# Patient Record
Sex: MALE | Race: WHITE | NOT HISPANIC OR LATINO | Employment: OTHER | URBAN - METROPOLITAN AREA
[De-identification: names, ages, dates, MRNs, and addresses within clinical notes are randomized per-mention and may not be internally consistent; named-entity substitution may affect disease eponyms.]

---

## 2017-01-20 ENCOUNTER — ALLIED HEALTH/NURSE VISIT (OUTPATIENT)
Dept: NURSING | Facility: CLINIC | Age: 47
End: 2017-01-20
Payer: COMMERCIAL

## 2017-01-20 DIAGNOSIS — Z23 NEED FOR PROPHYLACTIC VACCINATION AND INOCULATION AGAINST INFLUENZA: Primary | ICD-10-CM

## 2017-01-20 PROCEDURE — 90686 IIV4 VACC NO PRSV 0.5 ML IM: CPT

## 2017-01-20 PROCEDURE — 90471 IMMUNIZATION ADMIN: CPT

## 2017-01-20 PROCEDURE — 99207 ZZC NO CHARGE NURSE ONLY: CPT

## 2017-01-20 NOTE — PROGRESS NOTES
Injectable Influenza Immunization Documentation    1.  Is the person to be vaccinated sick today?  No    2. Does the person to be vaccinated have an allergy to eggs or to a component of the vaccine?  No    3. Has the person to be vaccinated today ever had a serious reaction to influenza vaccine in the past?  No    4. Has the person to be vaccinated ever had Guillain-Hamtramck syndrome?  No     Form completed by Isabela Barcenas, Certified Medical Assistant (AAMA)

## 2017-02-01 DIAGNOSIS — F34.1 DYSTHYMIC DISORDER: Primary | ICD-10-CM

## 2017-02-01 NOTE — TELEPHONE ENCOUNTER
buPROPion (WELLBUTRIN XL) 150 MG 24 hr tablet  buPROPion (WELLBUTRIN XL) 300 MG 24 hr tablet       Last Written Prescription Date: 11-1-2016  Last Fill Quantity: 90; # refills: 0  Last Office Visit with [facility], [facility] or [facility] prescribing provider:  5-        Last PHQ-9 score on record=   PHQ-9 SCORE 2/18/2016   Total Score -   Total Score 3       AST       23   5/19/2014  ALT       55   5/19/2014

## 2017-02-03 RX ORDER — BUPROPION HYDROCHLORIDE 300 MG/1
300 TABLET ORAL EVERY MORNING
Qty: 30 TABLET | Refills: 0 | Status: SHIPPED | OUTPATIENT
Start: 2017-02-03 | End: 2017-05-22

## 2017-02-03 RX ORDER — BUPROPION HYDROCHLORIDE 150 MG/1
150 TABLET ORAL EVERY MORNING
Qty: 30 TABLET | Refills: 0 | Status: SHIPPED | OUTPATIENT
Start: 2017-02-03 | End: 2017-05-22

## 2017-02-03 NOTE — TELEPHONE ENCOUNTER
Medication is being filled for 1 time refill only due to:  Patient needs to be seen because it has been more than one year since last visit.    Yuly Perez RN   February 3, 2017 9:42 AM  [facility] Triage   [phone]

## 2017-05-22 ENCOUNTER — OFFICE VISIT (OUTPATIENT)
Dept: FAMILY MEDICINE | Facility: CLINIC | Age: 47
End: 2017-05-22
Payer: COMMERCIAL

## 2017-05-22 VITALS
DIASTOLIC BLOOD PRESSURE: 80 MMHG | HEART RATE: 88 BPM | WEIGHT: 199 LBS | BODY MASS INDEX: 28.49 KG/M2 | SYSTOLIC BLOOD PRESSURE: 122 MMHG | TEMPERATURE: 98.6 F | HEIGHT: 70 IN

## 2017-05-22 DIAGNOSIS — F34.1 DYSTHYMIC DISORDER: ICD-10-CM

## 2017-05-22 DIAGNOSIS — R21 RASH AND NONSPECIFIC SKIN ERUPTION: Primary | ICD-10-CM

## 2017-05-22 PROCEDURE — 99214 OFFICE O/P EST MOD 30 MIN: CPT | Performed by: FAMILY MEDICINE

## 2017-05-22 RX ORDER — BUPROPION HYDROCHLORIDE 150 MG/1
150 TABLET ORAL EVERY MORNING
Qty: 90 TABLET | Refills: 3 | Status: SHIPPED | OUTPATIENT
Start: 2017-05-22 | End: 2017-08-17

## 2017-05-22 RX ORDER — BUPROPION HYDROCHLORIDE 300 MG/1
300 TABLET ORAL EVERY MORNING
Qty: 90 TABLET | Refills: 3 | Status: SHIPPED | OUTPATIENT
Start: 2017-05-22 | End: 2017-08-17

## 2017-05-22 RX ORDER — TRIAMCINOLONE ACETONIDE 1 MG/G
CREAM TOPICAL 2 TIMES DAILY PRN
Qty: 45 G | Refills: 1 | Status: SHIPPED | OUTPATIENT
Start: 2017-05-22 | End: 2019-06-07

## 2017-05-22 NOTE — PROGRESS NOTES
SUBJECTIVE:                                                    Yvon Dunlap is a 46 year old male who presents to clinic today for the following health issues:      Depression Followup    Status since last visit: Worsened a little     See PHQ-9 for current symptoms.  Other associated symptoms: None    Complicating factors:   Significant life event:  Yes- searching for jobs   Current substance abuse:  None  Anxiety or Panic symptoms:  Yes-  anxiety    PHQ-9  English PHQ-9   Any Language            Amount of exercise or physical activity: None    Problems taking medications regularly: Yes,  problems remembering to take    Medication side effects: none    Diet: regular (no restrictions)    Patient reports he has problems remembering to take the wellbutrin at times and when that is the case he tends to feel more down.  Has forgotten some lately but has about 7 days left.  Has been on other meds in the past and not really sure how well they helped.  Takes 450 mg daily of the wellbutrin.  No other concerns noted today regarding mood.    Rash     Onset: about 1 year    Description:   Location:   Right leg  Character: round, red  Itching (Pruritis): YES    Progression of Symptoms:  same    Accompanying Signs & Symptoms:  Fever: no   Body aches or joint pain: no   Sore throat symptoms: no   Recent cold symptoms: no    History:   Previous similar rash: YES- ongoing for a year     Precipitating factors:   Exposure to similar rash: no   New exposures: None   Recent travel: no     Alleviating factors:       Therapies Tried and outcome: Kenalog cream helped with itching in the past    Has not used any treatment on the rash for about 6 months.  Size is stable.        Problem list and histories reviewed & adjusted, as indicated.  Additional history: as documented    Patient Active Problem List   Diagnosis     Allergic rhinitis     HYPERLIPIDEMIA LDL GOAL <160     Erectile dysfunction     Family history of colon cancer      Family history of prostate cancer     Dysthymic disorder     Epistaxis     Past Surgical History:   Procedure Laterality Date     NO HISTORY OF SURGERY         Social History   Substance Use Topics     Smoking status: Never Smoker     Smokeless tobacco: Never Used     Alcohol use Yes      Comment: moderate, 2 beers per day     Family History   Problem Relation Age of Onset     Cancer - colorectal Father      Prostate Cancer Father      HEART DISEASE Father      heart attack     DIABETES No family hx of      C.A.D. No family hx of      Hypertension No family hx of      CEREBROVASCULAR DISEASE No family hx of          Current Outpatient Prescriptions   Medication Sig Dispense Refill     buPROPion (WELLBUTRIN XL) 300 MG 24 hr tablet Take 1 tablet (300 mg) by mouth every morning Due for a visit for refills! 90 tablet 3     buPROPion (WELLBUTRIN XL) 150 MG 24 hr tablet Take 1 tablet (150 mg) by mouth every morning Due for a visit for refills! 90 tablet 3     triamcinolone (KENALOG) 0.1 % cream Apply topically 2 times daily as needed for irritation Apply sparingly to affected area three times daily as needed 45 g 1     Pseudoeph-Doxylamine-DM-APAP (NYQUIL PO)        Pyridoxine HCl (VITAMIN B6 PO) Take by mouth daily       sildenafil (VIAGRA) 100 MG tablet Take 1 tablet by mouth daily as needed for erectile dysfunction. Take 30 min to 4 hours before intercourse.  Never use with nitroglycerin, terazosin or doxazosin. 12 tablet 11     Multiple Vitamin (DAILY MULTIVITAMIN PO) Take  by mouth daily.       Omega-3 Fatty Acids (FISH OIL PO) Take  by mouth daily.       UNABLE TO FIND MEDICATION NAME: Zinc       TYLENOL 325 MG OR TABS ONE TO TWO TABLETS EVERY 4 TO 6 HOURS AS NEEDED FOR PAIN 100 0     CLARITIN 10 MG OR TABS 1 TABLET DAILY prn for allergies       IBUPROFEN 600 MG OR TABS 1 tab prn for pain 0 0     [DISCONTINUED] buPROPion (WELLBUTRIN XL) 150 MG 24 hr tablet Take 1 tablet (150 mg) by mouth every morning Due for a  "visit for refills! 30 tablet 0     [DISCONTINUED] buPROPion (WELLBUTRIN XL) 300 MG 24 hr tablet Take 1 tablet (300 mg) by mouth every morning Due for a visit for refills! 30 tablet 0     No Known Allergies    Reviewed and updated as needed this visit by clinical staff  Tobacco  Allergies  Meds  Problems  Med Hx  Surg Hx  Fam Hx  Soc Hx        Reviewed and updated as needed this visit by Provider  Tobacco  Allergies  Meds  Problems  Med Hx  Surg Hx  Fam Hx  Soc Hx          ROS:  Constitutional, HEENT, cardiovascular, pulmonary, gi and gu systems are negative, except as otherwise noted.    OBJECTIVE:                                                    /80  Pulse 88  Temp 98.6  F (37  C) (Oral)  Ht 5' 9.5\" (1.765 m)  Wt 199 lb (90.3 kg)  BMI 28.97 kg/m2  Body mass index is 28.97 kg/(m^2).  GENERAL: healthy, alert and no distress  EYES: Eyes grossly normal to inspection, PERRL and conjunctivae and sclerae normal  MS: no gross musculoskeletal defects noted, no edema  SKIN: no suspicious lesions or rashes and on the right lower leg medially is a well defined red macular rash, oval in shape about 5 x 10 cm in size.  No blistering is noted.  Uniformly red with no central clearing.  Some dryness to skin and capillary dilation noted with mild blanching to the redness  NEURO: Normal strength and tone, mentation intact and speech normal  PSYCH: mentation appears normal, affect normal/bright    Diagnostic Test Results:  none      ASSESSMENT/PLAN:                                                            1. Dysthymic disorder  Refilled today.  Score does not suggest remission.  Discussed the idea of follow up when some of the current outside stressor improve if he is not feeling better on the medication, to consider and discuss augmentation therapies.  - buPROPion (WELLBUTRIN XL) 300 MG 24 hr tablet; Take 1 tablet (300 mg) by mouth every morning Due for a visit for refills!  Dispense: 90 tablet; Refill: " 3  - buPROPion (WELLBUTRIN XL) 150 MG 24 hr tablet; Take 1 tablet (150 mg) by mouth every morning Due for a visit for refills!  Dispense: 90 tablet; Refill: 3    2. Rash and nonspecific skin eruption  Refilled the tmc cream today.  Cause not clear to me.  Will refer to derm in a non urgent manner.  - triamcinolone (KENALOG) 0.1 % cream; Apply topically 2 times daily as needed for irritation Apply sparingly to affected area three times daily as needed  Dispense: 45 g; Refill: 1  - DERMATOLOGY REFERRAL    See Patient Instructions    Km Bahena MD  [facility]

## 2017-05-22 NOTE — PROGRESS NOTES
"  SUBJECTIVE:                                                    Yvon Dunlap is a 46 year old male who presents to clinic today for the following health issues:      Depression Followup    Status since last visit: {STABLE/WORSENED/IMPROVED:555699::\"Stable ***\"}    See PHQ-9 for current symptoms.  Other associated symptoms: {NONE DEFAULTED:399294::\"None\"}    Complicating factors:   Significant life event:  {NO/YES :580064::\"No\"}   Current substance abuse:  {Substance Use:934605::\"None\"}  Anxiety or Panic symptoms:  {NO/YES :872060::\"No\"}    PHQ-9  English PHQ-9   Any Language            Amount of exercise or physical activity: {Exercise frequency days per week:890322}    Problems taking medications regularly: {Med Problems:991234::\"No\"}    Medication side effects: {CHRONIC MED SIDE EFFECTS:112644::\"none\"}    Diet: { :107372}      {additional problems for provider to add:968089}    Problem list and histories reviewed & adjusted, as indicated.  Additional history: {NONE - AS DOCUMENTED:799574::\"as documented\"}    {HIST REVIEW/ LINKS 2:852961}    Reviewed and updated as needed this visit by clinical staff       Reviewed and updated as needed this visit by Provider         {PROVIDER CHARTING PREFERENCE:526431}    "

## 2017-05-22 NOTE — MR AVS SNAPSHOT
After Visit Summary   5/22/2017    Yvon Dunlap    MRN: [id number]           Patient Information     Date Of Birth          1970        Visit Information        Provider Department      5/22/2017 1:40 PM Km Bahena MD [facility]        Today's Diagnoses     Rash and nonspecific skin eruption    -  1    Dysthymic disorder          Care Instructions    [facility]   Discharged by : ryan  Paper scripts provided to patient : none     If you have any questions regarding your visit please contact your care team:     [facility] Clinic Hours Telephone Number   MIHAELA Friedman Dr., Dr., Dr.   7am-7pm Monday - Thursday   7am-5pm Fridays  [phone]   (Appointment scheduling available 24/7)   RN Line   [phone] option 2       For a Price Quote for your services, please call our [employer] Price Line at [phone].     What options do I have for visits at the clinic other than the traditional office visit?     To expand how we care for you, many of our providers are utilizing electronic visits (e-visits) and telephone visits, when medically appropriate, for interactions with their patients rather than a visit in the clinic. We also offer nurse visits for many medical concerns. Just like any other service, we will bill your insurance company for this type of visit based on time spent on the phone with your provider. Not all insurance companies cover these visits. Please check with your medical insurance if this type of visit is covered. You will be responsible for any charges that are not paid by your insurance.   E-visits via [employer]: generally incur a $35.00 fee.     Telephone visits:   Time spent on the phone: *charged based on time that is spent on the phone in increments of 10 minutes. Estimated cost:   5-10 mins $30.00   11-20 mins. $59.00   21-30 mins. $85.00      Use AIRVEND (secure email communication and access to your chart) to send your primary care provider a message or make an appointment. Ask someone on your Team how to sign up for AIRVEND.     As always, Thank you for trusting us with your health care needs!      [facility]:    Scheduling Appointments  [facility]anna Forbes [facility]  Call: [phone]    [facility]  Call: [phone]    [facility]  Call: [phone]      WHERE TO GO FOR CARE?    Clinic    Make an appointment if you:       Are sick (cold, cough, flu, sore throat, earache or in pain).       Have a small injury (sprain, small cut, burn or broken bone).       Need a physical exam, Pap smear, vaccine or prescription refill.       Have questions about your health or medicines.    To reach us:      Call [phone] ([phone]). Open 24 hours every day. (For counseling services, call [phone].)    Log into AIRVEND at [email or web address]. (Visit [email or web address] to create an account.) Hospital emergency room    An emergency is a serious or life- threatening problem that must be treated right away.    Call 762 or get to the hospital if you have:      Very bad or sudden:            - Chest pain or pressure         - Bleeding         - Head or belly pain         - Dizziness or trouble seeing, walking or                          Speaking      Problems breathing      Blood in your vomit or you are coughing up blood      A major injury (knocked out, loss of a finger or limb, rape, broken bone protruding from skin)    A mental health crisis. (Or call the Mental Health Crisis line at 1-337.247.5137 or Suicide Prevention Hotline at 1-639.635.8123.)    Open 24 hours every day. You don't need an appointment.     Urgent care    Visit urgent care for sickness or small injuries when the clinic is closed. You don't need an appointment. To check hours or find an urgent care  near you, visit [email or web address]. Online care    Get online care from [facility] for more than 70 common problems, like colds, allergies and infections. Open 24 hours every day at:   [email or web address]   Need help deciding?    For advice about where to be seen, you may call your clinic and ask to speak with a nurse. We're here for you 24 hours every day.         If you are deaf or hard of hearing, please let us know. We provide many free services including sign language interpreters, oral interpreters, TTYs, telephone amplifiers, note takers and written materials.                       Follow-ups after your visit        Additional Services     DERMATOLOGY REFERRAL       Your provider has referred you to: [facility]: Dermatology Clinic [facility] [phone]   [email or web address]    Please be aware that coverage of these services is subject to the terms and limitations of your health insurance plan.  Call member services at your health plan with any benefit or coverage questions.      Please bring the following with you to your appointment:    (1) Any X-Rays, CTs or MRIs which have been performed.  Contact the facility where they were done to arrange for  prior to your scheduled appointment.    (2) List of current medications  (3) This referral request   (4) Any documents/labs given to you for this referral                  Who to contact     If you have questions or need follow up information about today's clinic visit or your schedule please contact [employer] directly at [phone].  Normal or non-critical lab and imaging results will be communicated to you by MyChart, letter or phone within 4 business days after the clinic has received the results. If you do not hear from us within 7 days, please contact the clinic through MyChart or phone. If you have a critical or abnormal lab result, we will notify you by phone as soon as possible.  Submit refill requests  "through [employer] or call your pharmacy and they will forward the refill request to us. Please allow 3 business days for your refill to be completed.          Additional Information About Your Visit        [facility] Information     Tamecco gives you secure access to your electronic health record. If you see a primary care provider, you can also send messages to your care team and make appointments. If you have questions, please call your primary care clinic.  If you do not have a primary care provider, please call [phone] and they will assist you.        Care EveryWhere ID     This is your Care EveryWhere ID. This could be used by other organizations to access your [facility] medical records  [id number]        Your Vitals Were     Pulse Temperature Height BMI (Body Mass Index)          88 98.6  F (37  C) (Oral) 5' 9.5\" (1.765 m) 28.97 kg/m2         Blood Pressure from Last 3 Encounters:   05/22/17 122/80   05/26/16 124/72   02/18/16 114/70    Weight from Last 3 Encounters:   05/22/17 199 lb (90.3 kg)   05/26/16 196 lb (88.9 kg)   02/18/16 198 lb (89.8 kg)              We Performed the Following     DERMATOLOGY REFERRAL          Today's Medication Changes          These changes are accurate as of: 5/22/17  2:23 PM.  If you have any questions, ask your nurse or doctor.               These medicines have changed or have updated prescriptions.        Dose/Directions    triamcinolone 0.1 % cream   Commonly known as:  KENALOG   This may have changed:    - how to take this  - when to take this  - reasons to take this  - additional instructions   Used for:  Rash and nonspecific skin eruption   Changed by:  Km Bahena MD        Apply topically 2 times daily as needed for irritation Apply sparingly to affected area three times daily as needed   Quantity:  45 g   Refills:  1            Where to get your medicines      These medications were sent to [facility] [address] [place], [place] - [facility] [facility] "  [address] [address]     Phone:  [phone]     buPROPion 150 MG 24 hr tablet    buPROPion 300 MG 24 hr tablet    triamcinolone 0.1 % cream                Primary Care Provider Office Phone # Fax #    Lio Huizar PA-C [phone]       [facility] [address] [address]        Thank you!     Thank you for choosing [facility]  for your care. Our goal is always to provide you with excellent care. Hearing back from our patients is one way we can continue to improve our services. Please take a few minutes to complete the written survey that you may receive in the mail after your visit with us. Thank you!             Your Updated Medication List - Protect others around you: Learn how to safely use, store and throw away your medicines at www.disposemymeds.org.          This list is accurate as of: 5/22/17  2:23 PM.  Always use your most recent med list.                   Brand Name Dispense Instructions for use    * buPROPion 300 MG 24 hr tablet    WELLBUTRIN XL    90 tablet    Take 1 tablet (300 mg) by mouth every morning Due for a visit for refills!       * buPROPion 150 MG 24 hr tablet    WELLBUTRIN XL    90 tablet    Take 1 tablet (150 mg) by mouth every morning Due for a visit for refills!       CLARITIN 10 MG tablet   Generic drug:  loratadine      1 TABLET DAILY prn for allergies       DAILY MULTIVITAMIN PO      Take  by mouth daily.       FISH OIL PO      Take  by mouth daily.       ibuprofen 600 MG tablet    ADVIL/MOTRIN    0    1 tab prn for pain       NYQUIL PO          sildenafil 100 MG cap/tab    REVATIO/VIAGRA    12 tablet    Take 1 tablet by mouth daily as needed for erectile dysfunction. Take 30 min to 4 hours before intercourse.  Never use with nitroglycerin, terazosin or doxazosin.       triamcinolone 0.1 % cream    KENALOG    45 g    Apply topically 2 times daily as needed for irritation Apply  sparingly to affected area three times daily as needed       TYLENOL 325 MG tablet   Generic drug:  acetaminophen     100    ONE TO TWO TABLETS EVERY 4 TO 6 HOURS AS NEEDED FOR PAIN       UNABLE TO FIND      MEDICATION NAME: Zinc       VITAMIN B6 PO      Take by mouth daily       * Notice:  This list has 2 medication(s) that are the same as other medications prescribed for you. Read the directions carefully, and ask your doctor or other care provider to review them with you.

## 2017-05-22 NOTE — PATIENT INSTRUCTIONS
[facility]   Discharged by : ryan  Paper scripts provided to patient : none     If you have any questions regarding your visit please contact your care team:     [facility] Clinic Hours Telephone Number   MIHAELA Friedman Dr., Dr., Dr.   7am-7pm Monday - Thursday   7am-5pm Fridays  [phone]   (Appointment scheduling available 24/7)   RN Line   [phone] option 2       For a Price Quote for your services, please call our [employer] Price Line at [phone].     What options do I have for visits at the clinic other than the traditional office visit?     To expand how we care for you, many of our providers are utilizing electronic visits (e-visits) and telephone visits, when medically appropriate, for interactions with their patients rather than a visit in the clinic. We also offer nurse visits for many medical concerns. Just like any other service, we will bill your insurance company for this type of visit based on time spent on the phone with your provider. Not all insurance companies cover these visits. Please check with your medical insurance if this type of visit is covered. You will be responsible for any charges that are not paid by your insurance.   E-visits via [employer]: generally incur a $35.00 fee.     Telephone visits:   Time spent on the phone: *charged based on time that is spent on the phone in increments of 10 minutes. Estimated cost:   5-10 mins $30.00   11-20 mins. $59.00   21-30 mins. $85.00     Use [employer] (secure email communication and access to your chart) to send your primary care provider a message or make an appointment. Ask someone on your Team how to sign up for [employer].     As always, Thank you for trusting us with your health care needs!      [facility]:    Scheduling Appointments  [facility]oh[facility] Forbes [facility]  Call: [phone]    [facility]  Call: [phone]    [facility]  Call: [phone]      WHERE TO GO FOR CARE?    Clinic    Make an appointment if you:       Are sick (cold, cough, flu, sore throat, earache or in pain).       Have a small injury (sprain, small cut, burn or broken bone).       Need a physical exam, Pap smear, vaccine or prescription refill.       Have questions about your health or medicines.    To reach us:      Call [phone] ([phone]). Open 24 hours every day. (For counseling services, call [phone].)    Log into D[employer]NA at [employer]. (Visit [employer] to create an account.) Hospital emergency room    An emergency is a serious or life- threatening problem that must be treated right away.    Call 118 or get to the hospital if you have:      Very bad or sudden:            - Chest pain or pressure         - Bleeding         - Head or belly pain         - Dizziness or trouble seeing, walking or                          Speaking      Problems breathing      Blood in your vomit or you are coughing up blood      A major injury (knocked out, loss of a finger or limb, rape, broken bone protruding from skin)    A mental health crisis. (Or call the Mental Health Crisis line at 1-232.779.6576 or Suicide Prevention Hotline at 1-420.920.7532.)    Open 24 hours every day. You don't need an appointment.     Urgent care    Visit urgent care for sickness or small injuries when the clinic is closed. You don't need an appointment. To check hours or find an urgent care near you, visit [email or web address]. Online care    Get online care from [facility] for more than 70 common problems, like colds, allergies and infections. Open 24 hours every day at:   [email or web address]   Need help deciding?    For advice about where to be seen, you may call your clinic and ask to speak with a nurse. We're here for you 24 hours every day.         If you are deaf or hard of hearing, please let us  know. We provide many free services including sign language interpreters, oral interpreters, TTYs, telephone amplifiers, note takers and written materials.

## 2017-05-22 NOTE — NURSING NOTE
"Chief Complaint   Patient presents with     Depression     follow-up     Derm Problem     rash on right leg for the past year       Initial /80  Pulse 88  Temp 98.6  F (37  C) (Oral)  Ht 5' 9.5\" (1.765 m)  Wt 199 lb (90.3 kg)  BMI 28.97 kg/m2 Estimated body mass index is 28.97 kg/(m^2) as calculated from the following:    Height as of this encounter: 5' 9.5\" (1.765 m).    Weight as of this encounter: 199 lb (90.3 kg).  Medication Reconciliation: complete   Elida Villagomez CMA (AAMA)      "

## 2017-05-23 ASSESSMENT — PATIENT HEALTH QUESTIONNAIRE - PHQ9: SUM OF ALL RESPONSES TO PHQ QUESTIONS 1-9: 10

## 2017-08-17 ENCOUNTER — OFFICE VISIT (OUTPATIENT)
Dept: FAMILY MEDICINE | Facility: CLINIC | Age: 47
End: 2017-08-17
Payer: COMMERCIAL

## 2017-08-17 VITALS
DIASTOLIC BLOOD PRESSURE: 78 MMHG | BODY MASS INDEX: 28.63 KG/M2 | WEIGHT: 200 LBS | SYSTOLIC BLOOD PRESSURE: 118 MMHG | HEART RATE: 84 BPM | TEMPERATURE: 98.5 F | HEIGHT: 70 IN

## 2017-08-17 DIAGNOSIS — F34.1 DYSTHYMIC DISORDER: ICD-10-CM

## 2017-08-17 DIAGNOSIS — N52.9 ERECTILE DYSFUNCTION, UNSPECIFIED ERECTILE DYSFUNCTION TYPE: Primary | ICD-10-CM

## 2017-08-17 PROCEDURE — 99213 OFFICE O/P EST LOW 20 MIN: CPT | Performed by: PHYSICIAN ASSISTANT

## 2017-08-17 RX ORDER — BUPROPION HYDROCHLORIDE 300 MG/1
300 TABLET ORAL EVERY MORNING
Qty: 90 TABLET | Refills: 3 | Status: SHIPPED | OUTPATIENT
Start: 2017-08-17 | End: 2018-12-11

## 2017-08-17 RX ORDER — SILDENAFIL 100 MG/1
100 TABLET, FILM COATED ORAL DAILY PRN
Qty: 12 TABLET | Refills: 11 | Status: SHIPPED | OUTPATIENT
Start: 2017-08-17 | End: 2018-06-15

## 2017-08-17 RX ORDER — BUPROPION HYDROCHLORIDE 150 MG/1
150 TABLET ORAL EVERY MORNING
Qty: 90 TABLET | Refills: 3 | Status: SHIPPED | OUTPATIENT
Start: 2017-08-17 | End: 2018-12-11

## 2017-08-17 ASSESSMENT — PATIENT HEALTH QUESTIONNAIRE - PHQ9: SUM OF ALL RESPONSES TO PHQ QUESTIONS 1-9: 5

## 2017-08-17 NOTE — NURSING NOTE
"Chief Complaint   Patient presents with     Depression     Refill Request       Initial /78 (BP Location: Right arm, Cuff Size: Adult Large)  Pulse 84  Temp 98.5  F (36.9  C) (Oral)  Ht 5' 9.5\" (1.765 m)  Wt 200 lb (90.7 kg)  BMI 29.11 kg/m2 Estimated body mass index is 29.11 kg/(m^2) as calculated from the following:    Height as of this encounter: 5' 9.5\" (1.765 m).    Weight as of this encounter: 200 lb (90.7 kg).  Medication Reconciliation: complete     Debbie Merrill CMA (AAMA)      "

## 2017-08-17 NOTE — PROGRESS NOTES
"  SUBJECTIVE:   Yvon Dunlap is a 46 year old male who presents to clinic today for the following health issues:    Depression Followup    Status since last visit: Stable     See PHQ-9 for current symptoms.  Other associated symptoms: None    Complicating factors:   Significant life event:  No   Current substance abuse:  None  Anxiety or Panic symptoms:  No  Mood is okay - no concerns, feels like he's fine, but is struggling with trying to find employment right now. 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    Viagra - using intermittently for erectile dysfunction. Notes that this is working well without issues.     PHQ-9  English  PHQ-9   Any Language      Amount of exercise or physical activity: None    Problems taking medications regularly: No    Medication side effects: none  Diet: regular (no restrictions)      Problem list and histories reviewed & adjusted, as indicated.  Additional history: as documented    Labs reviewed in EPIC    Reviewed and updated as needed this visit by clinical staff     Reviewed and updated as needed this visit by Provider         ROS:  Constitutional, HEENT, cardiovascular, pulmonary, gi and gu systems are negative, except as otherwise noted.      OBJECTIVE:   /78 (BP Location: Right arm, Cuff Size: Adult Large)  Pulse 84  Temp 98.5  F (36.9  C) (Oral)  Ht 5' 9.5\" (1.765 m)  Wt 200 lb (90.7 kg)  BMI 29.11 kg/m2  Body mass index is 29.11 kg/(m^2).  GENERAL: healthy, alert and no distress  Psych: Appropriate appearance.  Alert and oriented times 3; coherent speech, normal   rate and volume, able to articulate logical thoughts, able   to abstract reason, no tangential thoughts, no hallucinations   or delusions.  Normal behavior.  His affect is bright.        ASSESSMENT/PLAN:     (N52.9) Erectile dysfunction, unspecified erectile dysfunction type  (primary encounter diagnosis)  Comment: Stable, doing well - no other concerning vascular " signs   Plan: sildenafil (REVATIO/VIAGRA) 100 MG cap/tab        Refills. This prescription is given with a discussion of side effects, risks and proper use.  Instructions are given to follow up if not improving or symptoms change or worsen as discussed.     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 Self cares etc reviewed.       PIERRE HARDWICK PA-C  [facility]

## 2017-08-17 NOTE — MR AVS SNAPSHOT
"              After Visit Summary   8/17/2017    Yvon Dunlap    MRN: [id number]           Patient Information     Date Of Birth          1970        Visit Information        Provider Department      8/17/2017 2:20 PM Lio Huizar PA-C [facility]        Today's Diagnoses     Erectile dysfunction, unspecified erectile dysfunction type    -  1    Dysthymic disorder           Follow-ups after your visit        Who to contact     If you have questions or need follow up information about today's clinic visit or your schedule please contact [facility] directly at [phone].  Normal or non-critical lab and imaging results will be communicated to you by [employer], letter or phone within 4 business days after the clinic has received the results. If you do not hear from us within 7 days, please contact the clinic through [employer] or phone. If you have a critical or abnormal lab result, we will notify you by phone as soon as possible.  Submit refill requests through [facility] [employer] or call your pharmacy and they will forward the refill request to us. Please allow 3 business days for your refill to be completed.          Additional Information About Your Visit        MyChart Information     [employer] gives you secure access to your electronic health record. If you see a primary care provider, you can also send messages to your care team and make appointments. If you have questions, please call your primary care clinic.  If you do not have a primary care provider, please call [phone] and they will assist you.        Care EveryWhere ID     This is your Care EveryWhere ID. This could be used by other organizations to access your [facility] medical records  [id number]        Your Vitals Were     Pulse Temperature Height BMI (Body Mass Index)          84 98.5  F (36.9  C) (Oral) 5' 9.5\" (1.765 m) 29.11 kg/m2         Blood Pressure from Last 3 Encounters:   08/17/17 118/78 "   05/22/17 122/80   05/26/16 124/72    Weight from Last 3 Encounters:   08/17/17 200 lb (90.7 kg)   05/22/17 199 lb (90.3 kg)   05/26/16 196 lb (88.9 kg)              We Performed the Following     DEPRESSION ACTION PLAN (DAP)          Today's Medication Changes          These changes are accurate as of: 8/17/17  3:04 PM.  If you have any questions, ask your nurse or doctor.               These medicines have changed or have updated prescriptions.        Dose/Directions    * buPROPion 150 MG 24 hr tablet   Commonly known as:  WELLBUTRIN XL   This may have changed:  additional instructions   Used for:  Dysthymic disorder   Changed by:  Lio Huizar PA-C        Dose:  150 mg   Take 1 tablet (150 mg) by mouth every morning   Quantity:  90 tablet   Refills:  3       * buPROPion 300 MG 24 hr tablet   Commonly known as:  WELLBUTRIN XL   This may have changed:  additional instructions   Used for:  Dysthymic disorder   Changed by:  Lio Huizar PA-C        Dose:  300 mg   Take 1 tablet (300 mg) by mouth every morning   Quantity:  90 tablet   Refills:  3       * Notice:  This list has 2 medication(s) that are the same as other medications prescribed for you. Read the directions carefully, and ask your doctor or other care provider to review them with you.         Where to get your medicines      These medications were sent to [facility] [id number] [place] [facility] [place], [place] - [address]  [address] [facility] [address] [address]     Phone:  [phone]     buPROPion 150 MG 24 hr tablet    buPROPion 300 MG 24 hr tablet         Some of these will need a paper prescription and others can be bought over the counter.  Ask your nurse if you have questions.     Bring a paper prescription for each of these medications     sildenafil 100 MG cap/tab                Primary Care Provider Office Phone # Fax #    Lio Huizar PA-C [phone]       [facility] [facility] [address]         Equal Access to Services     [facility]: Hadii aad ku hadmanojefra Benjaminali, wasergioda luqadaha, qaybta kaalmamichelle lr, nella burleson. So [facility] [phone].    ATENCIÓN: Si habla hay, tiene a hand disposición servicios gratuitos de asistencia lingüística. Opalame al [phone].    We comply with applicable federal civil rights laws and [place] laws. We do not discriminate on the basis of race, color, national origin, age, disability sex, sexual orientation or gender identity.            Thank you!     Thank you for choosing [facility]  for your care. Our goal is always to provide you with excellent care. Hearing back from our patients is one way we can continue to improve our services. Please take a few minutes to complete the written survey that you may receive in the mail after your visit with us. Thank you!             Your Updated Medication List - Protect others around you: Learn how to safely use, store and throw away your medicines at www.disposemymeds.org.          This list is accurate as of: 8/17/17  3:04 PM.  Always use your most recent med list.                   Brand Name Dispense Instructions for use Diagnosis    * buPROPion 150 MG 24 hr tablet    WELLBUTRIN XL    90 tablet    Take 1 tablet (150 mg) by mouth every morning    Dysthymic disorder       * buPROPion 300 MG 24 hr tablet    WELLBUTRIN XL    90 tablet    Take 1 tablet (300 mg) by mouth every morning    Dysthymic disorder       CLARITIN 10 MG tablet   Generic drug:  loratadine      1 TABLET DAILY prn for allergies        DAILY MULTIVITAMIN PO      Take  by mouth daily.        FISH OIL PO      Take  by mouth daily.        ibuprofen 600 MG tablet    ADVIL/MOTRIN    0    1 tab prn for pain        NYQUIL PO           sildenafil 100 MG cap/tab    REVATIO/VIAGRA    12 tablet    Take 1 tablet (100 mg) by mouth daily as needed for erectile dysfunction Take 30 min to 4 hours before intercourse.  Never  use with nitroglycerin, terazosin or doxazosin.    Erectile dysfunction, unspecified erectile dysfunction type       triamcinolone 0.1 % cream    KENALOG    45 g    Apply topically 2 times daily as needed for irritation Apply sparingly to affected area three times daily as needed    Rash and nonspecific skin eruption       TYLENOL 325 MG tablet   Generic drug:  acetaminophen     100    ONE TO TWO TABLETS EVERY 4 TO 6 HOURS AS NEEDED FOR PAIN        UNABLE TO FIND      MEDICATION NAME: Zinc        VITAMIN B6 PO      Take by mouth daily        * Notice:  This list has 2 medication(s) that are the same as other medications prescribed for you. Read the directions carefully, and ask your doctor or other care provider to review them with you.

## 2017-08-17 NOTE — NURSING NOTE
Handed patient 1 printed out prescription for:    sildenafil (REVATIO/VIAGRA) 100 MG cap/tab    Sierra Mccloud, CMA

## 2017-08-22 ENCOUNTER — TELEPHONE (OUTPATIENT)
Dept: FAMILY MEDICINE | Facility: CLINIC | Age: 47
End: 2017-08-22

## 2017-08-22 NOTE — TELEPHONE ENCOUNTER
Reason for Call:  Other prescription    Detailed comments: Patient stated that his pharmacy called and informed him that he needed a prior authorization for him medication. Pharmacy told him that they would send clinic a fax, and that he needed to call to see what his next step is. Please call patient to advise of what his next step would be.    Phone Number Patient can be reached at: Cell number on file:    Telephone Information:   Mobile [phone]       Best Time: anytime    Can we leave a detailed message on this number? YES    Call taken on 8/22/2017 at 5:20 PM by Ruperto Kelley

## 2017-08-23 ENCOUNTER — TELEPHONE (OUTPATIENT)
Dept: FAMILY MEDICINE | Facility: CLINIC | Age: 47
End: 2017-08-23

## 2017-08-23 NOTE — TELEPHONE ENCOUNTER
Per pharmacy, the patient's insurance requires a prior authorization for one or more of the patient's medications.  Please initiate prior authorization or call/fax pharmacy to change patient's medication.    Medication: sildenafil  Strength: 20 mg  Sig: take one tablet by mouth daily as needed. Take 30 minutes to 4 hours prior to intercourse.    Pharmacy: [facility] pharmacy  Phone: [phone]  Fax: [phone]    Prescription Insurance: [facility]  Phone: [phone]  ID: [id number]    Additional Comments: Plan does not cover this medication.

## 2017-08-23 NOTE — TELEPHONE ENCOUNTER
See Telephone Encounter dated 8/22/17 (will copy below info into that encounter).    Sierra Mccloud, CMA

## 2017-08-23 NOTE — TELEPHONE ENCOUNTER
Copied over below info from duplicate encounter dated 8/23/17:    Per pharmacy, the patient's insurance requires a prior authorization for one or more of the patient's medications.  Please initiate prior authorization or call/fax pharmacy to change patient's medication.    Medication: sildenafil  Strength: 20 mg  Sig: take one tablet by mouth daily as needed. Take 30 minutes to 4 hours prior to intercourse.    Pharmacy: [facility] pharmacy  Phone: [phone]  Fax: [phone]    Prescription Insurance: [facility]  Phone: [phone]  ID: [id number]    Additional Comments: Plan does not cover this medication.

## 2017-08-24 NOTE — TELEPHONE ENCOUNTER
No need to do PA. The med will have to be cash pay - his plan doesn't cover med. Please let patient know.  Lio Huizar MPAS, PA-C

## 2017-08-24 NOTE — TELEPHONE ENCOUNTER
Left message for patient regarding medication coverage of Sildenafil.    Debbie Merrill CMA (AAMA)

## 2017-11-09 ENCOUNTER — TELEPHONE (OUTPATIENT)
Dept: FAMILY MEDICINE | Facility: CLINIC | Age: 47
End: 2017-11-09

## 2017-11-09 NOTE — TELEPHONE ENCOUNTER
Please repeat PHQ-9.  Index date: 5/22/17  Follow up start date:  10/22/17  Follow up end date:  12/22/17    Sonal Roque MA

## 2018-06-15 ENCOUNTER — OFFICE VISIT (OUTPATIENT)
Dept: FAMILY MEDICINE | Facility: CLINIC | Age: 48
End: 2018-06-15
Payer: COMMERCIAL

## 2018-06-15 VITALS
SYSTOLIC BLOOD PRESSURE: 114 MMHG | TEMPERATURE: 98.5 F | HEIGHT: 70 IN | WEIGHT: 197 LBS | HEART RATE: 88 BPM | DIASTOLIC BLOOD PRESSURE: 78 MMHG | BODY MASS INDEX: 28.2 KG/M2

## 2018-06-15 DIAGNOSIS — F34.1 DYSTHYMIC DISORDER: ICD-10-CM

## 2018-06-15 DIAGNOSIS — R79.89 LOW VITAMIN D LEVEL: ICD-10-CM

## 2018-06-15 DIAGNOSIS — Z00.00 ROUTINE GENERAL MEDICAL EXAMINATION AT A HEALTH CARE FACILITY: Primary | ICD-10-CM

## 2018-06-15 DIAGNOSIS — E78.5 HYPERLIPIDEMIA LDL GOAL <160: ICD-10-CM

## 2018-06-15 DIAGNOSIS — N52.9 ERECTILE DYSFUNCTION, UNSPECIFIED ERECTILE DYSFUNCTION TYPE: ICD-10-CM

## 2018-06-15 LAB — PSA SERPL-ACNC: 1.15 UG/L (ref 0–4)

## 2018-06-15 PROCEDURE — 36415 COLL VENOUS BLD VENIPUNCTURE: CPT | Performed by: PHYSICIAN ASSISTANT

## 2018-06-15 PROCEDURE — G0103 PSA SCREENING: HCPCS | Performed by: PHYSICIAN ASSISTANT

## 2018-06-15 PROCEDURE — 82306 VITAMIN D 25 HYDROXY: CPT | Performed by: PHYSICIAN ASSISTANT

## 2018-06-15 PROCEDURE — 99396 PREV VISIT EST AGE 40-64: CPT | Performed by: PHYSICIAN ASSISTANT

## 2018-06-15 PROCEDURE — 82947 ASSAY GLUCOSE BLOOD QUANT: CPT | Performed by: PHYSICIAN ASSISTANT

## 2018-06-15 PROCEDURE — 80061 LIPID PANEL: CPT | Performed by: PHYSICIAN ASSISTANT

## 2018-06-15 RX ORDER — SILDENAFIL 100 MG/1
100 TABLET, FILM COATED ORAL DAILY PRN
Qty: 12 TABLET | Refills: 11 | Status: SHIPPED | OUTPATIENT
Start: 2018-06-15 | End: 2018-12-08

## 2018-06-15 RX ORDER — BUPROPION HYDROCHLORIDE 150 MG/1
150 TABLET ORAL EVERY MORNING
Qty: 90 TABLET | Refills: 3 | Status: CANCELLED | OUTPATIENT
Start: 2018-06-15

## 2018-06-15 RX ORDER — BUPROPION HYDROCHLORIDE 300 MG/1
300 TABLET ORAL EVERY MORNING
Qty: 90 TABLET | Refills: 3 | Status: CANCELLED | OUTPATIENT
Start: 2018-06-15

## 2018-06-15 NOTE — PROGRESS NOTES
SUBJECTIVE:   CC: Yvon uDnlap is an 47 year old male who presents for preventative health visit.     Physical   Annual:     Getting at least 3 servings of Calcium per day::  Yes    Bi-annual eye exam::  Yes    Dental care twice a year::  Yes    Sleep apnea or symptoms of sleep apnea::  None    Diet::  Regular (no restrictions)    Frequency of exercise::  2-3 days/week    Duration of exercise::  15-30 minutes    Taking medications regularly::  Yes    Medication side effects::  Not applicable    Additional concerns today::  YES (Skin spot on right leg )            Skin spots haven't changed, dark spots back of right calf. Not painful.       Today's PHQ-2 Score:   PHQ-2 ( 1999 [employer]) 6/12/2018   Q1: Little interest or pleasure in doing things 0   Q2: Feeling down, depressed or hopeless 1   PHQ-2 Score 1   Q1: Little interest or pleasure in doing things Not at all   Q2: Feeling down, depressed or hopeless Several days   PHQ-2 Score 1       Abuse: Current or Past(Physical, Sexual or Emotional)- No  Do you feel safe in your environment - Yes    Social History   Substance Use Topics     Smoking status: Never Smoker     Smokeless tobacco: Never Used     Alcohol use Yes      Comment: moderate, 2 beers per day     Alcohol Use 6/12/2018   If you drink alcohol do you typically have greater than 3 drinks per day OR greater than 7 drinks per week? No   No flowsheet data found.    Last PSA:   PSA   Date Value Ref Range Status   02/08/2016 1.39 0 - 4 ug/L Final       Reviewed orders with patient. Reviewed health maintenance and updated orders accordingly - Yes  Labs reviewed in EPIC    Reviewed and updated as needed this visit by clinical staff  Tobacco  Allergies  Meds  Med Hx  Surg Hx  Fam Hx  Soc Hx        Reviewed and updated as needed this visit by Provider            Review of Systems  CONSTITUTIONAL: NEGATIVE for fever, chills, change in weight  INTEGUMENTARY/SKIN: NEGATIVE for worrisome rashes, moles or  "lesions  EYES: NEGATIVE for vision changes or irritation  ENT: NEGATIVE for ear, mouth and throat problems  RESP: NEGATIVE for significant cough or SOB  CV: NEGATIVE for chest pain, palpitations or peripheral edema  GI: NEGATIVE for nausea, abdominal pain, heartburn, or change in bowel habits   male: negative for dysuria, hematuria, decreased urinary stream, erectile dysfunction, urethral discharge  MUSCULOSKELETAL: NEGATIVE for significant arthralgias or myalgia  NEURO: NEGATIVE for weakness, dizziness or paresthesias  PSYCHIATRIC: NEGATIVE for changes in mood or affect    OBJECTIVE:   /78 (Cuff Size: Adult Large)  Pulse 88  Temp 98.5  F (36.9  C) (Oral)  Ht 5' 9.5\" (1.765 m)  Wt 197 lb (89.4 kg)  BMI 28.67 kg/m2    Physical Exam  GENERAL: healthy, alert and no distress  EYES: Eyes grossly normal to inspection, PERRL and conjunctivae and sclerae normal  HENT: ear canals and TM's normal, nose and mouth without ulcers or lesions  NECK: no adenopathy, no asymmetry, masses, or scars and thyroid normal to palpation  RESP: lungs clear to auscultation - no rales, rhonchi or wheezes  CV: regular rate and rhythm, normal S1 S2, no S3 or S4, no murmur, click or rub, no peripheral edema and peripheral pulses strong  ABDOMEN: soft, nontender, no hepatosplenomegaly, no masses and bowel sounds normal  MS: no gross musculoskeletal defects noted, no edema  SKIN: no suspicious lesions or rashes  NEURO: Normal strength and tone, mentation intact and speech normal  PSYCH: mentation appears normal, affect normal/bright  LYMPH: no cervical, supraclavicular, axillary, or inguinal adenopathy    ASSESSMENT/PLAN:   (Z00.00) Routine general medical examination at a health care facility  (primary encounter diagnosis)  Comment: Well person   Plan: Lipid panel reflex to direct LDL Fasting,         Glucose, Prostate spec antigen screen        Diet, exercise, wellness and other preventive recommendations related to health maintenance " "were discussed.  Follow up as needed for acute issues.  Physical exam in 1 year.     (F34.1) Dysthymic disorder  Comment:   Plan: Fairly stable, refills x 12 months - declines today, not due until August     (N52.9) Erectile dysfunction, unspecified erectile dysfunction type  Comment:   Plan: sildenafil (VIAGRA) 100 MG tablet        Refills - stable     (E55.9) Low vitamin D level  Comment:   Plan: Vitamin D Deficiency        Recheck - has prostate cancer history in his family     (E78.5) Hyperlipidemia LDL goal <160  Comment:   Plan:   Lab Results   Component Value Date     02/08/2016          COUNSELING:   Reviewed preventive health counseling, as reflected in patient instructions         reports that he has never smoked. He has never used smokeless tobacco.    Estimated body mass index is 28.67 kg/(m^2) as calculated from the following:    Height as of this encounter: 5' 9.5\" (1.765 m).    Weight as of this encounter: 197 lb (89.4 kg).       Counseling Resources:  ATP IV Guidelines  Pooled Cohorts Equation Calculator  FRAX Risk Assessment  ICSI Preventive Guidelines  Dietary Guidelines for Americans, 2010  [employer]'s MyPlate  ASA Prophylaxis  Lung CA Screening    PIERRE HARDWICK PA-C  [facility]  Answers for HPI/ROS submitted by the patient on 6/12/2018   PHQ-2 Score: 1    "

## 2018-06-15 NOTE — MR AVS SNAPSHOT
After Visit Summary   6/15/2018    Yvon Dunlap    MRN: [id number]           Patient Information     Date Of Birth          1970        Visit Information        Provider Department      6/15/2018 1:00 PM Lio Huizar PA-C [facility]        Today's Diagnoses     Routine general medical examination at a health care facility    -  1    Dysthymic disorder        Erectile dysfunction, unspecified erectile dysfunction type        Low vitamin D level        Hyperlipidemia LDL goal <160          Care Instructions      Preventive Health Recommendations  Male Ages 40 to 49    Yearly exam:             See your health care provider every year in order to  o   Review health changes.   o   Discuss preventive care.    o   Review your medicines if your doctor has prescribed any.    You should be tested each year for STDs (sexually transmitted diseases) if you re at risk.     Have a cholesterol test every 5 years.     Have a colonoscopy (test for colon cancer) if someone in your family has had colon cancer or polyps before age 50.     After age 45, have a diabetes test (fasting glucose). If you are at risk for diabetes, you should have this test every 3 years.      Talk with your health care provider about whether or not a prostate cancer screening test (PSA) is right for you.    Shots: Get a flu shot each year. Get a tetanus shot every 10 years.     Nutrition:    Eat at least 5 servings of fruits and vegetables daily.     Eat whole-grain bread, whole-wheat pasta and brown rice instead of white grains and rice.     Talk to your provider about Calcium and Vitamin D.     Lifestyle    Exercise for at least 150 minutes a week (30 minutes a day, 5 days a week). This will help you control your weight and prevent disease.     Limit alcohol to one drink per day.     No smoking.     Wear sunscreen to prevent skin cancer.     See your dentist every six months for an exam and cleaning.     "          Follow-ups after your visit        Who to contact     If you have questions or need follow up information about today's clinic visit or your schedule please contact [facility] directly at [phone].  Normal or non-critical lab and imaging results will be communicated to you by MyChart, letter or phone within 4 business days after the clinic has received the results. If you do not hear from us within 7 days, please contact the clinic through [employer] or phone. If you have a critical or abnormal lab result, we will notify you by phone as soon as possible.  Submit refill requests through [facility] or call your pharmacy and they will forward the refill request to us. Please allow 3 business days for your refill to be completed.          Additional Information About Your Visit        [employer] Information     [facility] gives you secure access to your electronic health record. If you see a primary care provider, you can also send messages to your care team and make appointments. If you have questions, please call your primary care clinic.  If you do not have a primary care provider, please call [phone] and they will assist you.        Care EveryWhere ID     This is your Care EveryWhere ID. This could be used by other organizations to access your [place] medical records  [id number]        Your Vitals Were     Pulse Temperature Height BMI (Body Mass Index)          88 98.5  F (36.9  C) (Oral) 5' 9.5\" (1.765 m) 28.67 kg/m2         Blood Pressure from Last 3 Encounters:   06/15/18 114/78   08/17/17 118/78   05/22/17 122/80    Weight from Last 3 Encounters:   06/15/18 197 lb (89.4 kg)   08/17/17 200 lb (90.7 kg)   05/22/17 199 lb (90.3 kg)              We Performed the Following     Glucose     Lipid panel reflex to direct LDL Fasting     Prostate spec antigen screen     Vitamin D Deficiency          Today's Medication Changes          These changes are accurate as of 6/15/18  1:43 PM.  If you " have any questions, ask your nurse or doctor.               These medicines have changed or have updated prescriptions.        Dose/Directions    sildenafil 100 MG tablet   Commonly known as:  VIAGRA   This may have changed:  reasons to take this   Used for:  Erectile dysfunction, unspecified erectile dysfunction type   Changed by:  Lio Huizar PA-C        Dose:  100 mg   Take 1 tablet (100 mg) by mouth daily as needed Take 30 min to 4 hours before intercourse.  Never use with nitroglycerin, terazosin or doxazosin.   Quantity:  12 tablet   Refills:  11            Where to get your medicines      Some of these will need a paper prescription and others can be bought over the counter.  Ask your nurse if you have questions.     Bring a paper prescription for each of these medications     sildenafil 100 MG tablet                Primary Care Provider Office Phone # Fax #    Lio Huizar PA-C [phone]       [address] [facility] [address]        Equal Access to Services     [facility] District[facility]GARDO : Hadii raquel ku hadasho Soomaali, waaxda luqadaha, qaybta kaalmada adeegyada, waxay idiin hayaan quincy hook . So [facility] [phone].    ATENCIÓN: Si habla español, tiene a hand disposición servicios gratuitos de asistencia lingüística. [facility] [phone].    We comply with applicable federal civil rights laws and [place] laws. We do not discriminate on the basis of race, color, national origin, age, disability, sex, sexual orientation, or gender identity.            Thank you!     Thank you for choosing [facility]  for your care. Our goal is always to provide you with excellent care. Hearing back from our patients is one way we can continue to improve our services. Please take a few minutes to complete the written survey that you may receive in the mail after your visit with us. Thank you!             Your Updated Medication List - Protect others around you: Learn  how to safely use, store and throw away your medicines at www.disposemymeds.org.          This list is accurate as of 6/15/18  1:43 PM.  Always use your most recent med list.                   Brand Name Dispense Instructions for use Diagnosis    * buPROPion 150 MG 24 hr tablet    WELLBUTRIN XL    90 tablet    Take 1 tablet (150 mg) by mouth every morning    Dysthymic disorder       * buPROPion 300 MG 24 hr tablet    WELLBUTRIN XL    90 tablet    Take 1 tablet (300 mg) by mouth every morning    Dysthymic disorder       CLARITIN 10 MG tablet   Generic drug:  loratadine      1 TABLET DAILY prn for allergies        DAILY MULTIVITAMIN PO      Take  by mouth daily.        FISH OIL PO      Take  by mouth daily.        ibuprofen 600 MG tablet    ADVIL/MOTRIN    0    1 tab prn for pain        NYQUIL PO           sildenafil 100 MG tablet    VIAGRA    12 tablet    Take 1 tablet (100 mg) by mouth daily as needed Take 30 min to 4 hours before intercourse.  Never use with nitroglycerin, terazosin or doxazosin.    Erectile dysfunction, unspecified erectile dysfunction type       triamcinolone 0.1 % cream    KENALOG    45 g    Apply topically 2 times daily as needed for irritation Apply sparingly to affected area three times daily as needed    Rash and nonspecific skin eruption       TYLENOL 325 MG tablet   Generic drug:  acetaminophen     100    ONE TO TWO TABLETS EVERY 4 TO 6 HOURS AS NEEDED FOR PAIN        UNABLE TO FIND      MEDICATION NAME: Zinc        VITAMIN B6 PO      Take by mouth daily        * Notice:  This list has 2 medication(s) that are the same as other medications prescribed for you. Read the directions carefully, and ask your doctor or other care provider to review them with you.

## 2018-06-15 NOTE — PROGRESS NOTES
"  SUBJECTIVE:   CC: Yovn Dunlap is an 47 year old male who presents for preventative health visit.     Healthy Habits:    Do you get at least three servings of calcium containing foods daily (dairy, green leafy vegetables, etc.)? {YES/NO, DAIRY INTAKE:019776::\"yes\"}    Amount of exercise or daily activities, outside of work: {AMOUNT EXERCISE:133139}    Problems taking medications regularly {Yes /No default:775212::\"No\"}    Medication side effects: {Yes /No default.:484505::\"No\"}    Have you had an eye exam in the past two years? {YESNOBLANK:660693}    Do you see a dentist twice per year? {YESNOBLANK:768463}    Do you have sleep apnea, excessive snoring or daytime drowsiness?{YESNOBLANK:098723}  {Outside tests to abstract? :213765}     {additional problems to add (Optional):024712}    Today's PHQ-2 Score:   PHQ-2 ( 1999 [employer]) 6/12/2018 2/8/2016   Q1: Little interest or pleasure in doing things 0 0   Q2: Feeling down, depressed or hopeless 1 1   PHQ-2 Score 1 1   Q1: Little interest or pleasure in doing things Not at all -   Q2: Feeling down, depressed or hopeless Several days -   PHQ-2 Score 1 -     {PHQ-2 LOOK IN ASSESSMENTS :244082}  Abuse: Current or Past(Physical, Sexual or Emotional)- {YES/NO/NA:958927}  Do you feel safe in your environment - {YES/NO/NA:101475}    Social History   Substance Use Topics     Smoking status: Never Smoker     Smokeless tobacco: Never Used     Alcohol use Yes      Comment: moderate, 2 beers per day      If you drink alcohol do you typically have >3 drinks per day or >7 drinks per week? {ETOH :107934}                      Last PSA:   PSA   Date Value Ref Range Status   02/08/2016 1.39 0 - 4 ug/L Final       Reviewed orders with patient. Reviewed health maintenance and updated orders accordingly - {Yes/No:496798::\"Yes\"}  {Chronicprobdata (Optional):399921}    Reviewed and updated as needed this visit by clinical staff         Reviewed and updated as needed this visit by " "Provider        {HISTORY OPTIONS (Optional):150408}    ROS:  {MALE ROS-adult preventive care package:035973::\"CONSTITUTIONAL: NEGATIVE for fever, chills, change in weight\",\"INTEGUMENTARY/SKIN: NEGATIVE for worrisome rashes, moles or lesions\",\"EYES: NEGATIVE for vision changes or irritation\",\"ENT: NEGATIVE for ear, mouth and throat problems\",\"RESP: NEGATIVE for significant cough or SOB\",\"CV: NEGATIVE for chest pain, palpitations or peripheral edema\",\"GI: NEGATIVE for nausea, abdominal pain, heartburn, or change in bowel habits\",\" male: negative for dysuria, hematuria, decreased urinary stream, erectile dysfunction, urethral discharge\",\"MUSCULOSKELETAL: NEGATIVE for significant arthralgias or myalgia\",\"NEURO: NEGATIVE for weakness, dizziness or paresthesias\",\"PSYCHIATRIC: NEGATIVE for changes in mood or affect\"}    OBJECTIVE:   There were no vitals taken for this visit.  EXAM:  {Exam Choices:045682}    ASSESSMENT/PLAN:   {Diag Picklist:656857}    COUNSELING:  {MALE COUNSELING MESSAGES:190447::\"Reviewed preventive health counseling, as reflected in patient instructions\"}    {BP Counseling- Complete if BP >= 120/80  (Optional):100435}   reports that he has never smoked. He has never used smokeless tobacco.  {Tobacco Cessation -- Complete if patient is a smoker (Optional):221608}  Estimated body mass index is 29.11 kg/(m^2) as calculated from the following:    Height as of 8/17/17: 5' 9.5\" (1.765 m).    Weight as of 8/17/17: 200 lb (90.7 kg).   {Weight Management Plan (ACO) Complete if BMI is abnormal-  Ages 18-64  BMI >24.9.  Age 65+ with BMI <23 or >30 (Optional):506421}    Counseling Resources:  ATP IV Guidelines  Pooled Cohorts Equation Calculator  FRAX Risk Assessment  ICSI Preventive Guidelines  Dietary Guidelines for Americans, 2010  USDA's MyPlate  ASA Prophylaxis  Lung CA Screening    PIERRE HARDWICK PA-C  [facility]  "

## 2018-06-16 LAB
CHOLEST SERPL-MCNC: 181 MG/DL
GLUCOSE SERPL-MCNC: 80 MG/DL (ref 70–99)
HDLC SERPL-MCNC: 49 MG/DL
LDLC SERPL CALC-MCNC: 104 MG/DL
NONHDLC SERPL-MCNC: 132 MG/DL
TRIGL SERPL-MCNC: 138 MG/DL

## 2018-06-16 ASSESSMENT — PATIENT HEALTH QUESTIONNAIRE - PHQ9: SUM OF ALL RESPONSES TO PHQ QUESTIONS 1-9: 4

## 2018-06-17 LAB — DEPRECATED CALCIDIOL+CALCIFEROL SERPL-MC: 32 UG/L (ref 20–75)

## 2018-12-08 DIAGNOSIS — N52.9 ERECTILE DYSFUNCTION, UNSPECIFIED ERECTILE DYSFUNCTION TYPE: ICD-10-CM

## 2018-12-10 NOTE — TELEPHONE ENCOUNTER
"Requested Prescriptions   Pending Prescriptions Disp Refills     sildenafil (VIAGRA) 100 MG tablet [Pharmacy Med Name: SILDENAFIL 100 MG TABLET]  Last Written Prescription Date:  6/15/2018  Last Fill Quantity: 12 tablet,  # refills: 11   Last office visit: 6/15/2018 with prescribing provider:  JAMIE Huizar   Future Office Visit:   12 tablet 3     Sig: TAKE 1 TABLET BY MOUTH DAILY AS NEEDED. TAKE 30 MINS-4 HRS PRIOR TO INTERCOURSE    Erectile Dysfuction Protocol Passed - 12/8/2018  1:32 PM       Passed - Absence of nitrates on medication list       Passed - Absence of Alpha Blockers on Med list       Passed - Recent (12 mo) or future (30 days) visit within the authorizing provider's specialty    Patient had office visit in the last 12 months or has a visit in the next 30 days with authorizing provider or within the authorizing provider's specialty.  See \"Patient Info\" tab in inbasket, or \"Choose Columns\" in Meds & Orders section of the refill encounter.           Passed - Patient is age 18 or older          "

## 2018-12-11 DIAGNOSIS — F34.1 DYSTHYMIC DISORDER: ICD-10-CM

## 2018-12-11 RX ORDER — SILDENAFIL 100 MG/1
TABLET, FILM COATED ORAL
Qty: 12 TABLET | Refills: 3 | Status: SHIPPED | OUTPATIENT
Start: 2018-12-11 | End: 2020-11-16

## 2018-12-11 NOTE — TELEPHONE ENCOUNTER
"Requested Prescriptions   Pending Prescriptions Disp Refills     buPROPion (WELLBUTRIN XL) 300 MG 24 hr tablet [Pharmacy Med Name: BUPROPION HCL  MG TABLET]  Last Written Prescription Date:  8/17/2017  Last Fill Quantity: 90 tablet,  # refills: 3   Last office visit: 6/15/2018 with prescribing provider:  JAMIE Huizar   Future Office Visit:     90 tablet 0     Sig: TAKE 1 TABLET (300 MG) BY MOUTH EVERY MORNING    SSRIs Protocol Passed - 12/11/2018  1:35 PM       Passed - PHQ-9 score less than 5 in past 6 months    Please review last PHQ-9 score.   PHQ-9 SCORE 5/22/2017 8/17/2017 6/15/2018   PHQ-9 Total Score - - -   PHQ-9 Total Score 10 5 4     SOWMYA-7 SCORE 9/9/2013   Total Score 1          Passed - Medication is Bupropion    If the medication is Bupropion (Wellbutrin), and the patient is taking for smoking cessation; OK to refill.         Passed - Patient is age 18 or older       Passed - Recent (6 mo) or future (30 days) visit within the authorizing provider's specialty    Patient had office visit in the last 6 months or has a visit in the next 30 days with authorizing provider or within the authorizing provider's specialty.  See \"Patient Info\" tab in inbasket, or \"Choose Columns\" in Meds & Orders section of the refill encounter.                    buPROPion (WELLBUTRIN XL) 150 MG 24 hr tablet [Pharmacy Med Name: BUPROPION HCL  MG TABLET]  Last Written Prescription Date:  8/17/2017  Last Fill Quantity: 90 tablet,  # refills: 3   Last office visit: 6/15/2018 with prescribing provider:  JAMIE Huizar   Future Office Visit:     90 tablet 0     Sig: TAKE 1 TABLET (150 MG) BY MOUTH EVERY MORNING    SSRIs Protocol Passed - 12/11/2018  1:35 PM       Passed - PHQ-9 score less than 5 in past 6 months    Please review last PHQ-9 score.   PHQ-9 SCORE 5/22/2017 8/17/2017 6/15/2018   PHQ-9 Total Score - - -   PHQ-9 Total Score 10 5 4     SOWMYA-7 SCORE 9/9/2013   Total Score 1          Passed - Medication is Bupropion    If " "the medication is Bupropion (Wellbutrin), and the patient is taking for smoking cessation; OK to refill.         Passed - Patient is age 18 or older       Passed - Recent (6 mo) or future (30 days) visit within the authorizing provider's specialty    Patient had office visit in the last 6 months or has a visit in the next 30 days with authorizing provider or within the authorizing provider's specialty.  See \"Patient Info\" tab in inbasket, or \"Choose Columns\" in Meds & Orders section of the refill encounter.              "

## 2018-12-11 NOTE — TELEPHONE ENCOUNTER
Prescription approved per [facility] Refill Protocol.  Deirdre Mccloud RN

## 2018-12-12 DIAGNOSIS — F34.1 DYSTHYMIC DISORDER: ICD-10-CM

## 2018-12-12 RX ORDER — BUPROPION HYDROCHLORIDE 150 MG/1
150 TABLET ORAL EVERY MORNING
Qty: 90 TABLET | Refills: 0 | Status: SHIPPED | OUTPATIENT
Start: 2018-12-12 | End: 2018-12-12

## 2018-12-12 RX ORDER — BUPROPION HYDROCHLORIDE 300 MG/1
300 TABLET ORAL EVERY MORNING
Qty: 90 TABLET | Refills: 0 | Status: SHIPPED | OUTPATIENT
Start: 2018-12-12 | End: 2018-12-12

## 2018-12-12 NOTE — TELEPHONE ENCOUNTER
Prescription approved per [facility] Refill Protocol.    Mitra Ramos RN

## 2018-12-12 NOTE — TELEPHONE ENCOUNTER
"Requested Prescriptions   Pending Prescriptions Disp Refills     buPROPion (WELLBUTRIN XL) 150 MG 24 hr tablet [Pharmacy Med Name: BUPROPION HCL  MG TABLET]  E-Prescribing Status: Transmission to pharmacy failed (12/12/2018  3:01 PM CST)  Last Written Prescription Date:  12/12/2018  Last Fill Quantity: 90 tablet,  # refills: 0   Last office visit: 6/15/2018 with prescribing provider:  JAMIE Huizar   Future Office Visit:     90 tablet 0     Sig: TAKE 1 TABLET (150 MG) BY MOUTH EVERY MORNING    SSRIs Protocol Passed - 12/12/2018  3:35 PM       Passed - PHQ-9 score less than 5 in past 6 months    Please review last PHQ-9 score.   PHQ-9 SCORE 5/22/2017 8/17/2017 6/15/2018   PHQ-9 Total Score - - -   PHQ-9 Total Score 10 5 4     SOWMYA-7 SCORE 9/9/2013   Total Score 1          Passed - Medication is Bupropion    If the medication is Bupropion (Wellbutrin), and the patient is taking for smoking cessation; OK to refill.         Passed - Patient is age 18 or older       Passed - Recent (6 mo) or future (30 days) visit within the authorizing provider's specialty    Patient had office visit in the last 6 months or has a visit in the next 30 days with authorizing provider or within the authorizing provider's specialty.  See \"Patient Info\" tab in inbasket, or \"Choose Columns\" in Meds & Orders section of the refill encounter.                  buPROPion (WELLBUTRIN XL) 300 MG 24 hr tablet [Pharmacy Med Name: BUPROPION HCL  MG TABLET]  E-Prescribing Status: Transmission to pharmacy failed (12/12/2018  3:01 PM CST)  Last Written Prescription Date:  12/12/2018  Last Fill Quantity: 90 tablet,  # refills: 0   Last office visit: 6/15/2018 with prescribing provider:  JAMIE Huizar   Future Office Visit:     90 tablet 0     Sig: TAKE 1 TABLET (300 MG) BY MOUTH EVERY MORNING    SSRIs Protocol Passed - 12/12/2018  3:35 PM       Passed - PHQ-9 score less than 5 in past 6 months    Please review last PHQ-9 score.     PHQ-9 SCORE 5/22/2017 " "8/17/2017 6/15/2018   PHQ-9 Total Score - - -   PHQ-9 Total Score 10 5 4     SOWMYA-7 SCORE 9/9/2013   Total Score 1          Passed - Medication is Bupropion    If the medication is Bupropion (Wellbutrin), and the patient is taking for smoking cessation; OK to refill.         Passed - Patient is age 18 or older       Passed - Recent (6 mo) or future (30 days) visit within the authorizing provider's specialty    Patient had office visit in the last 6 months or has a visit in the next 30 days with authorizing provider or within the authorizing provider's specialty.  See \"Patient Info\" tab in inbasket, or \"Choose Columns\" in Meds & Orders section of the refill encounter.              "

## 2018-12-13 RX ORDER — BUPROPION HYDROCHLORIDE 150 MG/1
150 TABLET ORAL EVERY MORNING
Qty: 90 TABLET | Refills: 0 | Status: SHIPPED | OUTPATIENT
Start: 2018-12-13 | End: 2019-03-21

## 2018-12-13 RX ORDER — BUPROPION HYDROCHLORIDE 300 MG/1
300 TABLET ORAL EVERY MORNING
Qty: 90 TABLET | Refills: 0 | Status: SHIPPED | OUTPATIENT
Start: 2018-12-13 | End: 2019-03-21

## 2018-12-13 NOTE — TELEPHONE ENCOUNTER
Prescription approved per [facility] Refill Protocol.  Mitra Ramos RN

## 2019-03-21 ENCOUNTER — OFFICE VISIT (OUTPATIENT)
Dept: FAMILY MEDICINE | Facility: CLINIC | Age: 49
End: 2019-03-21
Payer: COMMERCIAL

## 2019-03-21 VITALS
DIASTOLIC BLOOD PRESSURE: 86 MMHG | BODY MASS INDEX: 29.46 KG/M2 | HEART RATE: 88 BPM | HEIGHT: 70 IN | TEMPERATURE: 98.6 F | SYSTOLIC BLOOD PRESSURE: 124 MMHG | WEIGHT: 205.8 LBS

## 2019-03-21 DIAGNOSIS — F34.1 DYSTHYMIC DISORDER: ICD-10-CM

## 2019-03-21 DIAGNOSIS — R07.0 THROAT PAIN: Primary | ICD-10-CM

## 2019-03-21 DIAGNOSIS — F40.298 SPECIFIC PHOBIA: ICD-10-CM

## 2019-03-21 LAB
DEPRECATED S PYO AG THROAT QL EIA: NORMAL
SPECIMEN SOURCE: NORMAL

## 2019-03-21 PROCEDURE — 87880 STREP A ASSAY W/OPTIC: CPT | Performed by: PHYSICIAN ASSISTANT

## 2019-03-21 PROCEDURE — 87081 CULTURE SCREEN ONLY: CPT | Performed by: PHYSICIAN ASSISTANT

## 2019-03-21 PROCEDURE — 99214 OFFICE O/P EST MOD 30 MIN: CPT | Performed by: PHYSICIAN ASSISTANT

## 2019-03-21 RX ORDER — BUPROPION HYDROCHLORIDE 150 MG/1
150 TABLET ORAL EVERY MORNING
Qty: 90 TABLET | Refills: 0 | Status: SHIPPED | OUTPATIENT
Start: 2019-03-21 | End: 2019-06-07

## 2019-03-21 RX ORDER — PROPRANOLOL HYDROCHLORIDE 20 MG/1
TABLET ORAL
Qty: 30 TABLET | Refills: 1 | Status: SHIPPED | OUTPATIENT
Start: 2019-03-21 | End: 2020-07-27

## 2019-03-21 RX ORDER — BUPROPION HYDROCHLORIDE 300 MG/1
300 TABLET ORAL EVERY MORNING
Qty: 90 TABLET | Refills: 0 | Status: SHIPPED | OUTPATIENT
Start: 2019-03-21 | End: 2019-06-07

## 2019-03-21 ASSESSMENT — MIFFLIN-ST. JEOR: SCORE: 1801.81

## 2019-03-21 ASSESSMENT — PATIENT HEALTH QUESTIONNAIRE - PHQ9: SUM OF ALL RESPONSES TO PHQ QUESTIONS 1-9: 9

## 2019-03-21 NOTE — PROGRESS NOTES
SUBJECTIVE:   Yvon Dunlap is a 48 year old male who presents to clinic today for the following health issues:    Depression Followup    Status since last visit: Stable, medication has not taken medication for a while    See PHQ-9 for current symptoms.  Other associated symptoms: Feeling down and depressed.     Complicating factors:   Significant life event:  Nothing new    Current substance abuse:  None  Anxiety or Panic symptoms:  Yes, high stress causes patient to not do things   Been off meds 3 months  Mood is lower    Wonders about something to take prior to public speaking and interviews. Tends to get anxious. Wife is on Lorazepam. Wonders if that would work.      PHQ 5/22/2017 8/17/2017 6/15/2018   PHQ-9 Total Score 10 5 4   Q9: Suicide Ideation Not at all Not at all Not at all     PHQ-9  English  PHQ-9   Any Language  Suicide Assessment Five-step Evaluation and Treatment (SAFE-T)    Amount of exercise or physical activity: taking care of children     Problems taking medications regularly: No    Medication side effects: none    Diet: regular (no restrictions)    Acute Illness   Acute illness concerns: Sore throat, cough  Onset: 5 days ago.     Fever: no     Chills/Sweats: no     Headache (location?): YES- slight    Sinus Pressure:no    Conjunctivitis:  no    Ear Pain: no    Rhinorrhea: YES    Congestion: YES    Sore Throat: YES     Cough: YES    Wheeze: YES    Decreased Appetite: no     Nausea: no     Vomiting: no     Diarrhea:  no     Dysuria/Freq.: no     Fatigue/Achiness: YES, fatigue     Sick/Strep Exposure: no, child has a cough     Therapies Tried and outcome: nyquil and dayquil  Problem list and histories reviewed & adjusted, as indicated.  Additional history: as documented    Labs reviewed in EPIC    Reviewed and updated as needed this visit by clinical staff  Tobacco  Allergies  Meds  Med Hx  Surg Hx  Fam Hx  Soc Hx      Reviewed and updated as needed this visit by Provider      "    ROS:  Constitutional, HEENT, cardiovascular, pulmonary, gi and gu systems are negative, except as otherwise noted.    OBJECTIVE:     /86 (BP Location: Right arm, Patient Position: Chair, Cuff Size: Adult Regular)   Pulse 88   Temp 98.6  F (37  C) (Oral)   Ht 1.765 m (5' 9.5\")   Wt 93.4 kg (205 lb 12.8 oz)   BMI 29.96 kg/m    Body mass index is 29.96 kg/m .  GENERAL: healthy, alert and no distress  HENT: ear canals and TM's normal, nose and mouth without ulcers or lesions  NECK: no adenopathy, no asymmetry, masses, or scars and thyroid normal to palpation  RESP: lungs clear to auscultation - no rales, rhonchi or wheezes  CV: regular rate and rhythm, normal S1 S2, no S3 or S4, no murmur, click or rub, no peripheral edema and peripheral pulses strong  Psych: Appropriate appearance.  Alert and oriented times 3; coherent speech, normal   rate and volume, able to articulate logical thoughts, able   to abstract reason, no tangential thoughts, no hallucinations   or delusions.  Normal behavior.  His affect is bright.      ASSESSMENT/PLAN:     (R07.0) Throat pain  (primary encounter diagnosis)  Comment:   Plan: Rapid strep screen, Beta strep group A culture        Viral. Symptomatic measures and suggestions for self care given.  Follow up if not improving or symptoms change as discussed.  All questions were answered.     (F34.1) Dysthymic disorder  Comment:   Plan: buPROPion (WELLBUTRIN XL) 150 MG 24 hr tablet,         buPROPion (WELLBUTRIN XL) 300 MG 24 hr tablet        Worse - reviewed options. Recommend restart meds. Consider therapy. He will check back in with me in 3-4 weeks, earlier if issues.    (F40.298) Specific phobia  Comment:   Plan: propranolol (INDERAL) 20 MG tablet        Reviewed options. Trial propranolol for performance anxiety.     PIERRE HARDWICK PA-C  [facility]    "

## 2019-03-22 LAB
BACTERIA SPEC CULT: NORMAL
SPECIMEN SOURCE: NORMAL

## 2019-06-04 ENCOUNTER — TELEPHONE (OUTPATIENT)
Dept: FAMILY MEDICINE | Facility: CLINIC | Age: 49
End: 2019-06-04

## 2019-06-04 DIAGNOSIS — R21 RASH AND NONSPECIFIC SKIN ERUPTION: ICD-10-CM

## 2019-06-04 DIAGNOSIS — F34.1 DYSTHYMIC DISORDER: ICD-10-CM

## 2019-06-04 NOTE — TELEPHONE ENCOUNTER
Reason for Call:  Medication or medication refill:     Do you use a [facility]?  Name of the pharmacy and phone number for the current request:  [facility] [address], [phone]    Name of the medication requested:triamcinolone (KENALOG) 0.1 % cream     Other request: PT states has no refills left for Bupropion 150mg or 300mg and would like to be able to refill next time.  Just filled so no rush.    Can we leave a detailed message on this number? YES    Phone number patient can be reached at: Cell number on file:    Telephone Information:   Mobile [phone]     Best Time: Anytime    Call taken on 6/4/2019 at 5:06 PM by Diane Maruicio

## 2019-06-07 ENCOUNTER — MYC MEDICAL ADVICE (OUTPATIENT)
Dept: FAMILY MEDICINE | Facility: CLINIC | Age: 49
End: 2019-06-07

## 2019-06-07 RX ORDER — BUPROPION HYDROCHLORIDE 150 MG/1
150 TABLET ORAL EVERY MORNING
Qty: 90 TABLET | Refills: 0 | Status: SHIPPED | OUTPATIENT
Start: 2019-06-07 | End: 2019-10-12

## 2019-06-07 RX ORDER — TRIAMCINOLONE ACETONIDE 1 MG/G
CREAM TOPICAL 2 TIMES DAILY PRN
Qty: 45 G | Refills: 1 | Status: SHIPPED | OUTPATIENT
Start: 2019-06-07

## 2019-06-07 RX ORDER — BUPROPION HYDROCHLORIDE 300 MG/1
300 TABLET ORAL EVERY MORNING
Qty: 90 TABLET | Refills: 0 | Status: SHIPPED | OUTPATIENT
Start: 2019-06-07 | End: 2019-10-12

## 2019-06-07 NOTE — TELEPHONE ENCOUNTER
Refills sent. I want him to check in to let me know how he's doing. A quick mychart message is fine.    Thanks.  Lio Huizar, MPAS, PA-C

## 2019-06-10 NOTE — TELEPHONE ENCOUNTER
Patient has replied to MyChart that RN sent in another encounter, so will close this one.    Vickie Samuel RN

## 2019-06-10 NOTE — TELEPHONE ENCOUNTER
"Route MyChart update to PCP as FYI for when you return. You had wanted us to reach out to see how he is doing.  MyChart sent.  No further action needed.     Vickie Samuel RN      6/5/19  \"Refills sent. I want him to check in to let me know how he's doing. A quick mychart message is fine.     Thanks.  Lio Huizar MPAS, PA-C\"      "

## 2019-07-08 DIAGNOSIS — F34.1 DYSTHYMIC DISORDER: ICD-10-CM

## 2019-07-08 NOTE — TELEPHONE ENCOUNTER
"Requested Prescriptions   Pending Prescriptions Disp Refills     buPROPion (WELLBUTRIN XL) 150 MG 24 hr tablet [Pharmacy Med Name: BUPROPION HCL  MG TABLET]  Last Written Prescription Date:  6/7/2019  Last Fill Quantity: 90 tabs,  # refills: 0   Last office visit: 3/21/2019 with prescribing provider:  Bhupendra   Future Office Visit:     90 tablet 0     Sig: TAKE 1 TABLET (150 MG) BY MOUTH EVERY MORNING.       SSRIs Protocol Failed - 7/8/2019  1:13 PM        Failed - PHQ-9 score less than 5 in past 6 months     Please review last PHQ-9 score.           Passed - Medication is Bupropion     If the medication is Bupropion (Wellbutrin), and the patient is taking for smoking cessation; OK to refill.          Passed - Medication is active on med list        Passed - Patient is age 18 or older        Passed - Recent (6 mo) or future (30 days) visit within the authorizing provider's specialty     Patient had office visit in the last 6 months or has a visit in the next 30 days with authorizing provider or within the authorizing provider's specialty.  See \"Patient Info\" tab in inbasket, or \"Choose Columns\" in Meds & Orders section of the refill encounter.              "

## 2019-07-09 RX ORDER — BUPROPION HYDROCHLORIDE 150 MG/1
150 TABLET ORAL EVERY MORNING
Qty: 90 TABLET | Refills: 0 | OUTPATIENT
Start: 2019-07-09

## 2019-09-30 ENCOUNTER — HEALTH MAINTENANCE LETTER (OUTPATIENT)
Age: 49
End: 2019-09-30

## 2019-10-12 DIAGNOSIS — F34.1 DYSTHYMIC DISORDER: ICD-10-CM

## 2019-10-14 NOTE — TELEPHONE ENCOUNTER
"Requested Prescriptions   Pending Prescriptions Disp Refills     buPROPion (WELLBUTRIN XL) 150 MG 24 hr tablet [Pharmacy Med Name: BUPROPION HCL  MG TABLET]  Last Written Prescription Date:  6/7/2019  Last Fill Quantity: 90 tablet,  # refills: 0   Last office visit: 3/21/2019 with prescribing provider:  JAMIE Huizar   Future Office Visit:   90 tablet 0     Sig: TAKE 1 TABLET (150 MG) BY MOUTH EVERY MORNING       SSRIs Protocol Failed - 10/12/2019 12:19 AM        Failed - PHQ-9 score less than 5 in past 6 months     Please review last PHQ-9 score.   PHQ-9 SCORE 8/17/2017 6/15/2018 3/21/2019   PHQ-9 Total Score - - -   PHQ-9 Total Score 5 4 9     SOWMYA-7 SCORE 9/9/2013   Total Score 1           Failed - Recent (6 mo) or future (30 days) visit within the authorizing provider's specialty     Patient had office visit in the last 6 months or has a visit in the next 30 days with authorizing provider or within the authorizing provider's specialty.  See \"Patient Info\" tab in inbasket, or \"Choose Columns\" in Meds & Orders section of the refill encounter.            Passed - Medication is Bupropion     If the medication is Bupropion (Wellbutrin), and the patient is taking for smoking cessation; OK to refill.          Passed - Medication is active on med list        Passed - Patient is age 18 or older                buPROPion (WELLBUTRIN XL) 300 MG 24 hr tablet [Pharmacy Med Name: BUPROPION HCL  MG TABLET]  Last Written Prescription Date:  6/7/2019  Last Fill Quantity: 90 tablet,  # refills: 0   Last office visit: 3/21/2019 with prescribing provider:  JAMIE Huizar   Future Office Visit:   90 tablet 0     Sig: TAKE 1 TABLET (300 MG) BY MOUTH EVERY MORNING       SSRIs Protocol Failed - 10/12/2019 12:19 AM        Failed - PHQ-9 score less than 5 in past 6 months     Please review last PHQ-9 score.   PHQ-9 SCORE 8/17/2017 6/15/2018 3/21/2019   PHQ-9 Total Score - - -   PHQ-9 Total Score 5 4 9     SOWMYA-7 SCORE 9/9/2013   Total " "Score 1                 Failed - Recent (6 mo) or future (30 days) visit within the authorizing provider's specialty     Patient had office visit in the last 6 months or has a visit in the next 30 days with authorizing provider or within the authorizing provider's specialty.  See \"Patient Info\" tab in inbasket, or \"Choose Columns\" in Meds & Orders section of the refill encounter.            Passed - Medication is Bupropion     If the medication is Bupropion (Wellbutrin), and the patient is taking for smoking cessation; OK to refill.          Passed - Medication is active on med list        Passed - Patient is age 18 or older        "

## 2019-10-15 NOTE — TELEPHONE ENCOUNTER
Route to PCP for follow up plan and consideration of refills.  Previously in June, you refilled after patient sent [employer] message stating he was doing well on medications.  Do you want him to be seen in clinic (or telephone visit) or have us do PHQ-9 over the phone/MyChart?    Dany King RN

## 2019-10-16 RX ORDER — BUPROPION HYDROCHLORIDE 150 MG/1
150 TABLET ORAL EVERY MORNING
Qty: 90 TABLET | Refills: 1 | Status: SHIPPED | OUTPATIENT
Start: 2019-10-16 | End: 2020-07-09

## 2019-10-16 RX ORDER — BUPROPION HYDROCHLORIDE 300 MG/1
300 TABLET ORAL EVERY MORNING
Qty: 90 TABLET | Refills: 1 | Status: SHIPPED | OUTPATIENT
Start: 2019-10-16 | End: 2020-07-27

## 2019-10-28 ENCOUNTER — HEALTH MAINTENANCE LETTER (OUTPATIENT)
Age: 49
End: 2019-10-28

## 2020-07-09 ENCOUNTER — VIRTUAL VISIT (OUTPATIENT)
Dept: FAMILY MEDICINE | Facility: CLINIC | Age: 50
End: 2020-07-09
Payer: COMMERCIAL

## 2020-07-09 ENCOUNTER — HOSPITAL ENCOUNTER (EMERGENCY)
Facility: CLINIC | Age: 50
Discharge: STILL ADMITTED OR ADMINISTRATIVE | End: 2020-07-09

## 2020-07-09 ENCOUNTER — TELEPHONE (OUTPATIENT)
Dept: OTOLARYNGOLOGY | Facility: CLINIC | Age: 50
End: 2020-07-09

## 2020-07-09 ENCOUNTER — HOSPITAL ENCOUNTER (EMERGENCY)
Facility: CLINIC | Age: 50
Discharge: HOME | End: 2020-07-09
Attending: EMERGENCY MEDICINE | Admitting: EMERGENCY MEDICINE
Payer: COMMERCIAL

## 2020-07-09 ENCOUNTER — HOSPITAL ENCOUNTER (EMERGENCY)
Facility: CLINIC | Age: 50
Discharge: HOME | End: 2020-07-09
Attending: EMERGENCY MEDICINE
Payer: COMMERCIAL

## 2020-07-09 VITALS
OXYGEN SATURATION: 99 % | HEART RATE: 77 BPM | TEMPERATURE: 98.3 F | DIASTOLIC BLOOD PRESSURE: 116 MMHG | SYSTOLIC BLOOD PRESSURE: 161 MMHG

## 2020-07-09 VITALS
DIASTOLIC BLOOD PRESSURE: 99 MMHG | HEART RATE: 83 BPM | BODY MASS INDEX: 31.29 KG/M2 | OXYGEN SATURATION: 98 % | SYSTOLIC BLOOD PRESSURE: 130 MMHG | WEIGHT: 215 LBS | RESPIRATION RATE: 16 BRPM | TEMPERATURE: 97.6 F

## 2020-07-09 DIAGNOSIS — R04.0 EPISTAXIS: ICD-10-CM

## 2020-07-09 DIAGNOSIS — J34.89 NASAL PAIN: ICD-10-CM

## 2020-07-09 DIAGNOSIS — R04.0 EPISTAXIS: Primary | ICD-10-CM

## 2020-07-09 LAB
BASOPHILS # BLD AUTO: 0.1 10E9/L (ref 0–0.2)
BASOPHILS NFR BLD AUTO: 0.7 %
DIFFERENTIAL METHOD BLD: NORMAL
EOSINOPHIL # BLD AUTO: 0.1 10E9/L (ref 0–0.7)
EOSINOPHIL NFR BLD AUTO: 1.5 %
ERYTHROCYTE [DISTWIDTH] IN BLOOD BY AUTOMATED COUNT: 13.2 % (ref 10–15)
HCT VFR BLD AUTO: 41.2 % (ref 40–53)
HGB BLD-MCNC: 13.7 G/DL (ref 13.3–17.7)
IMM GRANULOCYTES # BLD: 0.2 10E9/L (ref 0–0.4)
IMM GRANULOCYTES NFR BLD: 1.8 %
LYMPHOCYTES # BLD AUTO: 2.6 10E9/L (ref 0.8–5.3)
LYMPHOCYTES NFR BLD AUTO: 31.1 %
MCH RBC QN AUTO: 29.6 PG (ref 26.5–33)
MCHC RBC AUTO-ENTMCNC: 33.3 G/DL (ref 31.5–36.5)
MCV RBC AUTO: 89 FL (ref 78–100)
MONOCYTES # BLD AUTO: 0.5 10E9/L (ref 0–1.3)
MONOCYTES NFR BLD AUTO: 6.3 %
NEUTROPHILS # BLD AUTO: 4.8 10E9/L (ref 1.6–8.3)
NEUTROPHILS NFR BLD AUTO: 58.6 %
NRBC # BLD AUTO: 0 10*3/UL
NRBC BLD AUTO-RTO: 0 /100
PLATELET # BLD AUTO: 221 10E9/L (ref 150–450)
RBC # BLD AUTO: 4.63 10E12/L (ref 4.4–5.9)
WBC # BLD AUTO: 8.2 10E9/L (ref 4–11)

## 2020-07-09 PROCEDURE — 99282 EMERGENCY DEPT VISIT SF MDM: CPT | Performed by: EMERGENCY MEDICINE

## 2020-07-09 PROCEDURE — 25000125 ZZHC RX 250

## 2020-07-09 PROCEDURE — 99213 OFFICE O/P EST LOW 20 MIN: CPT | Mod: 95 | Performed by: PHYSICIAN ASSISTANT

## 2020-07-09 PROCEDURE — 25000132 ZZH RX MED GY IP 250 OP 250 PS 637

## 2020-07-09 PROCEDURE — 99284 EMERGENCY DEPT VISIT MOD MDM: CPT | Performed by: EMERGENCY MEDICINE

## 2020-07-09 PROCEDURE — 30901 CONTROL OF NOSEBLEED: CPT | Mod: LT | Performed by: EMERGENCY MEDICINE

## 2020-07-09 PROCEDURE — 30901 CONTROL OF NOSEBLEED: CPT | Performed by: EMERGENCY MEDICINE

## 2020-07-09 PROCEDURE — 85025 COMPLETE CBC W/AUTO DIFF WBC: CPT | Performed by: EMERGENCY MEDICINE

## 2020-07-09 PROCEDURE — 99284 EMERGENCY DEPT VISIT MOD MDM: CPT | Mod: 25 | Performed by: EMERGENCY MEDICINE

## 2020-07-09 RX ORDER — LIDOCAINE HYDROCHLORIDE AND EPINEPHRINE 10; 10 MG/ML; UG/ML
INJECTION, SOLUTION INFILTRATION; PERINEURAL
Status: COMPLETED
Start: 2020-07-09 | End: 2020-07-09

## 2020-07-09 RX ORDER — HYDROCODONE BITARTRATE AND ACETAMINOPHEN 5; 325 MG/1; MG/1
1-2 TABLET ORAL EVERY 4 HOURS PRN
Qty: 10 TABLET | Refills: 0 | Status: SHIPPED | OUTPATIENT
Start: 2020-07-09 | End: 2020-07-27

## 2020-07-09 RX ORDER — OXYMETAZOLINE HYDROCHLORIDE 0.05 G/100ML
2 SPRAY NASAL ONCE
Status: DISCONTINUED | OUTPATIENT
Start: 2020-07-09 | End: 2020-07-09

## 2020-07-09 RX ADMIN — LIDOCAINE HYDROCHLORIDE AND EPINEPHRINE: 10; 10 INJECTION, SOLUTION INFILTRATION; PERINEURAL at 06:37

## 2020-07-09 RX ADMIN — PHENYLEPHRINE HYDROCHLORIDE 1 SPRAY: 0.25 SPRAY NASAL at 03:05

## 2020-07-09 ASSESSMENT — ENCOUNTER SYMPTOMS
COUGH: 0
TROUBLE SWALLOWING: 0
SHORTNESS OF BREATH: 0

## 2020-07-09 NOTE — TELEPHONE ENCOUNTER
[facility] Call Center    Phone Message    May a detailed message be left on voicemail: yes     Reason for Call: Other: Pt was seen in ER last night for nose bleed.  Pt's discharge papers state to get him in within 2 to 3 days.  I was not able to find anything until 7/13/20.  Pt states he can't wait and needs to be seen ASAP for the pain.  Please follow up with the Pt.       Action Taken: Message routed to:  [facility] ([facility] ENT    Travel Screening: Not Applicable

## 2020-07-09 NOTE — Clinical Note
Pancho SANDHU - I did a phone visit with Sean today 7/9, he's seeing you Friday - doesn't need anything, he just has what he thinks is inflatable packing in his nose and I think it is just inflated a bit too much. If you remove 0.5ml to 1 ml of pressure with a 3 ml syringe, it might reduce his pain. If you have done this 100s of times, I apologize - I've only had to do this for a patient in pain 1 other time, and I'm not working in the clinic, otherwise I'd just do it. Let me know if you have questions. It doesn't need to be replaced. If for some weird reason it starts bleeding, he'd need the full pressure. I don't expect that it will. Thanks for seeing him Friday quick before the weekend / his ENT visit. LEIA Emerson, PA-C

## 2020-07-09 NOTE — TELEPHONE ENCOUNTER
Called patient to notify him of new appointment made with Dr. Jones related to ED referral (diagnosis) epistaxis. Writer left name and call back number.      Renee Rangel LPN

## 2020-07-09 NOTE — PROGRESS NOTES
"Yvon Dunlap is a 49 year old male who is being evaluated via a billable telephone visit.      The patient has been notified of following:     \"This telephone visit will be conducted via a call between you and your physician/provider. We have found that certain health care needs can be provided without the need for a physical exam.  This service lets us provide the care you need with a short phone conversation.  If a prescription is necessary we can send it directly to your pharmacy.  If lab work is needed we can place an order for that and you can then stop by our lab to have the test done at a later time.    Telephone visits are billed at different rates depending on your insurance coverage. During this emergency period, for some insurers they may be billed the same as an in-person visit.  Please reach out to your insurance provider with any questions.    If during the course of the call the physician/provider feels a telephone visit is not appropriate, you will not be charged for this service.\"    Patient has given verbal consent for Telephone visit?  Yes    What phone number would you like to be contacted at? [phone]    How would you like to obtain your AVS? Piero Diallo     Yvon Dunlap is a 49 year old male who presents via phone visit today for the following health issues:    [facility]  ED/UC Followup:    Facility:  [facility] ED  Date of visit: 07/09/2020  Reason for visit: Epistaxis   Current Status: Patient is not having nose bleeds and has packing in his left nare. Patient is having pain in his left nare 7/10.      Severe nose bleed. Packed in the ER. He's fairly certain they used an inflatable packing. He says it is packed tight and painful. 7/10. He can't sleep.    Denies other symptoms.    Patient Active Problem List   Diagnosis     Allergic rhinitis     HYPERLIPIDEMIA LDL GOAL <160     Erectile dysfunction     Family history of colon cancer     Family history of prostate cancer     " Dysthymic disorder     Epistaxis      Current Outpatient Medications   Medication     amoxicillin-clavulanate (AUGMENTIN) 875-125 MG tablet     HYDROcodone-acetaminophen (NORCO) 5-325 MG tablet     Multiple Vitamin (DAILY MULTIVITAMIN PO)     propranolol (INDERAL) 20 MG tablet     Pseudoeph-Doxylamine-DM-APAP (NYQUIL PO)     Pyridoxine HCl (VITAMIN B6 PO)     TYLENOL 325 MG OR TABS     buPROPion (WELLBUTRIN XL) 300 MG 24 hr tablet     CLARITIN 10 MG OR TABS     IBUPROFEN 600 MG OR TABS     Omega-3 Fatty Acids (FISH OIL PO)     phenylephrine (RONY-SYNEPHRINE) 0.25 % nasal spray     sildenafil (VIAGRA) 100 MG tablet     triamcinolone (KENALOG) 0.1 % external cream     UNABLE TO FIND     No current facility-administered medications for this visit.          Reviewed and updated as needed this visit by Provider         Review of Systems   Constitutional, HEENT, cardiovascular, pulmonary, gi and gu systems are negative, except as otherwise noted.       Objective   Reported vitals:  There were no vitals taken for this visit.   healthy, alert and no distress  PSYCH: Alert and oriented times 3; coherent speech, normal   rate and volume, able to articulate logical thoughts, able   to abstract reason, no tangential thoughts, no hallucinations   or delusions  His affect is normal  RESP: No cough, no audible wheezing, able to talk in full sentences  Remainder of exam unable to be completed due to telephone visits    Diagnostic Test Results:  Labs reviewed in Epic        Assessment/Plan:  1. Epistaxis  Has a packing in - will refer to ENT for cauterization if indicated. I will have my team contact him.    - OTOLARYNGOLOGY REFERRAL  - HYDROcodone-acetaminophen (NORCO) 5-325 MG tablet; Take 1-2 tablets by mouth every 4 hours as needed for moderate to severe pain  Dispense: 10 tablet; Refill: 0    2. Nasal pain  From the packing. He's saying he can't sleep. I can give pain meds temporarily.   Will need to see ENT. If unable to get in  with ENT in 24 hours, will have him see someone in one of our clinics, a small reduction in packing pressure may help get him to a visit with ENT if they aren't seeing patients on Fridays.    - HYDROcodone-acetaminophen (NORCO) 5-325 MG tablet; Take 1-2 tablets by mouth every 4 hours as needed for moderate to severe pain  Dispense: 10 tablet; Refill: 0    No follow-ups on file.      Phone call duration:  8 minutes    LEIA Emerson, PA-C

## 2020-07-09 NOTE — TELEPHONE ENCOUNTER
Called pt and let the know some one will call them tomorrow to help with the appt. Pt is aware we can not give pain meds since we haven't seen him.     Amee Cain LPN

## 2020-07-09 NOTE — Clinical Note
Can we call Dr. Senior or Miranda's office to see if they can see him Friday for a quick check? If NOT, would want someone in our clinic to see him tomorrow to possibly reduce the pressure of his nasal packing a little before ENT sees him. It is filled tight and he's got a good deal of pain. Please call him IF able to get him into ENT / let me know either way Thx.

## 2020-07-09 NOTE — ED NOTES
Patient instructed to blow nose, this writer would then spray neosyphrine, then re clamp. Large clot came out of nose with this. Patient feels some relief.

## 2020-07-09 NOTE — ED AVS SNAPSHOT
[facility], [place], Emergency Department  [address]  [facility] [address]  Phone:  [phone]  Fax:  [phone]                                    Yvon Dunlap   MRN: [id number]    Department:  [facility], Emergency Department   Date of Visit:  7/9/2020           After Visit Summary Signature Page    I have received my discharge instructions, and my questions have been answered. I have discussed any challenges I see with this plan with the nurse or doctor.    ..........................................................................................................................................  Patient/Patient Representative Signature      ..........................................................................................................................................  Patient Representative Print Name and Relationship to Patient    ..................................................               ................................................  Date                                   Time    ..........................................................................................................................................  Reviewed by Signature/Title    ...................................................              ..............................................  Date                                               Time          22EPIC Rev 08/18

## 2020-07-09 NOTE — ED PROVIDER NOTES
"ED Provider Note  [facility]      History     Chief Complaint   Patient presents with     Epistaxis     has had issues with nose bleeds in the past, had one on 7/4 that stopped, approximately 30 min pta had a \"gushing\" nose bleed, patient placed clamp on nose, has had a sense of nasal congestion since episode on the 4th     HPI  Yvon Dunlap is a 49 year old male who has a past medical history of epistaxis presenting with nosebleed out of the left nare.  Started approximately 40 minutes ago.  Does not feel down the back of his throat.  Denies dizziness or syncope.  He thinks that he lost a lot of blood.  He has had this in the past where he says he lost \"4 pints of blood\".  He did have cauterization in the ENT clinic.  Denies any trauma or nose picking tonight.  Denies cocaine use.  Denies bleeding or clotting disorders.  He has not had significant nosebleeds for 6 hours.  Denies fevers, chills, chest pain or shortness of breath.    Past Medical History  Past Medical History:   Diagnosis Date     ALLERGIC RHINITIS NOS 5/8/2006     Depressive disorder Sometime in the 1990s, I think     Depressive disorder, not elsewhere classified      Family history of colon cancer      Other and unspecified hyperlipidemia      Past Surgical History:   Procedure Laterality Date     COLONOSCOPY  May 2016     HERNIA REPAIR  2008 or 2009     NO HISTORY OF SURGERY       buPROPion (WELLBUTRIN XL) 150 MG 24 hr tablet  buPROPion (WELLBUTRIN XL) 300 MG 24 hr tablet  CLARITIN 10 MG OR TABS  IBUPROFEN 600 MG OR TABS  Multiple Vitamin (DAILY MULTIVITAMIN PO)  Omega-3 Fatty Acids (FISH OIL PO)  propranolol (INDERAL) 20 MG tablet  Pseudoeph-Doxylamine-DM-APAP (NYQUIL PO)  Pyridoxine HCl (VITAMIN B6 PO)  sildenafil (VIAGRA) 100 MG tablet  triamcinolone (KENALOG) 0.1 % external cream  TYLENOL 325 MG OR TABS  UNABLE TO FIND      No Known Allergies  Past medical history, past surgical history, medications, and " allergies were reviewed with the patient. Additional pertinent items: None    Family History  Family History   Problem Relation Age of Onset     Cancer - colorectal Father      Prostate Cancer Father      Heart Disease Father         heart attack     Colon Cancer Father      Breast Cancer Sister      Diabetes No family hx of      C.A.D. No family hx of      Hypertension No family hx of      Cerebrovascular Disease No family hx of      Family history was reviewed with the patient. Additional pertinent items: None    Social History  Social History     Tobacco Use     Smoking status: Never Smoker     Smokeless tobacco: Never Used   Substance Use Topics     Alcohol use: Yes     Comment: moderate, 2 beers per day     Drug use: No      Social history was reviewed with the patient. Additional pertinent items: None    Review of Systems  A complete review of systems was performed with pertinent positives and negatives noted in the HPI, and all other systems negative.    Physical Exam   BP: (!) 143/95  Pulse: 94  Temp: 99  F (37.2  C)  Resp: 16  Weight: 97.5 kg (215 lb)  SpO2: 97 %  Physical Exam  Physical Exam   Constitutional: oriented to person, place, and time. appears well-developed and well-nourished.   HENT:   Head: Normocephalic and atraumatic. Dried blood at the nares bilaterally.  There is no bleeding currently.  No bleeding in the posterior pharynx.  No obvious vessels in the nares to cauterize bilaterally.  Septum without hematoma.  Neck: Normal range of motion.   Pulmonary/Chest: Effort normal. No respiratory distress.   Cardiac: No murmurs, rubs, gallops. RRR.  Abdominal: Abdomen soft, nontender, nondistended. No rebound tenderness.  MSK: Long bones without deformity or evidence of trauma  Neurological: alert and oriented to person, place, and time.   Skin: Skin is warm and dry.   Psychiatric:  normal mood and affect.  behavior is normal. Thought content normal.     ED Course      [facility]  [facility]    -Epistaxis Mgmt    Date/Time: 7/9/2020 6:02 AM  Performed by: Ovi Smith MD  Authorized by: Ovi Smith MD       ANESTHESIA (see MAR for exact dosages)     Anesthesia method:  Topical application    Topical anesthetic:  Epinephrine      PROCEDURE DETAILS     Treatment site:  L anterior    Treatment method:  Anterior pack    Treatment complexity:  Limited    Treatment episode: recurrence of recent bleed        POST PROCEDURE     Assessment:  Bleeding stopped  PROCEDURE   Patient Tolerance:  Patient tolerated the procedure well with no immediate complications              Results for orders placed or performed during the hospital encounter of 07/09/20   CBC with platelets differential     Status: None   Result Value Ref Range    WBC 8.2 4.0 - 11.0 10e9/L    RBC Count 4.63 4.4 - 5.9 10e12/L    Hemoglobin 13.7 13.3 - 17.7 g/dL    Hematocrit 41.2 40.0 - 53.0 %    MCV 89 78 - 100 fl    MCH 29.6 26.5 - 33.0 pg    MCHC 33.3 31.5 - 36.5 g/dL    RDW 13.2 10.0 - 15.0 %    Platelet Count 221 150 - 450 10e9/L    Diff Method Automated Method     % Neutrophils 58.6 %    % Lymphocytes 31.1 %    % Monocytes 6.3 %    % Eosinophils 1.5 %    % Basophils 0.7 %    % Immature Granulocytes 1.8 %    Nucleated RBCs 0 0 /100    Absolute Neutrophil 4.8 1.6 - 8.3 10e9/L    Absolute Lymphocytes 2.6 0.8 - 5.3 10e9/L    Absolute Monocytes 0.5 0.0 - 1.3 10e9/L    Absolute Eosinophils 0.1 0.0 - 0.7 10e9/L    Absolute Basophils 0.1 0.0 - 0.2 10e9/L    Abs Immature Granulocytes 0.2 0 - 0.4 10e9/L    Absolute Nucleated RBC 0.0           No results found for any visits on 07/09/20.  Medications - No data to display     Assessments & Plan (with Medical Decision Making)   MDM  Patient presented with epistaxis.  Patient did not have bleeding while here in emergency department.  There is no obvious site to cauterize.  Bleeding was controlled with phenylephrine and compression.  Discussed nasal spray in addition to  possibly adding humidity to the area at home.  Patient will follow-up with his physician.  He will return if worsening.  I do not feel packing is necessary at this point due to controlled bleed without difficulty.  He was observed for several hours without rebleeding.  Hemoglobin is stable.  No symptoms of anemia.    Addendum: Patient returned with ongoing epistaxis.  He is otherwise stable.  A anterior packing was successfully placed with good hemostasis.  Will prescribe antibiotics and have him follow-up with ENT in 2 to 3 days.  Patient agrees this plan he will return if worsening.  I have reviewed the nursing notes. I have reviewed the findings, diagnosis, plan and need for follow up with the patient.    New Prescriptions    AMOXICILLIN-CLAVULANATE (AUGMENTIN) 875-125 MG TABLET    Take 1 tablet by mouth 2 times daily for 5 days    PHENYLEPHRINE (RONY-SYNEPHRINE) 0.25 % NASAL SPRAY    Spray 1 spray into both nostrils every 4 hours as needed for congestion or other (Nose bleed)       Final diagnoses:   Epistaxis       --  Ovi Simth MD   Emergency Medicine   [facility] EMERGENCY DEPARTMENT  7/9/2020     Ovi Smith MD  07/09/20 0420       Ovi Smith MD  07/09/20 [id number]

## 2020-07-10 ENCOUNTER — HOSPITAL ENCOUNTER (EMERGENCY)
Facility: CLINIC | Age: 50
Discharge: HOME | End: 2020-07-11
Attending: EMERGENCY MEDICINE | Admitting: EMERGENCY MEDICINE
Payer: COMMERCIAL

## 2020-07-10 ENCOUNTER — OFFICE VISIT (OUTPATIENT)
Dept: FAMILY MEDICINE | Facility: CLINIC | Age: 50
End: 2020-07-10
Payer: COMMERCIAL

## 2020-07-10 VITALS
HEART RATE: 79 BPM | TEMPERATURE: 98.4 F | WEIGHT: 213 LBS | HEIGHT: 70 IN | SYSTOLIC BLOOD PRESSURE: 146 MMHG | OXYGEN SATURATION: 96 % | BODY MASS INDEX: 30.49 KG/M2 | DIASTOLIC BLOOD PRESSURE: 103 MMHG | RESPIRATION RATE: 12 BRPM

## 2020-07-10 DIAGNOSIS — R04.0 EPISTAXIS: ICD-10-CM

## 2020-07-10 DIAGNOSIS — K59.00 CONSTIPATION, UNSPECIFIED CONSTIPATION TYPE: ICD-10-CM

## 2020-07-10 DIAGNOSIS — R11.2 NON-INTRACTABLE VOMITING WITH NAUSEA, UNSPECIFIED VOMITING TYPE: ICD-10-CM

## 2020-07-10 DIAGNOSIS — R04.0 EPISTAXIS: Primary | ICD-10-CM

## 2020-07-10 PROCEDURE — 30901 CONTROL OF NOSEBLEED: CPT | Mod: LT | Performed by: EMERGENCY MEDICINE

## 2020-07-10 PROCEDURE — 99283 EMERGENCY DEPT VISIT LOW MDM: CPT | Mod: Z6 | Performed by: EMERGENCY MEDICINE

## 2020-07-10 PROCEDURE — 99284 EMERGENCY DEPT VISIT MOD MDM: CPT | Mod: 25 | Performed by: EMERGENCY MEDICINE

## 2020-07-10 PROCEDURE — 99214 OFFICE O/P EST MOD 30 MIN: CPT | Performed by: PHYSICIAN ASSISTANT

## 2020-07-10 PROCEDURE — 25000132 ZZH RX MED GY IP 250 OP 250 PS 637: Performed by: EMERGENCY MEDICINE

## 2020-07-10 RX ORDER — ONDANSETRON 4 MG/1
4 TABLET, ORALLY DISINTEGRATING ORAL EVERY 8 HOURS PRN
Qty: 10 TABLET | Refills: 0 | Status: SHIPPED | OUTPATIENT
Start: 2020-07-10 | End: 2021-04-02

## 2020-07-10 RX ORDER — ONDANSETRON 4 MG/1
4 TABLET, ORALLY DISINTEGRATING ORAL ONCE
Status: COMPLETED | OUTPATIENT
Start: 2020-07-10 | End: 2020-07-10

## 2020-07-10 RX ORDER — POLYETHYLENE GLYCOL 3350 17 G/17G
1 POWDER, FOR SOLUTION ORAL DAILY
Qty: 507 G | Refills: 0 | Status: ON HOLD | OUTPATIENT
Start: 2020-07-10 | End: 2021-07-19

## 2020-07-10 RX ADMIN — ONDANSETRON 4 MG: 4 TABLET, ORALLY DISINTEGRATING ORAL at 08:23

## 2020-07-10 RX ADMIN — PHENYLEPHRINE HYDROCHLORIDE 2 SPRAY: 0.25 SPRAY NASAL at 23:24

## 2020-07-10 ASSESSMENT — ENCOUNTER SYMPTOMS
FEVER: 0
ABDOMINAL PAIN: 0
NAUSEA: 1
COUGH: 0
CONSTIPATION: 1
VOMITING: 0
SHORTNESS OF BREATH: 0
LIGHT-HEADEDNESS: 0
NERVOUS/ANXIOUS: 0

## 2020-07-10 ASSESSMENT — PATIENT HEALTH QUESTIONNAIRE - PHQ9: SUM OF ALL RESPONSES TO PHQ QUESTIONS 1-9: 6

## 2020-07-10 ASSESSMENT — MIFFLIN-ST. JEOR: SCORE: 1829.47

## 2020-07-10 ASSESSMENT — PAIN SCALES - GENERAL: PAINLEVEL: SEVERE PAIN (7)

## 2020-07-10 NOTE — ED TRIAGE NOTES
Noose bleed started last night seen yesterday and treated with pressure device that stopped the bleeding. Patient states that about an hour ago patient was working at his computer when his nose bleed started again. Pressure device is still in place, no active dripping or bleeding around the pressure device at the time of assessment.

## 2020-07-10 NOTE — ED AVS SNAPSHOT
[facility], [place], Emergency Department  [address] [place] [address]  Phone:  [phone]                                    Yvon Dunlap   MRN: [id number]    Department:  [facility], Emergency Department   Date of Visit:  7/10/2020           After Visit Summary Signature Page    I have received my discharge instructions, and my questions have been answered. I have discussed any challenges I see with this plan with the nurse or doctor.    ..........................................................................................................................................  Patient/Patient Representative Signature      ..........................................................................................................................................  Patient Representative Print Name and Relationship to Patient    ..................................................               ................................................  Date                                   Time    ..........................................................................................................................................  Reviewed by Signature/Title    ...................................................              ..............................................  Date                                               Time          22EPIC Rev 08/18

## 2020-07-10 NOTE — DISCHARGE INSTRUCTIONS
Please make an appointment to follow up with [facility] (phone: [phone]) as scheduled.    Apply direct pressure to nose if bleeding reoccurs. Return to the ED if bleeding does not stop after holding pressure for 10 minutes or if bleeding is so heavy that it makes it difficult to breathe.

## 2020-07-10 NOTE — PATIENT INSTRUCTIONS
We have page the ear nose and throat doctors to request sooner follow-up as you have been to the ED 3 times and known to clinic for nosebleeds.  We will contact you once we hear back from them.  You have been prescribed Zofran to use for nausea.  MiraLAX has been prescribed for constipation.  Please discontinue use of Norco as soon as possible and only use this medication for severe pain.  For mild to moderate pain please use Tylenol.  Patient Education     Nosebleed (Adult)    Bleeding from the nose most commonly occurs because of injury or drying and cracking of the inner lining of the nose. Most nosebleeds are because of dry air or nose-picking. They can occur during a common cold or an allergy attack. They can also occur on a very hot day, or from dry air in the winter.  If the bleeding site is found, it may be cauterized. This means it is treated to cause a blood clot to form. This may be done with a chemical, heat, or electricity. If the bleeding continues after the site is cauterized, or if the site cannot be found, packing may be put in your nose. This is to apply pressure and stop the bleeding. The packing may be made of gauze or sponge. A small balloon catheter is sometimes used. These must be removed by your healthcare provider. Some types of packing dissolve on their own. If you are taking blood thinning (anticoagulant) medicine, you may have a blood test.  Home care    If packing was put in your nose, unless told otherwise, do not pull on it or try to remove it yourself. You will be given an appointment to have it removed. You may also have been given antibiotics to prevent a sinus infection. If so, finish all of the medicine.    Don't blow your nose for 12 hours after the bleeding stops. This will allow a strong blood clot to form. Don't pick your nose. This may restart bleeding.    Don't drink alcohol or hot liquids for the next 2 days. Alcohol or hot liquids in your mouth can dilate blood vessels in  your nose. This can cause bleeding to start again.    Don't take ibuprofen, naproxen, or medicines that contain aspirin. These thin the blood and may cause your nose to bleed. You may take acetaminophen for pain, unless another pain medicine was prescribed.    If the bleeding starts again, sit up and lean forward to prevent swallowing blood. Pinch your nose tightly on both sides, as shown above, for 10 to 15 minutes. Time yourself. Don t release the pressure on your nose until 10 minutes is up. If bleeding does not stop, continue to pinch your nose and call your healthcare provider or return to this facility.    If you have a cold, allergies, or dry nasal membranes, lubricate the nasal passages. Apply a small amount of petroleum jelly inside the nose with a cotton swab twice a day (morning and night).    Don't overheat your home. This can dry the air and make your condition worse.    Put a humidifier in the room where you sleep. This will add moisture to the air. Clean the humidifier as advised by the .    Use a saline nasal spray to keep nasal passages moist.    Don't pick your nose. Keep fingernails trimmed to decrease risk of bleeds.    Don't smoke.  Follow-up care  Follow up with your healthcare provider, or as advised. Nasal packing should be rechecked or removed within 2 to 3 days.  When to seek medical advice  Call your healthcare provider right away if any of these occur.    You have another nosebleed that you cannot control    Dizziness, weakness, or fainting    You become tired or confused    Fever of 100.4 F (38 C) or higher, or as directed by your healthcare provider    Headache    Sinus or facial pain    Shortness of breath or trouble breathing  Date Last Reviewed: 11/1/2017 2000-2019 [employer]. [address] [place] [address], [place] [address]. All rights reserved. This information is not intended as a substitute for professional medical care. Always follow your healthcare  professional's instructions.

## 2020-07-10 NOTE — ED PROVIDER NOTES
"    [facility] EMERGENCY DEPARTMENT ([facility] [place])    7/09/20        History     Chief Complaint   Patient presents with     Epistaxis     Pt came to ER last night for nose bleed, inflatable packing was placed, nose began bleeding again this evening     HPI  Yvon Dunlap is a 49 year old male with past medical history of recurrent epistaxis, seen twice yesterday for recurrent epistaxis and discharged with anterior nasal packing last night who presents to the Emergency Department with recurrent bleeding from the left nare.  The patient had packing placed here yesterday and was started on a course of Augmentin and instruction to follow-up with ENT in 2-3 days.  Patient states that he had a recurrence of bleeding an hour ago when he was working at his computer. He had about \"12 drops of blood\" after he had bent over leaning forward at computer. He squeezed his nose and came back to ED. Since arrival, the bleeding has stopped. Denies difficulty breathing or swallowing. Does not feel blood draining in back of throat. Has abdi. Scheduled with ENT on Monday and was told they may be able to get him in to be seen tomorrow.       I have reviewed the Medications, Allergies, Past Medical and Surgical History, and Social History in the [employer] system.  PAST MEDICAL HISTORY:   Past Medical History:   Diagnosis Date     ALLERGIC RHINITIS NOS 5/8/2006     Depressive disorder Sometime in the 1990s, I think     Depressive disorder, not elsewhere classified      Family history of colon cancer      Other and unspecified hyperlipidemia        PAST SURGICAL HISTORY:   Past Surgical History:   Procedure Laterality Date     COLONOSCOPY  May 2016     HERNIA REPAIR  2008 or 2009     NO HISTORY OF SURGERY         Past medical history, past surgical history, medications, and allergies were reviewed with the patient. Additional pertinent items: None    FAMILY HISTORY:   Family History   Problem Relation Age of Onset     Cancer - colorectal " Father      Prostate Cancer Father      Heart Disease Father         heart attack     Colon Cancer Father      Breast Cancer Sister      Diabetes No family hx of      C.A.D. No family hx of      Hypertension No family hx of      Cerebrovascular Disease No family hx of        SOCIAL HISTORY:   Social History     Tobacco Use     Smoking status: Never Smoker     Smokeless tobacco: Never Used   Substance Use Topics     Alcohol use: Yes     Comment: moderate, 2 beers per day     Social history was reviewed with the patient. Additional pertinent items: None      Discharge Medication List as of 7/9/2020 11:01 PM      CONTINUE these medications which have NOT CHANGED    Details   amoxicillin-clavulanate (AUGMENTIN) 875-125 MG tablet Take 1 tablet by mouth 2 times daily, Disp-10 tablet,R-0, Local Print      HYDROcodone-acetaminophen (NORCO) 5-325 MG tablet Take 1-2 tablets by mouth every 4 hours as needed for moderate to severe pain, Disp-10 tablet,R-0, E-Prescribe      propranolol (INDERAL) 20 MG tablet Take 1 to 2 tablets, 30 minutes before anxiety inducing event, Disp-30 tablet, R-1, E-Prescribe      buPROPion (WELLBUTRIN XL) 300 MG 24 hr tablet TAKE 1 TABLET (300 MG) BY MOUTH EVERY MORNING, Disp-90 tablet,R-1, E-Prescribe      CLARITIN 10 MG OR TABS 1 TABLET DAILY prn for allergies, Historical      IBUPROFEN 600 MG OR TABS 1 tab prn for pain, Disp-0, R-0, Historical      Multiple Vitamin (DAILY MULTIVITAMIN PO) Take  by mouth daily., Historical      Omega-3 Fatty Acids (FISH OIL PO) Take  by mouth daily., Historical      phenylephrine (RONY-SYNEPHRINE) 0.25 % nasal spray Spray 1 spray into both nostrils every 4 hours as needed for congestion or other (Nose bleed), Disp-15 mL,R-0, Local Print      Pseudoeph-Doxylamine-DM-APAP (NYQUIL PO) Historical      Pyridoxine HCl (VITAMIN B6 PO) Take by mouth daily, Historical      sildenafil (VIAGRA) 100 MG tablet TAKE 1 TABLET BY MOUTH DAILY AS NEEDED. TAKE 30 MINS-4 HRS PRIOR TO  INTERCOURSE, Disp-12 tablet, R-3, E-Prescribe      triamcinolone (KENALOG) 0.1 % external cream Apply topically 2 times daily as needed for irritation Apply sparingly to affected area three times daily as neededDisp-45 g, [id number]      TYLENOL 325 MG OR TABS ONE TO TWO TABLETS EVERY 4 TO 6 HOURS AS NEEDED FOR PAIN, Disp-100, R-0, Historical      UNABLE TO FIND MEDICATION NAME: Zinc, Historical              No Known Allergies     Review of Systems   HENT: Positive for nosebleeds. Negative for postnasal drip and trouble swallowing.    Respiratory: Negative for cough and shortness of breath.    All other systems reviewed and are negative.    A complete review of systems was performed with pertinent positives and negatives noted in the HPI, and all other systems negative.    Physical Exam   BP: (!) 170/114  Pulse: 87  Temp: 97.9  F (36.6  C)  SpO2: 98 %      Physical Exam  Vitals signs and nursing note reviewed.   Constitutional:       General: He is not in acute distress.     Appearance: Normal appearance. He is well-developed. He is not ill-appearing or diaphoretic.   HENT:      Head: Normocephalic and atraumatic.      Nose: Nose normal.      Comments: Nasal packing in left nare. Dried blood on packing. No active bleeding     Mouth/Throat:      Mouth: Mucous membranes are moist.      Pharynx: Oropharynx is clear. No oropharyngeal exudate or posterior oropharyngeal erythema.      Comments: No blood in posterior oropharynx  Eyes:      General: No scleral icterus.     Conjunctiva/sclera: Conjunctivae normal.   Neck:      Musculoskeletal: Normal range of motion and neck supple.   Cardiovascular:      Rate and Rhythm: Normal rate.   Pulmonary:      Effort: Pulmonary effort is normal. No respiratory distress.      Breath sounds: No stridor.   Abdominal:      General: There is no distension.   Musculoskeletal: Normal range of motion.         General: No deformity.   Skin:     General: Skin is warm and dry.       Coloration: Skin is not jaundiced or pale.      Findings: No erythema or rash.   Neurological:      General: No focal deficit present.      Mental Status: He is alert and oriented to person, place, and time.   Psychiatric:         Mood and Affect: Mood is anxious.         Behavior: Behavior normal.         Thought Content: Thought content normal.         ED Course        Procedures                           Results for orders placed or performed during the hospital encounter of 07/09/20 (from the past 24 hour(s))   -Epistaxis Mgmt    Narrative    Ovi Smith MD     7/9/2020  6:36 AM  [facility]    -Epistaxis Mgmt    Date/Time: 7/9/2020 6:02 AM  Performed by: Ovi Smith MD  Authorized by: Ovi Smith MD       ANESTHESIA (see MAR for exact dosages)     Anesthesia method:  Topical application    Topical anesthetic:  Epinephrine      PROCEDURE DETAILS     Treatment site:  L anterior    Treatment method:  Anterior pack    Treatment complexity:  Limited    Treatment episode: recurrence of recent bleed        POST PROCEDURE     Assessment:  Bleeding stopped  PROCEDURE   Patient Tolerance:  Patient tolerated the procedure well with no immediate   complications     CBC with platelets differential   Result Value Ref Range    WBC 8.2 4.0 - 11.0 10e9/L    RBC Count 4.63 4.4 - 5.9 10e12/L    Hemoglobin 13.7 13.3 - 17.7 g/dL    Hematocrit 41.2 40.0 - 53.0 %    MCV 89 78 - 100 fl    MCH 29.6 26.5 - 33.0 pg    MCHC 33.3 31.5 - 36.5 g/dL    RDW 13.2 10.0 - 15.0 %    Platelet Count 221 150 - 450 10e9/L    Diff Method Automated Method     % Neutrophils 58.6 %    % Lymphocytes 31.1 %    % Monocytes 6.3 %    % Eosinophils 1.5 %    % Basophils 0.7 %    % Immature Granulocytes 1.8 %    Nucleated RBCs 0 0 /100    Absolute Neutrophil 4.8 1.6 - 8.3 10e9/L    Absolute Lymphocytes 2.6 0.8 - 5.3 10e9/L    Absolute Monocytes 0.5 0.0 - 1.3 10e9/L    Absolute Eosinophils 0.1  0.0 - 0.7 10e9/L    Absolute Basophils 0.1 0.0 - 0.2 10e9/L    Abs Immature Granulocytes 0.2 0 - 0.4 10e9/L    Absolute Nucleated RBC 0.0      Medications - No data to display          Assessments & Plan (with Medical Decision Making)   Yvon Dunlap is a 49 year old male with past medical history of recurrent epistaxis, seen twice yesterday for recurrent epistaxis and discharged with anterior nasal packing last night who presents to the Emergency Department with recurrent bleeding from the left nare.      Ddx: resolved anterior epistaxis, recurrent epistaxis    Patient NAD with left anterior nasal packing in place. No posterior oropharyngeal bleeding noted. No active anterior bleeding visualized. Patient very anxious. Discussed monitoring in ED and discharge with scheduled ENT f/u if no rebleeding. Patient observed in ED. at the time of discharge, patient now reporting recurrent bleeding.  On examination patient has been rubbing his left nare with a piece of gauze and has some dried blood on the gauze.  There is no evidence of active bleeding.  Patient advised not to retraumatized the nasal mucosa by rubbing it.  There is trace clear moisture around the left nare which appears consistent with condensation from exhalation. Reassurance provided. Again, reminded patient not to pick at his packing as this will cause recurrent bleeding. Advised him to hold direct pressure on the nare if bleeding reoccurs at home. Instructed on how to apply pressure and that he should continue holding pressure without interruption for 10 minutes if rebleeding occurs. Return to ED if bleeding does not stop after attempting to hold pressure. Maintain follow up with ENT. Return precautions provided.       I have reviewed the nursing notes.    I have reviewed the findings, diagnosis, plan and need for follow up with the patient.    Discharge Medication List as of 7/9/2020 11:01 PM          Final diagnoses:   Epistaxis       7/9/2020    [facility], [place], EMERGENCY DEPARTMENT     Rizwana Flor MD  07/09/20 [id number]

## 2020-07-10 NOTE — PROGRESS NOTES
Subjective     Yvon Dunlap is a 49 year old male who presents to clinic today for the following health issues:    HPI   ED/UC Followup:    Facility:  [facility]  Date of visit: 7/9/2020  Reason for visit: Epistaxis  Current Status: pt is still having bleeding and pain     Patient notes that nose bleed started from left side on the 4th of July. Patient applied pressure and bleeding resolved until two nights ago when it started again. Both times, it started without trauma. Patient has been to the ED twice. The second time nose was packed and patient started Amoxicillin as well as hydrocodone. After packing bleeding started again from the left nare which resolved quickly. Patient returned to the ED and was monitored. This morning patient noticed a small amount of bleeding into his throat.     Additionally, patient has been using prescribed Norco for discomfort.  He has no experiencing nausea as well as constipation.  Patient did not eat with Norco dose this morning.    Patient Active Problem List   Diagnosis     Allergic rhinitis     HYPERLIPIDEMIA LDL GOAL <160     Erectile dysfunction     Family history of colon cancer     Family history of prostate cancer     Dysthymic disorder     Epistaxis     Past Surgical History:   Procedure Laterality Date     COLONOSCOPY  May 2016     HERNIA REPAIR  2008 or 2009     NO HISTORY OF SURGERY         Social History     Tobacco Use     Smoking status: Never Smoker     Smokeless tobacco: Never Used   Substance Use Topics     Alcohol use: Yes     Comment: moderate, 2 beers per day     Family History   Problem Relation Age of Onset     Cancer - colorectal Father      Prostate Cancer Father      Heart Disease Father         heart attack     Colon Cancer Father      Breast Cancer Sister      Diabetes No family hx of      C.A.D. No family hx of      Hypertension No family hx of      Cerebrovascular Disease No family hx of          Current Outpatient Medications    Medication Sig Dispense Refill     amoxicillin-clavulanate (AUGMENTIN) 875-125 MG tablet Take 1 tablet by mouth 2 times daily 10 tablet 0     CLARITIN 10 MG OR TABS 1 TABLET DAILY prn for allergies       HYDROcodone-acetaminophen (NORCO) 5-325 MG tablet Take 1-2 tablets by mouth every 4 hours as needed for moderate to severe pain 10 tablet 0     IBUPROFEN 600 MG OR TABS 1 tab prn for pain 0 0     Multiple Vitamin (DAILY MULTIVITAMIN PO) Take  by mouth daily.       Omega-3 Fatty Acids (FISH OIL PO) Take  by mouth daily.       ondansetron (ZOFRAN-ODT) 4 MG ODT tab Take 1 tablet (4 mg) by mouth every 8 hours as needed for nausea 10 tablet 0     polyethylene glycol (MIRALAX) 17 GM/SCOOP powder Take 17 g (1 capful) by mouth daily 507 g 0     propranolol (INDERAL) 20 MG tablet Take 1 to 2 tablets, 30 minutes before anxiety inducing event 30 tablet 1     Pseudoeph-Doxylamine-DM-APAP (NYQUIL PO)        Pyridoxine HCl (VITAMIN B6 PO) Take by mouth daily       triamcinolone (KENALOG) 0.1 % external cream Apply topically 2 times daily as needed for irritation Apply sparingly to affected area three times daily as needed 45 g 1     TYLENOL 325 MG OR TABS ONE TO TWO TABLETS EVERY 4 TO 6 HOURS AS NEEDED FOR PAIN 100 0     UNABLE TO FIND MEDICATION NAME: Zinc       buPROPion (WELLBUTRIN XL) 300 MG 24 hr tablet TAKE 1 TABLET (300 MG) BY MOUTH EVERY MORNING (Patient not taking: Reported on 7/9/2020) 90 tablet 1     phenylephrine (RONY-SYNEPHRINE) 0.25 % nasal spray Spray 1 spray into both nostrils every 4 hours as needed for congestion or other (Nose bleed) (Patient not taking: Reported on 7/9/2020) 15 mL 0     sildenafil (VIAGRA) 100 MG tablet TAKE 1 TABLET BY MOUTH DAILY AS NEEDED. TAKE 30 MINS-4 HRS PRIOR TO INTERCOURSE (Patient not taking: Reported on 7/9/2020) 12 tablet 3     No Known Allergies    Reviewed and updated as needed this visit by Provider  Tobacco  Allergies  Meds  Problems  Med Hx  Surg Hx  Fam Hx      "    Review of Systems   Constitutional: Negative for fever.   HENT: Positive for nosebleeds. Negative for congestion.    Respiratory: Negative for cough and shortness of breath.    Cardiovascular: Negative for chest pain.   Gastrointestinal: Positive for constipation and nausea. Negative for abdominal pain and vomiting.   Skin: Negative for rash.   Neurological: Negative for light-headedness.   Psychiatric/Behavioral: The patient is not nervous/anxious.             Objective    BP (!) 146/103 (BP Location: Left arm, Patient Position: Chair, Cuff Size: Adult Large)   Pulse 79   Temp 98.4  F (36.9  C) (Tympanic)   Resp 12   Ht 1.765 m (5' 9.5\")   Wt 96.6 kg (213 lb)   SpO2 96%   BMI 31.00 kg/m    Body mass index is 31 kg/m .  Physical Exam  Vitals signs and nursing note reviewed.   Constitutional:       General: He is not in acute distress.     Appearance: Normal appearance.   HENT:      Head: Normocephalic and atraumatic.      Nose:      Comments: Left anterior epistaxis packing in place and inflated.  No bloody discharge.       Mouth/Throat:      Mouth: Mucous membranes are moist.      Pharynx: Oropharynx is clear.      Comments: No notable posterior epistaxis  Eyes:      Extraocular Movements: Extraocular movements intact.      Pupils: Pupils are equal, round, and reactive to light.   Neck:      Musculoskeletal: Normal range of motion.   Cardiovascular:      Rate and Rhythm: Normal rate and regular rhythm.      Heart sounds: Normal heart sounds.   Pulmonary:      Effort: Pulmonary effort is normal.      Breath sounds: Normal breath sounds.   Musculoskeletal: Normal range of motion.   Skin:     General: Skin is warm and dry.   Neurological:      General: No focal deficit present.      Mental Status: He is alert.   Psychiatric:         Mood and Affect: Mood normal.         Behavior: Behavior normal.            Diagnostic Test Results:  Labs reviewed in Epic        Assessment & Plan     1. Epistaxis  Upon exam, " no obvious signs of epistaxis at this time.  As patient has had 3 ED visits and one clinic visit related to epistaxis, even with packing in place. Given this, ENT paged.      09:10am: Discussed epistaxis management with ENT, Dr. Saba, who recommends patient present to the [facility] ED if bleeding reoccurs.  At that time an ENT fellow should be consulted for further evaluation/cauterization of bleeding.  Patient contacted with these recommendations.  If bleeding does not recur, patient to follow-up in clinic on Monday as scheduled.    2. Non-intractable vomiting with nausea, unspecified vomiting type  Patient's nausea may be due to posterior epistaxis or taking Norco without food.  Patient treated in clinic with ODT Zofran successfully.  Patient prescribed Zofran to use at home.  - ondansetron (ZOFRAN-ODT) ODT tab 4 mg  - ondansetron (ZOFRAN-ODT) 4 MG ODT tab; Take 1 tablet (4 mg) by mouth every 8 hours as needed for nausea  Dispense: 10 tablet; Refill: 0    3. Constipation, unspecified constipation type  Constipation likely related to Norco.  Discussed hydration and prescribed MiraLAX.  Recommended decreasing/discontinuing Norco ASAP.  - polyethylene glycol (MIRALAX) 17 GM/SCOOP powder; Take 17 g (1 capful) by mouth daily  Dispense: 507 g; Refill: 0    See Patient Instructions    Return in about 1 week (around 7/17/2020), or if symptoms worsen or fail to improve.    Sisi Chandra PA-C  [facility]

## 2020-07-11 VITALS
OXYGEN SATURATION: 98 % | DIASTOLIC BLOOD PRESSURE: 113 MMHG | RESPIRATION RATE: 16 BRPM | HEART RATE: 72 BPM | TEMPERATURE: 98 F | SYSTOLIC BLOOD PRESSURE: 160 MMHG

## 2020-07-11 LAB
ANION GAP SERPL CALCULATED.3IONS-SCNC: 6 MMOL/L (ref 3–14)
APTT PPP: 26 SEC (ref 22–37)
BASOPHILS # BLD AUTO: 0.1 10E9/L (ref 0–0.2)
BASOPHILS NFR BLD AUTO: 0.6 %
BUN SERPL-MCNC: 12 MG/DL (ref 7–30)
CALCIUM SERPL-MCNC: 8.8 MG/DL (ref 8.5–10.1)
CHLORIDE SERPL-SCNC: 107 MMOL/L (ref 94–109)
CO2 SERPL-SCNC: 25 MMOL/L (ref 20–32)
CREAT SERPL-MCNC: 0.93 MG/DL (ref 0.66–1.25)
DIFFERENTIAL METHOD BLD: NORMAL
EOSINOPHIL # BLD AUTO: 0.1 10E9/L (ref 0–0.7)
EOSINOPHIL NFR BLD AUTO: 0.8 %
ERYTHROCYTE [DISTWIDTH] IN BLOOD BY AUTOMATED COUNT: 13.1 % (ref 10–15)
GFR SERPL CREATININE-BSD FRML MDRD: >90 ML/MIN/{1.73_M2}
GLUCOSE SERPL-MCNC: 127 MG/DL (ref 70–99)
HCT VFR BLD AUTO: 46 % (ref 40–53)
HGB BLD-MCNC: 14.8 G/DL (ref 13.3–17.7)
IMM GRANULOCYTES # BLD: 0.2 10E9/L (ref 0–0.4)
IMM GRANULOCYTES NFR BLD: 1.4 %
INR PPP: 1.04 (ref 0.86–1.14)
LYMPHOCYTES # BLD AUTO: 2.2 10E9/L (ref 0.8–5.3)
LYMPHOCYTES NFR BLD AUTO: 20 %
MCH RBC QN AUTO: 28.8 PG (ref 26.5–33)
MCHC RBC AUTO-ENTMCNC: 32.2 G/DL (ref 31.5–36.5)
MCV RBC AUTO: 90 FL (ref 78–100)
MONOCYTES # BLD AUTO: 0.8 10E9/L (ref 0–1.3)
MONOCYTES NFR BLD AUTO: 7.7 %
NEUTROPHILS # BLD AUTO: 7.6 10E9/L (ref 1.6–8.3)
NEUTROPHILS NFR BLD AUTO: 69.5 %
NRBC # BLD AUTO: 0 10*3/UL
NRBC BLD AUTO-RTO: 0 /100
PLATELET # BLD AUTO: 243 10E9/L (ref 150–450)
POTASSIUM SERPL-SCNC: 3.8 MMOL/L (ref 3.4–5.3)
RBC # BLD AUTO: 5.13 10E12/L (ref 4.4–5.9)
SODIUM SERPL-SCNC: 137 MMOL/L (ref 133–144)
WBC # BLD AUTO: 10.9 10E9/L (ref 4–11)

## 2020-07-11 PROCEDURE — 85610 PROTHROMBIN TIME: CPT | Performed by: EMERGENCY MEDICINE

## 2020-07-11 PROCEDURE — 31231 NASAL ENDOSCOPY DX: CPT | Mod: 50,59 | Performed by: EMERGENCY MEDICINE

## 2020-07-11 PROCEDURE — 85730 THROMBOPLASTIN TIME PARTIAL: CPT | Performed by: EMERGENCY MEDICINE

## 2020-07-11 PROCEDURE — 80048 BASIC METABOLIC PNL TOTAL CA: CPT | Performed by: EMERGENCY MEDICINE

## 2020-07-11 PROCEDURE — 85025 COMPLETE CBC W/AUTO DIFF WBC: CPT | Performed by: EMERGENCY MEDICINE

## 2020-07-11 NOTE — ED PROVIDER NOTES
"    [facility] [place] EMERGENCY DEPARTMENT ([facility])     July 10, 2020  History     Chief Complaint   Patient presents with     Epistaxis     recurrent, hx of cauterization, was here 2 days ago for the same thing, took hydrocontin around 2130,      HPI  Yvon Dunlap is a 49 year old male with a medical history significant for recurrent epistaxis, dysthymic disorder, ED, depression, HLD, and family history of colon and prostate cancer who presents the ED today complaining of a nosebleed.  Patient reports right sided nosebleed that started earlier today.  Patient has been in and out of the emergency department 3 times in the last 2 days for left-sided nosebleed.  He had anterior packing placed 2 days ago for bleeding.  He was in the emergency department again after that for blood down his throat.  Today, he saw his primary care provider who spoke with ENT who recommended he come in for evaluation for possible cauterization.  Prior to coming to the emergency department, he developed right-sided bleeding and blood going down the back of his throat again.  Patient is not on any anticoagulation and denies trauma.  No dizziness or lightheadedness.  He does have a mild headache since having the packing placed.    Patient was recently seen here in the ED for epistaxis on 7/9/2020 7/9/2020 assessment and plan for epistaxis:  \"Yvon Dunlap is a 49 year old male with past medical history of recurrent epistaxis, seen twice yesterday for recurrent epistaxis and discharged with anterior nasal packing last night who presents to the Emergency Department with recurrent bleeding from the left nare.       Ddx: resolved anterior epistaxis, recurrent epistaxis     Patient NAD with left anterior nasal packing in place. No posterior oropharyngeal bleeding noted. No active anterior bleeding visualized. Patient very anxious. Discussed monitoring in ED and discharge with scheduled ENT f/u if no rebleeding. Patient observed in " "ED. at the time of discharge, patient now reporting recurrent bleeding.  On examination patient has been rubbing his left nare with a piece of gauze and has some dried blood on the gauze.  There is no evidence of active bleeding.  Patient advised not to retraumatized the nasal mucosa by rubbing it.  There is trace clear moisture around the left nare which appears consistent with condensation from exhalation. Reassurance provided. Again, reminded patient not to pick at his packing as this will cause recurrent bleeding. Advised him to hold direct pressure on the nare if bleeding reoccurs at home. Instructed on how to apply pressure and that he should continue holding pressure without interruption for 10 minutes if rebleeding occurs. Return to ED if bleeding does not stop after attempting to hold pressure. Maintain follow up with ENT. Return precautions provided.\"    I have reviewed the Medications, Allergies, Past Medical and Surgical History, and Social History in the [facility] system.    PAST MEDICAL HISTORY:   Past Medical History:   Diagnosis Date     ALLERGIC RHINITIS NOS 5/8/2006     Depressive disorder Sometime in the 1990s, I think     Depressive disorder, not elsewhere classified      Family history of colon cancer      Other and unspecified hyperlipidemia        PAST SURGICAL HISTORY:   Past Surgical History:   Procedure Laterality Date     COLONOSCOPY  May 2016     HERNIA REPAIR  2008 or 2009     NO HISTORY OF SURGERY         Past medical history, past surgical history, medications, and allergies were reviewed with the patient. Additional pertinent items: None    FAMILY HISTORY:   Family History   Problem Relation Age of Onset     Cancer - colorectal Father      Prostate Cancer Father      Heart Disease Father         heart attack     Colon Cancer Father      Breast Cancer Sister      Diabetes No family hx of      C.A.D. No family hx of      Hypertension No family hx of      Cerebrovascular Disease No family hx " of        SOCIAL HISTORY:   Social History     Tobacco Use     Smoking status: Never Smoker     Smokeless tobacco: Never Used   Substance Use Topics     Alcohol use: Yes     Comment: moderate, 2 beers per day     Social history was reviewed with the patient. Additional pertinent items: None      Patient's Medications   New Prescriptions    No medications on file   Previous Medications    AMOXICILLIN-CLAVULANATE (AUGMENTIN) 875-125 MG TABLET    Take 1 tablet by mouth 2 times daily    BUPROPION (WELLBUTRIN XL) 300 MG 24 HR TABLET    TAKE 1 TABLET (300 MG) BY MOUTH EVERY MORNING    CLARITIN 10 MG OR TABS    1 TABLET DAILY prn for allergies    HYDROCODONE-ACETAMINOPHEN (NORCO) 5-325 MG TABLET    Take 1-2 tablets by mouth every 4 hours as needed for moderate to severe pain    IBUPROFEN 600 MG OR TABS    1 tab prn for pain    MULTIPLE VITAMIN (DAILY MULTIVITAMIN PO)    Take  by mouth daily.    OMEGA-3 FATTY ACIDS (FISH OIL PO)    Take  by mouth daily.    ONDANSETRON (ZOFRAN-ODT) 4 MG ODT TAB    Take 1 tablet (4 mg) by mouth every 8 hours as needed for nausea    PHENYLEPHRINE (RONY-SYNEPHRINE) 0.25 % NASAL SPRAY    Spray 1 spray into both nostrils every 4 hours as needed for congestion or other (Nose bleed)    POLYETHYLENE GLYCOL (MIRALAX) 17 GM/SCOOP POWDER    Take 17 g (1 capful) by mouth daily    PROPRANOLOL (INDERAL) 20 MG TABLET    Take 1 to 2 tablets, 30 minutes before anxiety inducing event    PSEUDOEPH-DOXYLAMINE-DM-APAP (NYQUIL PO)        PYRIDOXINE HCL (VITAMIN B6 PO)    Take by mouth daily    SILDENAFIL (VIAGRA) 100 MG TABLET    TAKE 1 TABLET BY MOUTH DAILY AS NEEDED. TAKE 30 MINS-4 HRS PRIOR TO INTERCOURSE    TRIAMCINOLONE (KENALOG) 0.1 % EXTERNAL CREAM    Apply topically 2 times daily as needed for irritation Apply sparingly to affected area three times daily as needed    TYLENOL 325 MG OR TABS    ONE TO TWO TABLETS EVERY 4 TO 6 HOURS AS NEEDED FOR PAIN    UNABLE TO FIND    MEDICATION NAME: Zinc   Modified  Medications    No medications on file   Discontinued Medications    No medications on file        No Known Allergies     Review of Systems  A complete review of systems was performed with pertinent positives and negatives noted in the HPI, and all other systems negative.    Physical Exam   BP: (!) 146/99  Pulse: 77  Temp: 98  F (36.7  C)  Resp: 16  SpO2: 97 %      Physical Exam  Vitals signs and nursing note reviewed.   Constitutional:       General: He is in acute distress (appears uncomfortable).      Appearance: Normal appearance. He is not diaphoretic.   HENT:      Head: Atraumatic.      Nose:      Comments: Left naris with anterior packing in place. Some blood-tinged rhinorrhea noted. Right naris with a small amount of blood noted in the inferior posterior nose. No sites of active bleeding seen.     Mouth/Throat:      Comments: Some blood in posterior oropharynx.  Eyes:      General: No scleral icterus.     Pupils: Pupils are equal, round, and reactive to light.   Cardiovascular:      Rate and Rhythm: Normal rate and regular rhythm.      Heart sounds: Normal heart sounds.   Pulmonary:      Effort: Pulmonary effort is normal. No respiratory distress.      Breath sounds: Normal breath sounds.   Abdominal:      General: Bowel sounds are normal.      Palpations: Abdomen is soft.      Tenderness: There is no abdominal tenderness.   Musculoskeletal:         General: No tenderness.   Skin:     General: Skin is warm.      Findings: No rash.   Neurological:      General: No focal deficit present.      Mental Status: He is alert and oriented to person, place, and time.   Psychiatric:         Mood and Affect: Mood normal.         Behavior: Behavior normal.         ED Course       Procedures      No results found for this or any previous visit (from the past 24 hour(s)).  Medications   phenylephrine (RONY-SYNEPHRINE) 0.25 % spray 2 spray (2 sprays Nasal Given 7/10/20 4546)          Assessments & Plan (with Medical Decision  Making)   Patient was seen and examined.  No evidence of active bleeding that would be amenable to cautery at this time.  I did speak with Dr. Saba who is on-call for ENT and is aware of this patient.  She had recommended transferring the patient to [place] for ENT evaluation.  Patient will likely at minimum need bilateral nasal packing but would likely benefit from a scope to better delineate the source of bleeding.  Patient did have more bleeding from the right nostril while in the emergency department and Stevie-Synephrine spray was administered.  Transferred to [place] ED for ENT consult.  Patient will likely be able to discharge after their evaluation.    I have reviewed the nursing notes.    I have reviewed the findings, diagnosis, plan and need for follow up with the patient.    New Prescriptions    No medications on file       Final diagnoses:   Epistaxis       7/10/2020   [facility], [place], EMERGENCY DEPARTMENT     Michaela Day,   07/10/20 9277

## 2020-07-11 NOTE — DISCHARGE INSTRUCTIONS
TODAY'S VISIT:  You were seen today for epistaxis/nose bleed.  - The ENT team recommend that you keep your packing in place until your follow-up appointment on Monday (a few days from now), that you continue the previously prescribed antibiotics, and that tomorrow you start using general nasal saline spray to keep your nose moist as they discussed.  - You should discuss all imaging/radiology tests and laboratory tests that were performed during this visit with your usual providers to ensure you continue to improve and do not need any further evaluation, testing or management.   - Please call your Primary Care team to discuss and arrange a follow-up appointment.     FOLLOW-UP:  Please make an appointment to follow up with:  - Your ENT clinic as you have scheduled for this coming Monday in a couple days.   - You can also call to discuss with our [facility] (phone: [phone])   - If you do not have a primary care provider, you can be seen in follow-up and establish care with one of our providers by calling of the the clinics below:  --- [facility] (phone: [phone])  --- [facility] (phone: [phone])   - Have your provider review the results from today's visit with you again to make sure no further follow-up or additional testing is needed based on those results.     PRESCRIPTIONS / MEDICATIONS:  - Please continue the prescribed antibiotic until completed or told otherwise by a medical provider.  - Notify a medical provider of any abnormal reactions to this medication.   - Tomorrow please start using a nasal saline spray as recommended by the ENT team.     OTHER INSTRUCTIONS:  - Do your best to stay hydrated.    RETURN TO THE EMERGENCY DEPARTMENT  Return to the Emergency Department immediately for any new or worsening symptoms or any concerns.     Remember that you can always come back to the Emergency Department if you are not able to see your  regular doctor in the amount of time listed above, if you get any new symptoms, or if there is anything that worries you.

## 2020-07-11 NOTE — CONSULTS
Otolaryngology Consult Note  July 11, 2020    We were asked to see this patient in consultation by Michaela Day MD     CC: Epistaxis     HPI: Yvon Dunlap is a 49 year old male with a past medical history of depression and anxiety who presents with a nose bleed. The patient notes this first began on the 4th of July and resolved with pressure. It subsequently restarted and since than the patient has had two trips to the ED with the last one resulting in a rapid rhino being placed on the left side. He was started on amoxicillin and discharged. He noted that the first evening and into the next day there was no bleeding but after he bent down to pick something up the bleeding started again. He presented to the [facility] ED and was evaluated for epistaxis. He was transferred to the [place] ED for further ENT management. Upon arriving to the ED his bleeding became more brisk. His rapid rhino was removed and several clots were cleared. He was instructed to apply afrin and hold pressure. Upon interviewers arrival he was holding firm pressure over the soft part of his nose and protecting his airway well without distress. The patient notes that he does not have a long hx of epistaxis but did have similar symptoms 6 years ago that resulted in packing and eventually cauterization of silver nitrate. The bleeding seems to always occur on the left nostril but this evening he noted a small amount of blood coming from the right nostril. He denies trauma to the nose, fevers, or chills. He is otherwise well but anxious about his nose.      Past Medical History:   Diagnosis Date     ALLERGIC RHINITIS NOS 5/8/2006     Depressive disorder Sometime in the 1990s, I think     Depressive disorder, not elsewhere classified      Family history of colon cancer      Other and unspecified hyperlipidemia        Past Surgical History:   Procedure Laterality Date     COLONOSCOPY  May 2016     HERNIA REPAIR  2008 or 2009     NO HISTORY OF  SURGERY         Current Outpatient Medications   Medication Sig Dispense Refill     amoxicillin-clavulanate (AUGMENTIN) 875-125 MG tablet Take 1 tablet by mouth 2 times daily 10 tablet 0     HYDROcodone-acetaminophen (NORCO) 5-325 MG tablet Take 1-2 tablets by mouth every 4 hours as needed for moderate to severe pain 10 tablet 0     ondansetron (ZOFRAN-ODT) 4 MG ODT tab Take 1 tablet (4 mg) by mouth every 8 hours as needed for nausea 10 tablet 0     polyethylene glycol (MIRALAX) 17 GM/SCOOP powder Take 17 g (1 capful) by mouth daily 507 g 0     TYLENOL 325 MG OR TABS ONE TO TWO TABLETS EVERY 4 TO 6 HOURS AS NEEDED FOR PAIN 100 0     buPROPion (WELLBUTRIN XL) 300 MG 24 hr tablet TAKE 1 TABLET (300 MG) BY MOUTH EVERY MORNING (Patient not taking: Reported on 7/9/2020) 90 tablet 1     CLARITIN 10 MG OR TABS 1 TABLET DAILY prn for allergies       IBUPROFEN 600 MG OR TABS 1 tab prn for pain 0 0     Multiple Vitamin (DAILY MULTIVITAMIN PO) Take  by mouth daily.       Omega-3 Fatty Acids (FISH OIL PO) Take  by mouth daily.       phenylephrine (RONY-SYNEPHRINE) 0.25 % nasal spray Spray 1 spray into both nostrils every 4 hours as needed for congestion or other (Nose bleed) (Patient not taking: Reported on 7/9/2020) 15 mL 0     propranolol (INDERAL) 20 MG tablet Take 1 to 2 tablets, 30 minutes before anxiety inducing event 30 tablet 1     Pseudoeph-Doxylamine-DM-APAP (NYQUIL PO)        Pyridoxine HCl (VITAMIN B6 PO) Take by mouth daily       sildenafil (VIAGRA) 100 MG tablet TAKE 1 TABLET BY MOUTH DAILY AS NEEDED. 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   Social Needs     Financial resource strain: Not on file     Food insecurity     Worry: Not on file     Inability: Not on file     Transportation needs     Medical: Not on file     Non-medical: Not on file   Tobacco Use     Smoking status: Never Smoker     Smokeless tobacco: Never Used   Substance and Sexual Activity     Alcohol use: Yes     Comment: moderate, 2 beers per day     Drug use: No     Sexual activity: Yes     Partners: Female     Birth control/protection: Pill   Lifestyle     Physical activity     Days per week: Not on file     Minutes per session: Not on file     Stress: Not on file   Relationships     Social connections     Talks on phone: Not on file     Gets together: Not on file     Attends Nondenominational service: Not on file     Active member of club or organization: Not on file     Attends meetings of clubs or organizations: Not on file     Relationship status: Not on file     Intimate partner violence     Fear of current or ex partner: Not on file     Emotionally abused: Not on file     Physically abused: Not on file     Forced sexual activity: Not on file   Other Topics Concern     Parent/sibling w/ CABG, MI or angioplasty before 65F 55M? No   Social History Narrative     Not on file       Family History   Problem Relation Age of Onset     Cancer - colorectal Father      Prostate Cancer Father      Heart Disease Father         heart attack     Colon Cancer Father      Breast Cancer Sister      Diabetes No family hx of      C.A.D. No family hx of      Hypertension No family hx of      Cerebrovascular Disease No family hx of        ROS: 12 point review of systems is negative unless noted in HPI.    PHYSICAL EXAM:  General: laying in bed, no acute distress, holding pressure over nose   BP (!) 160/113   Pulse  72   Temp 98  F (36.7  C) (Oral)   Resp 16   SpO2 98%   HEAD: normocephalic, atraumatic  Face: symmetrical, CN VII intact bilaterally (HB 1), no swelling, edema, or erythema. Sensation V1-V3 intact and equal bilaterally. There is dried blood along the lips and cheek of the patient   Eyes: EOMI without spontaneous or gaze evoked nystagmus, PERRL, clear sclera  Nose: Old blood around bilateral nostrils. There is some old clot within the left nasal passage. Septum is deviated to the right and bilateral nostrils are narrowed. No active drainage   Mouth: moist, no ulcers, no jaw or tooth tenderness, tongue midline and symmetric  Oropharynx: uvula midline, no oropharyngeal erythema, there is some old blood and clot coming down from the nasopharynx, no active bleeding   Neck: no LAD, trachea midline  Neuro: cranial nerves 2-12 grossly intact    Nasal ENDOSCOPY:  Due to epistaxis a nasal endoscopy was warranted. After verbal consent the scope was passed through the right nostril. There was very scant only blood along the middle turbinate attachment point and the lateral nasal wall. There was no active bleeding. The nasopharynx could not be examined due to poor patient tolerance and narrow nasal passages. The scope was removed and inserted into the left nostril. There was old clot along the floor of the nasal cavity which was suctioned away. There was narrowing posteriorly with some old clot that was difficulty to clear. The nostril was flushed with saline and no active bleeding was observed. Again the nasopharynx could not be examined secondary to poor patient tolerance and narrowness of nasal passage. The scope was removed     Nasal Packing:  Although the patient was not actively bleeding his hx of recurrent epistaxis over the past week was concerning for rebleeding. Therefore a decision was made to pack the left nostril. Floseal with thrombin was mixed and half was injected into the left nostril as posteriorly as  possible. A merocel wrapped in surgicel was then inserted into the left nostril. The patient had pain but tolerated the procedure. The remaining floseal was injected around the merocel to fill the remainder of the nasal cavity. The merocel was instilled with nasal saline and the string was taped to the patients cheek. Upon completion there was no anterior drainage. The oropharynx was clear. There was some old clot handing down from the nasopharynx but no active bleeding. This completed our procedure     ROUTINE IP LABS (Last four results)  BMP  Recent Labs   Lab 07/11/20  0023      POTASSIUM 3.8   CHLORIDE 107   PALMER 8.8   CO2 25   BUN 12   CR 0.93   *     CBC  Recent Labs   Lab 07/11/20 0023 07/09/20  0218   WBC 10.9 8.2   RBC 5.13 4.63   HGB 14.8 13.7   HCT 46.0 41.2   MCV 90 89   MCH 28.8 29.6   MCHC 32.2 33.3   RDW 13.1 13.2    221     INR  Recent Labs   Lab 07/11/20 0023   INR 1.04       IMAGING:  No results found for this or any previous visit.      Assessment and Plan  Yvon Dunlap is a 49 year old male with a past medical history of depression and anxiety who presents with epistaxis. This was mostly controlled with pressure and afrin. Due to the patients hx of rebleeding over the past week a decision was made to pack with merocel and floseal. The patient tolerated the procedure and there was no evidence of bleeding upon completion. The patient was already taking amoxicillin from his last packing placement so he was instructed to continue that while the packing is in place. He currently has an appointment to see and outside ENT on Monday. He was instructed to keep this appointment and that his packing could be removed at that time. He should use afrin if there is persistent bleeding and start a nasal saline spray tomorrow. He should also practice good nasal hygiene. He was further instructed to cough/sneez with mouth open, avoid nose blowing or straining as well as any strenuous  activity or activity that increases the pressure in his head.     - Antibiotics while packing is in place - already on amoxicillin so he can take that   - Merocel until follow up with outside ENT where it can be removed   - Afrin 4-5 sprays followed by firm continuous pressure over the soft part of the nose for 20 minutes consecutively. This can be repeated up to 3 times. If there is persistent bleeding or brisk bleeding please call or present to the ED   - Begin nasal saline spray TID tomorrow to instill into packing   - No follow up at the [facility] needed as long as he is seeing his other ENT provider next week. If not he will need to see us for follow up and packing removal   - Avoid nose blowing, or other strenuous activities. Cough and sneeze with mouth open.     This patient will be staffed with Dr. Saba.    Gerry Nathan MD PGY-3  Otolaryngology-Head & Neck Surgery.

## 2020-07-11 NOTE — ED PROVIDER NOTES
SIGN-OUT:  - Assumed care of this patient from Dr. Day  - Pending at shift change: ENT consult for recurrent epistaxis  - Tentative plan from original EM Physician: Notify ENT upon arrival, dispo as per ENT    UPDATES / REASSESSMENT:  - Reassessment: Upon arrival to , still having active bleeding around device in left nare. Device removed, had pt blow nose, used neosynephrine and reapplied pressure w/ some improvement but some mild ongoing bleeding.   - ENT consult: Pt seen, evaluated and managed by ENT. They were able to obtain hemostasis w/ merocel packing. They are available, should any issues arrise otherwise pt will F/U w/ usual ENT provider on Monday. THey had a thorough discussion of recommendations with patient (safety, packing, Abx, epistaxis management, strict return instructions, etc.). See their note for full details.   - No other acute issues or interventions necessary while still in the emergency department.    DISPOSITION:  - Patient discharged home.   - Has F/U appointment on Monday w/ his usual ENT, which he will keep  - Continuing previously prescribed antibiotics until cleared by ENT  - Packing to remain in place until Monday (he will return with any issues for such and will do saline moisturizing as recommended by ENT)  - Reviewed importance of close F/U, strict return/safety instructions, packing safety instructions, rx, etc. He is not going to drive home and lives with others and so will not be alone in case any issues arise. He expressed understanding and agreement with this plan. All questions answered to the best of our ability at this time.                Rae Funk MD  07/12/20 1381

## 2020-07-11 NOTE — ED TRIAGE NOTES
Patient said that the MD that he sees for the recurrent nose bleeds said that he should follow up with ENT for possible cauterization. See note from 7/10/2020 at 0700 under the plan for that note

## 2020-07-13 ENCOUNTER — OFFICE VISIT (OUTPATIENT)
Dept: OTOLARYNGOLOGY | Facility: CLINIC | Age: 50
End: 2020-07-13
Payer: COMMERCIAL

## 2020-07-13 VITALS
HEART RATE: 94 BPM | TEMPERATURE: 98.2 F | SYSTOLIC BLOOD PRESSURE: 113 MMHG | OXYGEN SATURATION: 95 % | DIASTOLIC BLOOD PRESSURE: 80 MMHG

## 2020-07-13 DIAGNOSIS — R04.0 EPISTAXIS: Primary | ICD-10-CM

## 2020-07-13 PROCEDURE — 99203 OFFICE O/P NEW LOW 30 MIN: CPT | Performed by: OTOLARYNGOLOGY

## 2020-07-13 NOTE — LETTER
7/13/2020         RE: Yvon Dunlap  [address] [address]        Dear Colleague,    Thank you for referring your patient, Yvon Dunlap, to the [facility]. Please see a copy of my visit note below.    History of Present Illness - Yvon Dunlap is a 49 year old male being seen for the first time as follow up from the ER after epistaxis and nasal packing.  The event happened on 7/8/2020.    He denies any history of nasal surgery, or any major nasal or facial trauma.  There was not a preceding illness, change in nasal airway, change in sense of smell or vision, no new onset headache before this happened.    Past Medical History -   Patient Active Problem List   Diagnosis     Allergic rhinitis     HYPERLIPIDEMIA LDL GOAL <160     Erectile dysfunction     Family history of colon cancer     Family history of prostate cancer     Dysthymic disorder     Epistaxis       Current Medications -   Current Outpatient Medications:      amoxicillin-clavulanate (AUGMENTIN) 875-125 MG tablet, Take 1 tablet by mouth 2 times daily, Disp: 10 tablet, Rfl: 0     buPROPion (WELLBUTRIN XL) 300 MG 24 hr tablet, TAKE 1 TABLET (300 MG) BY MOUTH EVERY MORNING (Patient not taking: Reported on 7/9/2020), Disp: 90 tablet, Rfl: 1     CLARITIN 10 MG OR TABS, 1 TABLET DAILY prn for allergies, Disp: , Rfl:      HYDROcodone-acetaminophen (NORCO) 5-325 MG tablet, Take 1-2 tablets by mouth every 4 hours as needed for moderate to severe pain, Disp: 10 tablet, Rfl: 0     IBUPROFEN 600 MG OR TABS, 1 tab prn for pain, Disp: 0, Rfl: 0     Multiple Vitamin (DAILY MULTIVITAMIN PO), Take  by mouth daily., Disp: , Rfl:      Omega-3 Fatty Acids (FISH OIL PO), Take  by mouth daily., Disp: , Rfl:      ondansetron (ZOFRAN-ODT) 4 MG ODT tab, Take 1 tablet (4 mg) by mouth every 8 hours as needed for nausea, Disp: 10 tablet, Rfl: 0     phenylephrine (RONY-SYNEPHRINE) 0.25 % nasal spray, Spray 1 spray into both  nostrils every 4 hours as needed for congestion or other (Nose bleed) (Patient not taking: Reported on 7/9/2020), Disp: 15 mL, Rfl: 0     polyethylene glycol (MIRALAX) 17 GM/SCOOP powder, Take 17 g (1 capful) by mouth daily, Disp: 507 g, Rfl: 0     propranolol (INDERAL) 20 MG tablet, Take 1 to 2 tablets, 30 minutes before anxiety inducing event, Disp: 30 tablet, Rfl: 1     Pseudoeph-Doxylamine-DM-APAP (NYQUIL PO), , Disp: , Rfl:      Pyridoxine HCl (VITAMIN B6 PO), Take by mouth daily, Disp: , Rfl:      sildenafil (VIAGRA) 100 MG tablet, TAKE 1 TABLET BY MOUTH DAILY AS NEEDED. TAKE 30 MINS-4 HRS PRIOR TO INTERCOURSE (Patient not taking: Reported on 7/9/2020), Disp: 12 tablet, Rfl: 3     triamcinolone (KENALOG) 0.1 % external cream, Apply topically 2 times daily as needed for irritation Apply sparingly to affected area three times daily as needed, Disp: 45 g, Rfl: 1     TYLENOL 325 MG OR TABS, ONE TO TWO TABLETS EVERY 4 TO 6 HOURS AS NEEDED FOR PAIN, Disp: 100, Rfl: 0     UNABLE TO FIND, MEDICATION NAME: Zinc, Disp: , Rfl:     Allergies - No Known Allergies    Social History -   Social History     Socioeconomic History     Marital status:      Spouse name: roshni randall     Number of children: 2     Years of education: 16     Highest education level: Not on file   Occupational History     Occupation: home      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   Social Needs     Financial resource strain: Not on file     Food insecurity     Worry: Not on file     Inability: Not on file     Transportation needs     Medical: Not on file     Non-medical: Not on file   Tobacco Use     Smoking status: Never Smoker     Smokeless tobacco: Never Used   Substance and Sexual Activity     Alcohol use: Yes     Comment: moderate, 2 beers per day     Drug use: No     Sexual activity: Yes     Partners: Female     Birth control/protection: Pill   Lifestyle     Physical activity     Days per week: Not on file     Minutes per session: Not on  file     Stress: Not on file   Relationships     Social connections     Talks on phone: Not on file     Gets together: Not on file     Attends [employer] service: Not on file     Active member of club or organization: Not on file     Attends meetings of clubs or organizations: Not on file     Relationship status: Not on file     Intimate partner violence     Fear of current or ex partner: Not on file     Emotionally abused: Not on file     Physically abused: Not on file     Forced sexual activity: Not on file   Other Topics Concern     Parent/sibling w/ CABG, MI or angioplasty before 65F 55M? No   Social History Narrative     Not on file       Family History -   Family History   Problem Relation Age of Onset     Cancer - colorectal Father      Prostate Cancer Father      Heart Disease Father         heart attack     Colon Cancer Father      Breast Cancer Sister      Diabetes No family hx of      C.A.D. No family hx of      Hypertension No family hx of      Cerebrovascular Disease No family hx of        Review of Systems - As per HPI and PMHx, otherwise 10+ system review of the head and neck, and general constitution is negative.    Physical Exam  /80   Pulse 94   Temp 98.2  F (36.8  C)   SpO2 95%     General - The patient is well nourished and well developed, and appears to have good nutritional status.  Alert and oriented to person and place, answers questions and cooperates with examination appropriately.   Head and Face - Normocephalic and atraumatic, with no gross asymmetry noted of the contour of the facial features.  The facial nerve is intact, with strong symmetric movements.  Voice and Breathing - The patient was breathing comfortably without the use of accessory muscles. There was no wheezing, stridor, or stertor.  The patients voice was clear and strong, and had appropriate pitch and quality.  Ears - The tympanic membranes are normal in appearance, bony landmarks are intact.  No retraction,  perforation, or masses.  No fluid or purulence was seen in the external canal or the middle ear. No evidence of infection of the middle ear or external canal, cerumen was normal in appearance.  Eyes - Extraocular movements intact, and the pupils were reactive to light.  Sclera were not icteric or injected, conjunctiva were pink and moist.  Mouth - Examination of the oral cavity showed pink, healthy oral mucosa. No lesions or ulcerations noted.  The tongue was mobile and midline, and the dentition were in good condition.    Throat - The walls of the oropharynx were smooth, pink, moist, symmetric, and had no lesions or ulcerations.  The tonsillar pillars and soft palate were symmetric.  The uvula was midline on elevation.    Neck - Normal midline excursion of the laryngotracheal complex during swallowing.  Full range of motion on passive movement.  Palpation of the occipital, submental, submandibular, internal jugular chain, and supraclavicular nodes did not demonstrate any abnormal lymph nodes or masses.  The carotid pulse was palpable bilaterally.  Palpation of the thyroid was soft and smooth, with no nodules or goiter appreciated.  The trachea was mobile and midline.  Nose - External contour is symmetric, no gross deflection or scars.  After removing the micki packing from the LEFT side, I inspected the nose with speculum.  There was no active bleeding.  Some excoriated surfaces from the scrape of the packing going, no exposed vessels. A small piece of Surgical was placed over the excoriated areas on the LEFT septum.      A/P - Yvon Dunlap is a 49 year old male  (R04.0) Epistaxis  (primary encounter diagnosis)    The packing has been removed today, and it appears it did the job.    Avoid any nasal manipulation for one week, no blowing or picking, keep head above the level of the heart.    Again, thank you for allowing me to participate in the care of your patient.        Sincerely,        Jan Moreno  MD Miranda

## 2020-07-13 NOTE — PROGRESS NOTES
History of Present Illness - Yvon Dunlap is a 49 year old male being seen for the first time as follow up from the ER after epistaxis and nasal packing.  The event happened on 7/8/2020.    He denies any history of nasal surgery, or any major nasal or facial trauma.  There was not a preceding illness, change in nasal airway, change in sense of smell or vision, no new onset headache before this happened.    Past Medical History -   Patient Active Problem List   Diagnosis     Allergic rhinitis     HYPERLIPIDEMIA LDL GOAL <160     Erectile dysfunction     Family history of colon cancer     Family history of prostate cancer     Dysthymic disorder     Epistaxis       Current Medications -   Current Outpatient Medications:      amoxicillin-clavulanate (AUGMENTIN) 875-125 MG tablet, Take 1 tablet by mouth 2 times daily, Disp: 10 tablet, Rfl: 0     buPROPion (WELLBUTRIN XL) 300 MG 24 hr tablet, TAKE 1 TABLET (300 MG) BY MOUTH EVERY MORNING (Patient not taking: Reported on 7/9/2020), Disp: 90 tablet, Rfl: 1     CLARITIN 10 MG OR TABS, 1 TABLET DAILY prn for allergies, Disp: , Rfl:      HYDROcodone-acetaminophen (NORCO) 5-325 MG tablet, Take 1-2 tablets by mouth every 4 hours as needed for moderate to severe pain, Disp: 10 tablet, Rfl: 0     IBUPROFEN 600 MG OR TABS, 1 tab prn for pain, Disp: 0, Rfl: 0     Multiple Vitamin (DAILY MULTIVITAMIN PO), Take  by mouth daily., Disp: , Rfl:      Omega-3 Fatty Acids (FISH OIL PO), Take  by mouth daily., Disp: , Rfl:      ondansetron (ZOFRAN-ODT) 4 MG ODT tab, Take 1 tablet (4 mg) by mouth every 8 hours as needed for nausea, Disp: 10 tablet, Rfl: 0     phenylephrine (RONY-SYNEPHRINE) 0.25 % nasal spray, Spray 1 spray into both nostrils every 4 hours as needed for congestion or other (Nose bleed) (Patient not taking: Reported on 7/9/2020), Disp: 15 mL, Rfl: 0     polyethylene glycol (MIRALAX) 17 GM/SCOOP powder, Take 17 g (1 capful) by mouth daily, Disp: 507 g, Rfl: 0      propranolol (INDERAL) 20 MG tablet, Take 1 to 2 tablets, 30 minutes before anxiety inducing event, Disp: 30 tablet, Rfl: 1     Pseudoeph-Doxylamine-DM-APAP (NYQUIL PO), , Disp: , Rfl:      Pyridoxine HCl (VITAMIN B6 PO), Take by mouth daily, Disp: , Rfl:      sildenafil (VIAGRA) 100 MG tablet, TAKE 1 TABLET BY MOUTH DAILY AS NEEDED. TAKE 30 MINS-4 HRS PRIOR TO INTERCOURSE (Patient not taking: Reported on 7/9/2020), Disp: 12 tablet, Rfl: 3     triamcinolone (KENALOG) 0.1 % external cream, Apply topically 2 times daily as needed for irritation Apply sparingly to affected area three times daily as needed, Disp: 45 g, Rfl: 1     TYLENOL 325 MG OR TABS, ONE TO TWO TABLETS EVERY 4 TO 6 HOURS AS NEEDED FOR PAIN, Disp: 100, Rfl: 0     UNABLE TO FIND, MEDICATION NAME: Zinc, Disp: , Rfl:     Allergies - No Known Allergies    Social History -   Social History     Socioeconomic History     Marital status:      Spouse name: roshni randall     Number of children: 2     Years of education: 16     Highest education level: Not on file   Occupational History     Occupation: home      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   Social Needs     Financial resource strain: Not on file     Food insecurity     Worry: Not on file     Inability: Not on file     Transportation needs     Medical: Not on file     Non-medical: Not on file   Tobacco Use     Smoking status: Never Smoker     Smokeless tobacco: Never Used   Substance and Sexual Activity     Alcohol use: Yes     Comment: moderate, 2 beers per day     Drug use: No     Sexual activity: Yes     Partners: Female     Birth control/protection: Pill   Lifestyle     Physical activity     Days per week: Not on file     Minutes per session: Not on file     Stress: Not on file   Relationships     Social connections     Talks on phone: Not on file     Gets together: Not on file     Attends Anabaptism service: Not on file     Active member of club or organization: Not on file     Attends meetings of  clubs or organizations: Not on file     Relationship status: Not on file     Intimate partner violence     Fear of current or ex partner: Not on file     Emotionally abused: Not on file     Physically abused: Not on file     Forced sexual activity: Not on file   Other Topics Concern     Parent/sibling w/ CABG, MI or angioplasty before 65F 55M? No   Social History Narrative     Not on file       Family History -   Family History   Problem Relation Age of Onset     Cancer - colorectal Father      Prostate Cancer Father      Heart Disease Father         heart attack     Colon Cancer Father      Breast Cancer Sister      Diabetes No family hx of      C.A.D. No family hx of      Hypertension No family hx of      Cerebrovascular Disease No family hx of        Review of Systems - As per HPI and PMHx, otherwise 10+ system review of the head and neck, and general constitution is negative.    Physical Exam  /80   Pulse 94   Temp 98.2  F (36.8  C)   SpO2 95%     General - The patient is well nourished and well developed, and appears to have good nutritional status.  Alert and oriented to person and place, answers questions and cooperates with examination appropriately.   Head and Face - Normocephalic and atraumatic, with no gross asymmetry noted of the contour of the facial features.  The facial nerve is intact, with strong symmetric movements.  Voice and Breathing - The patient was breathing comfortably without the use of accessory muscles. There was no wheezing, stridor, or stertor.  The patients voice was clear and strong, and had appropriate pitch and quality.  Ears - The tympanic membranes are normal in appearance, bony landmarks are intact.  No retraction, perforation, or masses.  No fluid or purulence was seen in the external canal or the middle ear. No evidence of infection of the middle ear or external canal, cerumen was normal in appearance.  Eyes - Extraocular movements intact, and the pupils were reactive  to light.  Sclera were not icteric or injected, conjunctiva were pink and moist.  Mouth - Examination of the oral cavity showed pink, healthy oral mucosa. No lesions or ulcerations noted.  The tongue was mobile and midline, and the dentition were in good condition.    Throat - The walls of the oropharynx were smooth, pink, moist, symmetric, and had no lesions or ulcerations.  The tonsillar pillars and soft palate were symmetric.  The uvula was midline on elevation.    Neck - Normal midline excursion of the laryngotracheal complex during swallowing.  Full range of motion on passive movement.  Palpation of the occipital, submental, submandibular, internal jugular chain, and supraclavicular nodes did not demonstrate any abnormal lymph nodes or masses.  The carotid pulse was palpable bilaterally.  Palpation of the thyroid was soft and smooth, with no nodules or goiter appreciated.  The trachea was mobile and midline.  Nose - External contour is symmetric, no gross deflection or scars.  After removing the micki packing from the LEFT side, I inspected the nose with speculum.  There was no active bleeding.  Some excoriated surfaces from the scrape of the packing going, no exposed vessels. A small piece of Surgical was placed over the excoriated areas on the LEFT septum.      A/P - Yvon Dunlap is a 49 year old male  (R04.0) Epistaxis  (primary encounter diagnosis)    The packing has been removed today, and it appears it did the job.    Avoid any nasal manipulation for one week, no blowing or picking, keep head above the level of the heart.

## 2020-07-27 ENCOUNTER — VIRTUAL VISIT (OUTPATIENT)
Dept: FAMILY MEDICINE | Facility: CLINIC | Age: 50
End: 2020-07-27
Payer: COMMERCIAL

## 2020-07-27 DIAGNOSIS — R04.0 EPISTAXIS: ICD-10-CM

## 2020-07-27 DIAGNOSIS — F40.298 SPECIFIC PHOBIA: Primary | ICD-10-CM

## 2020-07-27 DIAGNOSIS — F34.1 DYSTHYMIC DISORDER: ICD-10-CM

## 2020-07-27 DIAGNOSIS — M79.673 HEEL PAIN, UNSPECIFIED LATERALITY: ICD-10-CM

## 2020-07-27 PROCEDURE — 99214 OFFICE O/P EST MOD 30 MIN: CPT | Mod: 95 | Performed by: PHYSICIAN ASSISTANT

## 2020-07-27 RX ORDER — PROPRANOLOL HYDROCHLORIDE 20 MG/1
TABLET ORAL
Qty: 30 TABLET | Refills: 5 | Status: SHIPPED | OUTPATIENT
Start: 2020-07-27 | End: 2020-08-13

## 2020-07-27 RX ORDER — BUPROPION HYDROCHLORIDE 150 MG/1
150 TABLET ORAL EVERY MORNING
Qty: 90 TABLET | Refills: 3 | Status: SHIPPED | OUTPATIENT
Start: 2020-07-27

## 2020-07-27 RX ORDER — BUPROPION HYDROCHLORIDE 300 MG/1
300 TABLET ORAL EVERY MORNING
Qty: 90 TABLET | Refills: 3 | Status: SHIPPED | OUTPATIENT
Start: 2020-07-27 | End: 2021-04-06

## 2020-07-27 NOTE — PROGRESS NOTES
"Yvon Dunlap is a 49 year old male who is being evaluated via a billable video visit.      The patient has been notified of following:     \"This video visit will be conducted via a call between you and your physician/provider. We have found that certain health care needs can be provided without the need for an in-person physical exam.  This service lets us provide the care you need with a video conversation.  If a prescription is necessary we can send it directly to your pharmacy.  If lab work is needed we can place an order for that and you can then stop by our lab to have the test done at a later time.    Video visits are billed at different rates depending on your insurance coverage.  Please reach out to your insurance provider with any questions.    If during the course of the call the physician/provider feels a video visit is not appropriate, you will not be charged for this service.\"    Patient has given verbal consent for Video visit? Yes  How would you like to obtain your AVS? MyChart  If you are dropped from the video visit, the video invite should be resent to: Send to e-mail at: [email or web address]  Will anyone else be joining your video visit? No    Subjective     Yvon Dunlap is a 49 year old male who presents today via video visit for the following health issues:    HPI    Depression and Anxiety Follow-Up    How are you doing with your depression since your last visit? Worsened     How are you doing with your anxiety since your last visit?  Worsened     Are you having other symptoms that might be associated with depression or anxiety? No    Have you had a significant life event? No     Do you have any concerns with your use of alcohol or other drugs? No    Social History     Tobacco Use     Smoking status: Never Smoker     Smokeless tobacco: Never Used   Substance Use Topics     Alcohol use: Yes     Comment: moderate, 2 beers per day     Drug use: No     PHQ 6/15/2018 3/21/2019 " 7/10/2020   PHQ-9 Total Score 4 9 6   Q9: Thoughts of better off dead/self-harm past 2 weeks Not at all Several days Not at all     SOWMYA-7 SCORE 9/9/2013   Total Score 1       Suicide Assessment Five-step Evaluation and Treatment (SAFE-T)      How many servings of fruits and vegetables do you eat daily?  2-3    On average, how many sweetened beverages do you drink each day (Examples: soda, juice, sweet tea, etc.  Do NOT count diet or artificially sweetened beverages)?   2    How many days per week do you exercise enough to make your heart beat faster? 3 or less    How many minutes a day do you exercise enough to make your heart beat faster? 9 or less    How many days per week do you miss taking your medication? Frequently miss, patient says with the bupropion patient just has not been taking it and would like to get back on it.     Patient says heel pain has resolved on its own.      Video Start Time: 940    Patient Active Problem List   Diagnosis     Allergic rhinitis     HYPERLIPIDEMIA LDL GOAL <160     Erectile dysfunction     Family history of colon cancer     Family history of prostate cancer     Dysthymic disorder     Epistaxis     Past Surgical History:   Procedure Laterality Date     COLONOSCOPY  May 2016     HERNIA REPAIR  2008 or 2009     NO HISTORY OF SURGERY         Social History     Tobacco Use     Smoking status: Never Smoker     Smokeless tobacco: Never Used   Substance Use Topics     Alcohol use: Yes     Comment: moderate, 2 beers per day     Family History   Problem Relation Age of Onset     Cancer - colorectal Father      Prostate Cancer Father      Heart Disease Father         heart attack     Colon Cancer Father      Breast Cancer Sister      Diabetes No family hx of      C.A.D. No family hx of      Hypertension No family hx of      Cerebrovascular Disease No family hx of            Reviewed and updated as needed this visit by Provider         Review of Systems   Constitutional, HEENT,  "cardiovascular, pulmonary, gi and gu systems are negative, except as otherwise noted.      Objective             Physical Exam     GENERAL: Healthy, alert and no distress  Patient was not able to connect his video on his end.       Diagnostic Test Results:  Labs reviewed in Epic        Assessment & Plan     (F40.298) Specific phobia  (primary encounter diagnosis)  Comment: Refills   Plan: propranolol (INDERAL) 20 MG tablet          (F34.1) Dysthymic disorder  Comment:   Plan: buPROPion (WELLBUTRIN XL) 300 MG 24 hr tablet,         buPROPion (WELLBUTRIN XL) 150 MG 24 hr tablet        Refills. Stable. Anxiety is a bit worse. He wants to monitor his anxiety for now.    (M79.673) Heel pain, unspecified laterality  Comment:   Plan: Resolved. Wants to monitor    (R04.0) Epistaxis  Comment: Resolved. He wants to monitor  Plan:      BMI:   Estimated body mass index is 31 kg/m  as calculated from the following:    Height as of 7/10/20: 1.765 m (5' 9.5\").    Weight as of 7/10/20: 96.6 kg (213 lb).        No follow-ups on file.    PIERRE HARDWICK PA-C  [facility]      Video-Visit Details    Type of service:  Video Visit (With only the verbal portion working, video would not connect through [employer].)     Video End Time: 1005    Originating Location (pt. Location): Home    Distant Location (provider location):  [facility]     Platform used for Video Visit: [employer]    No follow-ups on file.       PIERRE HARDWICK PA-C        "

## 2020-08-13 DIAGNOSIS — F40.298 SPECIFIC PHOBIA: ICD-10-CM

## 2020-08-13 RX ORDER — PROPRANOLOL HYDROCHLORIDE 20 MG/1
TABLET ORAL
Qty: 90 TABLET | Refills: 1 | Status: SHIPPED | OUTPATIENT
Start: 2020-08-13 | End: 2020-10-08

## 2020-10-07 DIAGNOSIS — F40.298 SPECIFIC PHOBIA: ICD-10-CM

## 2020-10-08 RX ORDER — PROPRANOLOL HYDROCHLORIDE 20 MG/1
TABLET ORAL
Qty: 90 TABLET | Refills: 0 | Status: SHIPPED | OUTPATIENT
Start: 2020-10-08 | End: 2020-11-24

## 2020-10-08 NOTE — TELEPHONE ENCOUNTER
"Requested Prescriptions   Pending Prescriptions Disp Refills     propranolol (INDERAL) 20 MG tablet [Pharmacy Med Name: PROPRANOLOL 20 MG TABLET] 90 tablet 1     Sig: TAKE 1 TO 2 TABLETS, 30 MINUTES BEFORE ANXIETY INDUCING EVENT       Beta-Blockers Protocol Passed - 10/7/2020  7:34 AM        Passed - Blood pressure under 140/90 in past 12 months     BP Readings from Last 3 Encounters:   07/13/20 113/80   07/11/20 (!) 160/113   07/10/20 (!) 146/103                 Passed - Patient is age 6 or older        Passed - Recent (12 mo) or future (30 days) visit within the authorizing provider's specialty     Patient has had an office visit with the authorizing provider or a provider within the authorizing providers department within the previous 12 mos or has a future within next 30 days. See \"Patient Info\" tab in inbasket, or \"Choose Columns\" in Meds & Orders section of the refill encounter.              Passed - Medication is active on med list               Prescription approved per [facility] Refill Protocol.      Marcella Tinajero RN    "

## 2020-11-16 ENCOUNTER — MYC REFILL (OUTPATIENT)
Dept: FAMILY MEDICINE | Facility: CLINIC | Age: 50
End: 2020-11-16

## 2020-11-16 DIAGNOSIS — N52.9 ERECTILE DYSFUNCTION, UNSPECIFIED ERECTILE DYSFUNCTION TYPE: ICD-10-CM

## 2020-11-17 RX ORDER — SILDENAFIL 100 MG/1
100 TABLET, FILM COATED ORAL DAILY PRN
Qty: 12 TABLET | Refills: 3 | Status: SHIPPED | OUTPATIENT
Start: 2020-11-17

## 2020-12-17 ENCOUNTER — OFFICE VISIT (OUTPATIENT)
Dept: FAMILY MEDICINE | Facility: CLINIC | Age: 50
End: 2020-12-17
Payer: COMMERCIAL

## 2020-12-17 ENCOUNTER — ANCILLARY PROCEDURE (OUTPATIENT)
Dept: GENERAL RADIOLOGY | Facility: CLINIC | Age: 50
End: 2020-12-17
Attending: FAMILY MEDICINE
Payer: COMMERCIAL

## 2020-12-17 VITALS
HEART RATE: 78 BPM | DIASTOLIC BLOOD PRESSURE: 82 MMHG | SYSTOLIC BLOOD PRESSURE: 118 MMHG | BODY MASS INDEX: 33.01 KG/M2 | WEIGHT: 226.8 LBS | TEMPERATURE: 97.9 F

## 2020-12-17 DIAGNOSIS — M25.511 ACUTE PAIN OF RIGHT SHOULDER: Primary | ICD-10-CM

## 2020-12-17 DIAGNOSIS — R20.2 NUMBNESS AND TINGLING OF RIGHT ARM: ICD-10-CM

## 2020-12-17 DIAGNOSIS — R20.0 NUMBNESS AND TINGLING OF RIGHT ARM: ICD-10-CM

## 2020-12-17 DIAGNOSIS — M25.511 ACUTE PAIN OF RIGHT SHOULDER: ICD-10-CM

## 2020-12-17 PROCEDURE — 72040 X-RAY EXAM NECK SPINE 2-3 VW: CPT | Mod: FY | Performed by: RADIOLOGY

## 2020-12-17 PROCEDURE — 73030 X-RAY EXAM OF SHOULDER: CPT | Mod: RT | Performed by: RADIOLOGY

## 2020-12-17 PROCEDURE — 99213 OFFICE O/P EST LOW 20 MIN: CPT | Performed by: FAMILY MEDICINE

## 2020-12-17 ASSESSMENT — PATIENT HEALTH QUESTIONNAIRE - PHQ9: SUM OF ALL RESPONSES TO PHQ QUESTIONS 1-9: 7

## 2020-12-17 NOTE — PROGRESS NOTES
Subjective     Yvon Dunlap is a 50 year old male who presents to clinic today for the following health issues:    HPI         Musculoskeletal problem/pain  Onset/Duration: a few weeks  Description  Location: shoulder - right  Joint Swelling: no  Redness: no  Pain: YES  Warmth: no  Intensity:  Moderate to severe  Progression of Symptoms:  worsening  Accompanying signs and symptoms:   Fevers: no  Numbness/tingling/weakness: YES- radiates to hand, there is tingling in hand currently  History  Trauma to the area: YES- possibly pulled muscle? Pt mentioned he sleeps on his right side so he's always putting pressure on it  Recent illness:  no  Previous similar problem: no  Previous evaluation:  no  Precipitating or alleviating factors:  Aggravating factors include: lifting and raising hand above shoulder level, moving arm behind  Therapies tried and outcome: acetaminophen, not effective as time goes on    Patient describes a dull ache in the right shoulder and sharp stabbing pain with certain movements.  No specific injuries.  Pain radiates down his arm into his hand and he does report numbness and tingling in his hand.  Pain is worst in the mornings.      Review of Systems   Constitutional, HEENT, cardiovascular, pulmonary, gi and gu systems are negative, except as otherwise noted.      Objective    /82 (BP Location: Right arm, Patient Position: Chair, Cuff Size: Adult Large)   Pulse 78   Temp 97.9  F (36.6  C) (Oral)   Wt 102.9 kg (226 lb 12.8 oz)   BMI 33.01 kg/m    Body mass index is 33.01 kg/m .  Physical Exam   GENERAL: healthy, alert and no distress  MS: Right shoulder with normal ROM.  Patient notes pain with abduction and internal rotation.  +Loving testing.  +Empty can testing. No TTP along bony landmarks.  No obvious deformities.   No c-spine tenderness.    XR CERVICAL SPINE TWO-THREE VIEWS 12/17/2020 5:05 PM      HISTORY: Right shoulder pain, possible cervical radiculopathy.  Numbness and  tingling of right arm.      COMPARISON: None.                                                                  IMPRESSION: Mild dextroconvex curvature of the mid to upper cervical  spine. Otherwise normal alignment. No gross vertebral body height  loss. The intervertebral disc spaces appear largely maintained. There  appear to be some probable atherosclerotic calcifications of the left  carotid bifurcation. The prevertebral soft tissues otherwise appear  within normal limits.     ANGELITO HENDRICKS MD        XR SHOULDER RT G/E 3 VW 12/17/2020 5:05 PM      HISTORY: Acute right shoulder pain; Acute pain of right shoulder     COMPARISON: None.                                                                IMPRESSION: No fractures are identified. Normal glenohumeral  alignment.  Minimal hypertrophic change in the acromioclavicular  joint.     BETO ROMERO MD        Assessment & Plan     Acute pain of right shoulder  - Most likely a rotator cuff tendinopathy, however given some mild radicular-type symptoms consider neck as the cause  - Referred to physical therapy   - RICE therapy at home  - He will try using an arm sling at night to see if it helps him to avoid laying on that side   - XR Shoulder Right G/E 3 Views; Future  - [facility]; Future  - Miscellaneous Order for DME - ONLY FOR DME    Numbness and tingling of right arm  - XR Cervical Spine 2/3 Views; Future  - [facility]; Future       Return in about 4 weeks (around 1/14/2021) for if shoulder pain is not improving.    Maia Andujar DO  [facility]

## 2020-12-17 NOTE — PATIENT INSTRUCTIONS
Patient Education     Understanding Rotator Cuff Tendonitis    The rotator cuff is a group of 4 muscles and tendons in the shoulder. Tendons are tough tissues that connect muscles to bone. The 4 muscles and their tendons form a  cuff  around the head of the upper arm bone. The rotator cuff connects the upper arm to the shoulder blade. It keeps the shoulder joint stable and gives it strength. It also helps the shoulder joint with certain movements. These include reaching the arm over the head and rotating the arm.   If tendons are injured or strained, they may get irritated and swollen (inflamed). This is called tendonitis.  Rotator cuff tendonitis may cause shoulder pain. It may make it hard to move your shoulder.   What causes rotator cuff tendonitis?  When the rotator cuff tendons are injured or overworked it causes tendonitis. The most common cause of injury is repetitive overhead activities. These can be work-related activities such as reaching, pushing, or lifting. Or they can be sports-related activities such as throwing, swimming, or lifting weights.   Symptoms of rotator cuff tendonitis  Pain on the side of the upper arm at the shoulder is the most common symptom. Pain may get worse with overhead movements. Or it can get worse when you raise the arm above shoulder level. It may also hurt to lie on the shoulder at night.   Treatment for rotator cuff tendonitis  Treatment may include:    Active rest. This lets the rotator cuff heal. Active rest means using your arm and shoulder, but not doing activities that cause pain. These might be reaching overhead or sleeping on the shoulder.    Cold packs. Putting ice packs on the shoulder helps reduce swelling and ease pain.    Medicines. Prescription or over-the-counter medicines can help ease pain and swelling. NSAIDs (nonsteroidal anti-inflammatory drugs) are the most common medicines used. They may be taken as pills. Or they may be put on the skin as a gel, cream,  or patch.    Arm and shoulder exercises.  These help keep the shoulder joint moving as it heals. They also help improve the strength of muscles around the joint.  Possible complications  You may be tempted to stop using your shoulder completely to prevent pain. But doing so may lead to a condition called frozen shoulder. To help prevent this, follow instructions you are given for active rest and for doing exercises to help your shoulder heal.   When to call your healthcare provider  Call your healthcare provider right away if you have any of these:    Fever of 100.4 F (38 C) or higher, or as advised by your healthcare provider    Chills    Symptoms that don t get better, or get worse    New symptoms  Tania last reviewed this educational content on 6/1/2019 2000-2020 [employer]. [address] [place] [address], [place] [address]. All rights reserved. This information is not intended as a substitute for professional medical care. Always follow your healthcare professional's instructions.

## 2020-12-23 ENCOUNTER — THERAPY VISIT (OUTPATIENT)
Dept: PHYSICAL THERAPY | Facility: CLINIC | Age: 50
End: 2020-12-23
Attending: FAMILY MEDICINE
Payer: COMMERCIAL

## 2020-12-23 DIAGNOSIS — R20.0 NUMBNESS AND TINGLING OF RIGHT ARM: ICD-10-CM

## 2020-12-23 DIAGNOSIS — M25.511 ACUTE PAIN OF RIGHT SHOULDER: Primary | ICD-10-CM

## 2020-12-23 DIAGNOSIS — R20.2 NUMBNESS AND TINGLING OF RIGHT ARM: ICD-10-CM

## 2020-12-23 PROCEDURE — 97161 PT EVAL LOW COMPLEX 20 MIN: CPT | Mod: GP | Performed by: PHYSICAL THERAPIST

## 2020-12-23 PROCEDURE — 97112 NEUROMUSCULAR REEDUCATION: CPT | Mod: GP | Performed by: PHYSICAL THERAPIST

## 2020-12-23 PROCEDURE — 97110 THERAPEUTIC EXERCISES: CPT | Mod: GP | Performed by: PHYSICAL THERAPIST

## 2020-12-23 NOTE — PROGRESS NOTES
[facility] Initial Evaluation -- Upper Extremity    Evaluation Date: December 23, 2020  Yvon Dunlap is a 50 year old male with a R shld condition.   Referral: PC  Work mechanical stresses: Writer/-Computer and prolonged sitting  Employment status:  Normal  Leisure mechanical stresses: walks 2-3 x/ week 10-15 min  Functional disability score (SPADI): See flowsheet  VAS score (0-10): 4/10, ranges 2-7/10  Handedness (R/L):  R  Patient goals/expectations:  Eliminate R shld pain w/ daily activities    HISTORY    Present symptoms: Lat upper arm R.    Frontal HA past couple of days.  Pain quality (sharp/shooting/stabbing/aching/burning/cramping):  Throbbing and stabbing    Present since (onset date):  A month ago, MD referral 12-    Symptoms (improving/unchanging/worsening):  worsening.    Symptoms commenced as a result of: Unknown   Condition occurred in the following environment: Unknown    Symptoms at onset: same  Paresthesia (yes/no):  Tingling R dorsal forearm to wrist  Spinal history: none   Cough/Sneeze (pos/neg):  no    Constant symptoms: lat R arm and tingling R forearm/wrist  Intermittent symptoms:     Symptoms are worse with the following: Sometimes Dressing, Sometimes Reaching acrpss body, overhead, behind back and Other - lifting heavier objects (cat litter) and sleeping on R shld  Symptoms are better with the following: Other - resting    Continued use makes the pain (better/worse/no effect): worse    Disturbed night (yes/no):  unknown    Pain at rest (yes/no): yes  Site (neck/shoulder/elbow/wrist/hand): Lat upper arm    Other questions (swelling/catching/clicking/locking/subluxing):  none    Previous episodes: none  Previous treatments: none    Specific Questions:  General health (excellent/good/fair/poor):  good  Pertinent medical history includes: Depression  Medications (nil/NSAIDS/analg/steroids/anticoag/other):  Other - Anti-depressants  Medical allergies:   "NKA  Imaging (None/Xray/MRI/Other): x-ray - see epic chart  Recent or major surgery (yes/no): none  Night pain (yes/no): no  Accidents (yes/no): no  Unexplained weight loss (yes/no): no  Barriers at home: none  Other red flags: N&T R forearm/wrist    Sites for physical examination (neck/shoulder/elbow/wrist/hand): Cerv and R shld    EXAMINATION    Posture:  Sitting (good/fair/poor): Poor  Correction of posture (better/worse/no effect/NA): better  Standing (good/fair/poor): Fair scoliosis - thoracic convex L (L shld elevated)  Other observations:      Neurological (NA/motor/sensory/reflexes/dural):     Baselines (pain or functional activity): Pain R upper arm w/ shld flex and abd    Extremities (Shoulder/Elbow/Wrist/Hand): R shld    Movement Loss Lenard Mod Min Nil Pain   Flexion    X ERP +   Extension    X    Abduction    X PDM +++   Internal Rotation (BB)    X PDM +   External Rotation (BH)    X PDM ++      Passive Movement (+/- overpressure)/(PDM/ERP):     W/ OP - mod ERP w/ flex, abd, and ext  Resisted Test Response (pain):    R shld: Flex 4-/4 (+++)    Ext 5/5    Abd 4/5 (++)    ER 4+/5 (+)    IR 5-/5 (+)    Bicep 5-/5 (+)    Tricep 4/5  Other Tests:     Spine:  Movement Loss:    Cerv AROM: Protr WNL     Flex WNL     Retr Min decr - pain Parietal area L     Ext  Min decr     Rot R WNL - slight R neck            L WNL     SB R WNL           L WNL  Effect of repeated movements:    Seated Retr x5 - prod pain L parietal area   Seated Retr holding 20\" x4 Decr pain L parietal area, Better, NE ROM   Seated Retr w/ OP 2 x10 - NE (stretch neck), Better (decr arm aching), Incr ROM cerv, decr pain shld flex  Effect of static positioning: NA  Spine testing (not relevant/relevant/secondary problem): relevant    Baseline Symptoms: NA - will clear C-spine then reassess R shld  Repeated Tests Symptom Response Mechanical Response   Active/Passive movement, resisted test, functional test During - Produce, Abolish, Increase, " Decrease, NE After -   Better, Worse, NB, NW, NE Effect -   ? or ? ROM, strength or key functional test No Effect          Effect of static positioning                  Provisional Classification (Extremity/Spine): Spine - Derangement - Asymmetrical, unilateral, symptoms below elbow      Principle of Management:  Education:  Posture - Neutral Spine, use of L-roll, affect of posture on spine/pain, no couch, recliner, or propping up on pillows in bed, specificity of exer, centralisation/peripheralisation, spine as a source of extremity pain and HEP    Equipment provided:  None - Recommended using rolled towel for L-Roll whenever sitting.  Exercise and dosage:  Seated Cerv Retr w/ OP x 10 6-8 x/day    ASSESSMENT/PLAN:    Patient is a 50 year old male with right side shoulder complaints.    Patient has the following significant findings with corresponding treatment plan.                Diagnosis 1:  Acute R shld pain/Numbness and tingling R arm  Pain -  hot/cold therapy, manual therapy, self management, education, directional preference exercise and home program  Decreased ROM/flexibility - manual therapy, therapeutic exercise and home program  Decreased strength - therapeutic exercise, therapeutic activities and home program  Decreased function - therapeutic activities and home program  Impaired posture - neuro re-education and home program    Therapy Evaluation Codes:   1) History comprised of:   Personal factors that impact the plan of care:      None.    Comorbidity factors that impact the plan of care are:      Depression and Numbness/tingling.     Medications impacting care: Anti-depressant.  2) Examination of Body Systems comprised of:   Body structures and functions that impact the plan of care:      Cervical spine and Shoulder.   Activity limitations that impact the plan of care are:      Dressing, Lifting, Sleeping and reaching across body and out to the side.  3) Clinical presentation characteristics  are:   Stable/Uncomplicated.  4) Decision-Making    Low complexity using standardized patient assessment instrument and/or measureable assessment of functional outcome.  Cumulative Therapy Evaluation is: Low complexity.    Previous and current functional limitations:  (See Goal Flow Sheet for this information)    Short term and Long term goals: (See Goal Flow Sheet for this information)     Communication ability:  Patient appears to be able to clearly communicate and understand verbal and written communication and follow directions correctly.  Treatment Explanation - The following has been discussed with the patient:   RX ordered/plan of care  Anticipated outcomes  Possible risks and side effects  This patient would benefit from PT intervention to resume normal activities.   Rehab potential is good.    Frequency:  1 X week, once daily  Duration:  for 8 weeks  Discharge Plan:  Achieve all LTG.  Independent in home treatment program.  Reach maximal therapeutic benefit.    Please refer to the daily flowsheet for treatment today, total treatment time and time spent performing 1:1 timed codes.

## 2020-12-30 ENCOUNTER — THERAPY VISIT (OUTPATIENT)
Dept: PHYSICAL THERAPY | Facility: CLINIC | Age: 50
End: 2020-12-30
Payer: COMMERCIAL

## 2020-12-30 DIAGNOSIS — R20.2 NUMBNESS AND TINGLING OF RIGHT ARM: ICD-10-CM

## 2020-12-30 DIAGNOSIS — R20.0 NUMBNESS AND TINGLING OF RIGHT ARM: ICD-10-CM

## 2020-12-30 DIAGNOSIS — M25.511 ACUTE PAIN OF RIGHT SHOULDER: Primary | ICD-10-CM

## 2020-12-30 PROCEDURE — 97110 THERAPEUTIC EXERCISES: CPT | Mod: GP | Performed by: PHYSICAL THERAPIST

## 2020-12-30 PROCEDURE — 97530 THERAPEUTIC ACTIVITIES: CPT | Mod: GP | Performed by: PHYSICAL THERAPIST

## 2020-12-30 NOTE — PROGRESS NOTES
Daily Note:    SUBJECTIVE:  See Flowsheet    OBJECTIVE:   Cerv AROM:    Flex WNL    Retr Min Decr    Ext  Min Decr -Stiff CT junction    Rot R WNL - Stiff L neck           L WNL - Stiff R neck    SB  R WNL Stiff L neck           L   WNL - Stiff R neck   R shld AROM: Flex Slight Decr (+)     Ext WNL     Abd WNL - PDM (+)     ER (BH)  WNL - PDM (+)     IR (BB)    WNL - PDM (+)   R shld strength:    Flex  4/5 (+++)    Abd  4/5 (++)    Triceps 4+/5  ASSESSMENT/PLAN:  See Flowsheet

## 2021-01-06 ENCOUNTER — THERAPY VISIT (OUTPATIENT)
Dept: PHYSICAL THERAPY | Facility: CLINIC | Age: 51
End: 2021-01-06
Payer: COMMERCIAL

## 2021-01-06 DIAGNOSIS — M25.511 ACUTE PAIN OF RIGHT SHOULDER: Primary | ICD-10-CM

## 2021-01-06 DIAGNOSIS — R20.2 NUMBNESS AND TINGLING OF RIGHT ARM: ICD-10-CM

## 2021-01-06 DIAGNOSIS — R20.0 NUMBNESS AND TINGLING OF RIGHT ARM: ICD-10-CM

## 2021-01-06 PROCEDURE — 97110 THERAPEUTIC EXERCISES: CPT | Mod: GP | Performed by: PHYSICAL THERAPIST

## 2021-01-06 PROCEDURE — 97530 THERAPEUTIC ACTIVITIES: CPT | Mod: GP | Performed by: PHYSICAL THERAPIST

## 2021-01-06 NOTE — PROGRESS NOTES
Daily Note:    SUBJECTIVE:  See Flowsheet    OBJECTIVE:   Cerv AROM:    Flex WNL    Retr Min Decr - tight    Ext  Min Decr    Rot R WNL - Stiff L neck           L WNL - Stiff R neck    SB  R WNL- tight           L   Slight decr-tight   R shld AROM: Flex Slight Decr (+)     Ext WNL     Abd Slight Decr - PDM (++)     ER (BH)  WNL - PDM (+)     IR (BB)    WNL - PDM (+)   R shld strength:    Flex  4-/5 (+++)    Abd  4/5 (++)    Triceps 4/5  ASSESSMENT/PLAN:  See Flowsheet

## 2021-01-13 ENCOUNTER — THERAPY VISIT (OUTPATIENT)
Dept: PHYSICAL THERAPY | Facility: CLINIC | Age: 51
End: 2021-01-13
Payer: COMMERCIAL

## 2021-01-13 DIAGNOSIS — M25.511 ACUTE PAIN OF RIGHT SHOULDER: Primary | ICD-10-CM

## 2021-01-13 DIAGNOSIS — R20.0 NUMBNESS AND TINGLING OF RIGHT ARM: ICD-10-CM

## 2021-01-13 DIAGNOSIS — R20.2 NUMBNESS AND TINGLING OF RIGHT ARM: ICD-10-CM

## 2021-01-13 PROCEDURE — 97110 THERAPEUTIC EXERCISES: CPT | Mod: GP | Performed by: PHYSICAL THERAPIST

## 2021-01-13 PROCEDURE — 97530 THERAPEUTIC ACTIVITIES: CPT | Mod: GP | Performed by: PHYSICAL THERAPIST

## 2021-01-13 NOTE — PROGRESS NOTES
Daily Note:    SUBJECTIVE:  See Flowsheet    OBJECTIVE:   Cerv AROM:    Flex WNL    Retr Min Decr - tight L neck    Ext  Min Decr - tight L neck  Rot R WNL - Stiff L neck           L WNL - Stiff R neck    SB  R WNL- tight           L   WNL-tight   R shld AROM: Flex Slight Decr (+)     Ext WNL (+)     Abd WNL - PDM (++)     ER (BH)  WNL - PDM (+)     IR (BB)    WNL    R shld strength:    Flex  4/5 (+++)    Abd  4/5 (++)    Triceps 4+/5  ASSESSMENT/PLAN:  See Flowsheet

## 2021-01-15 ENCOUNTER — HEALTH MAINTENANCE LETTER (OUTPATIENT)
Age: 51
End: 2021-01-15

## 2021-01-20 ENCOUNTER — THERAPY VISIT (OUTPATIENT)
Dept: PHYSICAL THERAPY | Facility: CLINIC | Age: 51
End: 2021-01-20
Payer: COMMERCIAL

## 2021-01-20 DIAGNOSIS — R20.0 NUMBNESS AND TINGLING OF RIGHT ARM: ICD-10-CM

## 2021-01-20 DIAGNOSIS — R20.2 NUMBNESS AND TINGLING OF RIGHT ARM: ICD-10-CM

## 2021-01-20 DIAGNOSIS — M25.511 ACUTE PAIN OF RIGHT SHOULDER: Primary | ICD-10-CM

## 2021-01-20 PROCEDURE — 97110 THERAPEUTIC EXERCISES: CPT | Mod: GP | Performed by: PHYSICAL THERAPIST

## 2021-01-20 PROCEDURE — 97530 THERAPEUTIC ACTIVITIES: CPT | Mod: GP | Performed by: PHYSICAL THERAPIST

## 2021-01-20 PROCEDURE — 97140 MANUAL THERAPY 1/> REGIONS: CPT | Mod: GP | Performed by: PHYSICAL THERAPIST

## 2021-01-20 NOTE — PROGRESS NOTES
Daily Note:    SUBJECTIVE:  See Flowsheet    OBJECTIVE:   Cerv AROM:    Flex WNL    Retr WNL     Ext  Min Decr - tight L neck  Rot R WNL            L WNL     SB  R WNL           L   WNL   R shld AROM: Flex WNL (+)     Ext WNL      Abd WNL - PDM (++)     ER (BH)  WNL - PDM (+)     IR (BB)    WNL    R shld strength:    Flex  4/5 (+++)    Abd  4+/5 (++)    Triceps 4+/5 (++)  ASSESSMENT/PLAN:  See Flowsheet

## 2021-01-27 ENCOUNTER — THERAPY VISIT (OUTPATIENT)
Dept: PHYSICAL THERAPY | Facility: CLINIC | Age: 51
End: 2021-01-27
Payer: COMMERCIAL

## 2021-01-27 DIAGNOSIS — M25.511 ACUTE PAIN OF RIGHT SHOULDER: Primary | ICD-10-CM

## 2021-01-27 DIAGNOSIS — R20.0 NUMBNESS AND TINGLING OF RIGHT ARM: ICD-10-CM

## 2021-01-27 DIAGNOSIS — R20.2 NUMBNESS AND TINGLING OF RIGHT ARM: ICD-10-CM

## 2021-01-27 PROCEDURE — 97530 THERAPEUTIC ACTIVITIES: CPT | Mod: GP | Performed by: PHYSICAL THERAPIST

## 2021-01-27 PROCEDURE — 97110 THERAPEUTIC EXERCISES: CPT | Mod: GP | Performed by: PHYSICAL THERAPIST

## 2021-01-27 NOTE — PROGRESS NOTES
Daily Note:    SUBJECTIVE:  See Flowsheet    OBJECTIVE:   Cerv AROM:    Flex WNL    Retr WNL     Ext  Min Decr - tight across Upper back    Rot R Slight decr - slight L neck            L WNL     SB  R WNL - tight           L   WNL - tight   R shld AROM: Flex WNL (+)     Ext WNL (+)     Abd WNL - PDM (++)     ER (BH)  WNL - PDM (+)     IR (BB)    WNL      Horizontal Add - Slight decr (++)   R shld strength:    Flex  4/5 (+++)    Abd  4+/5 (++)    Triceps 5-/5 (+)  ASSESSMENT/PLAN:  See Flowsheet

## 2021-02-03 ENCOUNTER — THERAPY VISIT (OUTPATIENT)
Dept: PHYSICAL THERAPY | Facility: CLINIC | Age: 51
End: 2021-02-03
Payer: COMMERCIAL

## 2021-02-03 DIAGNOSIS — R20.2 NUMBNESS AND TINGLING OF RIGHT ARM: ICD-10-CM

## 2021-02-03 DIAGNOSIS — R20.0 NUMBNESS AND TINGLING OF RIGHT ARM: ICD-10-CM

## 2021-02-03 DIAGNOSIS — M25.511 ACUTE PAIN OF RIGHT SHOULDER: Primary | ICD-10-CM

## 2021-02-03 PROCEDURE — 97140 MANUAL THERAPY 1/> REGIONS: CPT | Mod: GP | Performed by: PHYSICAL THERAPIST

## 2021-02-03 PROCEDURE — 97110 THERAPEUTIC EXERCISES: CPT | Mod: GP | Performed by: PHYSICAL THERAPIST

## 2021-02-03 PROCEDURE — 97530 THERAPEUTIC ACTIVITIES: CPT | Mod: GP | Performed by: PHYSICAL THERAPIST

## 2021-02-03 NOTE — PROGRESS NOTES
Daily Note:    SUBJECTIVE:  See Flowsheet    OBJECTIVE:   Cerv AROM:    Flex WNL    Retr WNL     Ext  WNL - tight across Upper back    Rot R WNL - tight L neck           L WNL - tight L neck    SB  R WNL            L   WNL    R shld AROM: Flex Slight decr (+PDM)     Ext Slight Decr      Abd WNL - PDM (++)     ER (BH)  WNL - PDM (+)     IR (BB)    WNL      Horizontal Add - Slight decr (++)   R shld strength:    Flex  4/5 (++)    Abd  4+/5 (++)    ER  4-/5 (++)    IR  4+/5 (+)    Biceps  4+/5 (+)    Triceps 4/5 (+)  ASSESSMENT/PLAN:  See Flowsheet

## 2021-02-10 ENCOUNTER — THERAPY VISIT (OUTPATIENT)
Dept: PHYSICAL THERAPY | Facility: CLINIC | Age: 51
End: 2021-02-10
Payer: COMMERCIAL

## 2021-02-10 DIAGNOSIS — R20.2 NUMBNESS AND TINGLING OF RIGHT ARM: ICD-10-CM

## 2021-02-10 DIAGNOSIS — R20.0 NUMBNESS AND TINGLING OF RIGHT ARM: ICD-10-CM

## 2021-02-10 DIAGNOSIS — M25.511 ACUTE PAIN OF RIGHT SHOULDER: Primary | ICD-10-CM

## 2021-02-10 PROCEDURE — 97110 THERAPEUTIC EXERCISES: CPT | Mod: GP | Performed by: PHYSICAL THERAPIST

## 2021-02-10 PROCEDURE — 97530 THERAPEUTIC ACTIVITIES: CPT | Mod: GP | Performed by: PHYSICAL THERAPIST

## 2021-02-10 NOTE — Clinical Note
Dr Andujar/Gonzalo Huizar,  I have been working w/ Yvon Moralesmarcie with his R shld and Forearm/hand symptoms.  I have asked him to return to you because we are not seeing much improvement.  His posture has improved some and he is using a better chair  at home for work (both posture and work station at home could use improvement).  Also, he he sleeps on a couch and says that will not change.  We have tried addressing the shoulder pain which increased forearm symptoms.  Treating the neck decreased forearm and shld symptoms, but he had and exacerbation and we have not been able to progress again.  I would recommend further assessment at this time.  We would be happy to see Yvon at a later date when his pain is more controlled.    Daniel Correia PT, Rogelio MDT   [facility]  [facility]  [phone]

## 2021-02-10 NOTE — PROGRESS NOTES
PROGRESS  REPORT    Progress reporting period is from 12- to 2-.       SUBJECTIVE  Subjective changes noted by patient:  Pain is significantly decr the past 2-3 days.  Having less of the sharp pains (most noticeable w/ a startled movement), tingling in forearm and hand has decreased.  Is still waking 1 x/night.  Has also been taking Tylenol more regularly, this may be the reason for decr pain the past few days.   Current Pain level: 1/10(ranges 1-3/10).     Initial Pain level: 4/10(ranges 2-7/10).   Changes in function:  Yes (See Goal flowsheet attached for changes in current functional level)  Adverse reaction to treatment or activity: None    OBJECTIVE   Cerv AROM:    Flex WNL    Retr WNL     Ext  WNL - tight across Upper back    Rot R WNL - tight L neck           L WNL - tight L neck    SB  R WNL            L   WNL - slight R neck    R shld AROM: Flex WNL (+PDM)     Ext WNL (+ERP ant lat upper arm and forearm)      Abd WNL - PDM (++)     ER (BH)  WNL - PDM (++)     IR (BB)    WNL      Horizontal Add - Slight decr (++)   R shld strength:    Flex  4-/5 (+++)    Abd  4+/5 (+)    ER  4/5 (++)    IR  4+/5 (+)    Biceps  4+/5 (+)    Triceps 4+/5 (+)    ASSESSMENT/PLAN:  Updated problem list and treatment plan:   Diagnosis 1:  Acute R shld pain/Numbness and tingling R arm  Pain -  hot/cold therapy, manual therapy, self management, education, directional preference exercise and home program  Decreased ROM/flexibility - manual therapy, therapeutic exercise and home program  Decreased strength - therapeutic exercise, therapeutic activities and home program  Decreased function - therapeutic activities and home program  Impaired posture - neuro re-education and home program  STG/LTGs have been met or progress has been made towards goals:  Yes (See Goal flow sheet completed today.)  Assessment of Progress: Pt was demonstrating improvement initially, but had exacerbation about 3 weeks ago and pain is not  returning to pre-exacerbation level again.  Self Management Plans:  Patient has been instructed in a home treatment program.  Yvon continues to require the following intervention to meet STG and LTG's:  Continued PT if receives intervention for pain control.    Recommendations:  This patient would benefit from further evaluation.

## 2021-02-16 ENCOUNTER — OFFICE VISIT (OUTPATIENT)
Dept: FAMILY MEDICINE | Facility: CLINIC | Age: 51
End: 2021-02-16
Payer: COMMERCIAL

## 2021-02-16 DIAGNOSIS — M25.511 RIGHT SHOULDER PAIN, UNSPECIFIED CHRONICITY: Primary | ICD-10-CM

## 2021-02-16 DIAGNOSIS — M54.12 RIGHT CERVICAL RADICULOPATHY: ICD-10-CM

## 2021-02-16 DIAGNOSIS — Z86.39 HISTORY OF VITAMIN D DEFICIENCY: ICD-10-CM

## 2021-02-16 PROCEDURE — 36415 COLL VENOUS BLD VENIPUNCTURE: CPT | Performed by: PHYSICIAN ASSISTANT

## 2021-02-16 PROCEDURE — 99213 OFFICE O/P EST LOW 20 MIN: CPT | Performed by: PHYSICIAN ASSISTANT

## 2021-02-16 PROCEDURE — 82306 VITAMIN D 25 HYDROXY: CPT | Performed by: PHYSICIAN ASSISTANT

## 2021-02-16 RX ORDER — GABAPENTIN 300 MG/1
300-600 CAPSULE ORAL
Qty: 60 CAPSULE | Refills: 0 | Status: SHIPPED | OUTPATIENT
Start: 2021-02-16 | End: 2021-03-16

## 2021-02-16 NOTE — PROGRESS NOTES
Assessment & Plan     Right shoulder pain, unspecified chronicity  Ongoing. Exam is a bit unclear as he has positive shoulder exam findings but history suggestive more of a radicular cause. Reviewed options. Will start Gabapentin for pain. Due to shoulder related pain, offered a therapeutic/diagnostic subacromial injection if this is a RTC related pathology. He would like to try that. Verbal PARQ for injection is discussed and after verbal and written consent are given, the area was prepped in a sterile fashion and  0.1cc of kenalog 40mg/mL + 4cc of 2% lidocaine is injected into the right subacromial space.  The patient tolerated the procedure well and there were no complications. I want to see how he feels in the next few days. If no improvement, I'd favor a cervical source and consider an MRI of his neck  - gabapentin (NEURONTIN) 300 MG capsule; Take 1-2 capsules (300-600 mg) by mouth nightly as needed (pain at bedtime)    Right cervical radiculopathy  As noted. Negative spurling on exam and no weakness, sensation appears intact   - gabapentin (NEURONTIN) 300 MG capsule; Take 1-2 capsules (300-600 mg) by mouth nightly as needed (pain at bedtime)    History of vitamin D deficiency  Recheck   - Vitamin D Deficiency    No follow-ups on file.    MIHAELA LANZA [facility]    Imani Sanz is a 50 year old who presents for the following health issues     HPI       Musculoskeletal problem/pain  Onset/Duration: 2 months  Description  Location: shoulder - right  Joint Swelling: no  Redness: no  Pain: YES, achy pain  Warmth: no  Intensity:  1/10 mild to moderate  Progression of Symptoms:  Same, it goes up and down, worse in morning after waking up. Sleeps on couch because he snores.   Accompanying signs and symptoms:   Fevers: no  Numbness/tingling/weakness: YES, Tingling that radiates from elbow to 3 middle fingers.  History  Trauma to the area: no  Recent illness:   no  Previous similar problem: YES  Previous evaluation:  YES, PT says they think it may be a neck problem that is causing right shoulder pain.   Precipitating or alleviating factors:  Aggravating factors include: lifting, raising elbow, picking things up.   Therapies tried and outcome: physical therapy 6 times it has helped but not completely better. Doing stretches and exercises. Taking multivitamin more regularly helped.       A few days ago, stepped on shard of glass went into left heel, not sure if got everything out. Painful to walk on till this morning.     Patient would like to check for deficiency in vitamin d, not getting a lot of sunlight due to working from home.     Patient is wanted to discuss taking black pepper, tumeric, and collagen.       Review of Systems   Constitutional, HEENT, cardiovascular, pulmonary, gi and gu systems are negative, except as otherwise noted.      Objective    /89 (BP Location: Right arm, Patient Position: Chair, Cuff Size: Adult Large)   Pulse 100   Temp 97.5  F (36.4  C) (Tympanic)   Wt 105.3 kg (232 lb 3.2 oz)   BMI 33.80 kg/m    Body mass index is 33.8 kg/m .  Physical Exam   GENERAL: healthy, alert and no distress  MUSC: ROM of the neck intact, no deformity, some muscle spasm and tenderness of the right levator, trapezius attachment points. Right shoulder has mildly positive neer, hawkin, empty can tests. Strength and reflexes, sensation are all symmetric and intact.

## 2021-02-18 VITALS
WEIGHT: 232.2 LBS | HEART RATE: 100 BPM | SYSTOLIC BLOOD PRESSURE: 139 MMHG | BODY MASS INDEX: 33.8 KG/M2 | TEMPERATURE: 97.5 F | DIASTOLIC BLOOD PRESSURE: 89 MMHG

## 2021-02-18 LAB — DEPRECATED CALCIDIOL+CALCIFEROL SERPL-MC: 18 UG/L (ref 20–75)

## 2021-02-19 ENCOUNTER — MYC MEDICAL ADVICE (OUTPATIENT)
Dept: FAMILY MEDICINE | Facility: CLINIC | Age: 51
End: 2021-02-19

## 2021-02-19 DIAGNOSIS — M54.12 RIGHT CERVICAL RADICULOPATHY: Primary | ICD-10-CM

## 2021-03-14 DIAGNOSIS — M25.511 RIGHT SHOULDER PAIN, UNSPECIFIED CHRONICITY: ICD-10-CM

## 2021-03-14 DIAGNOSIS — M54.12 RIGHT CERVICAL RADICULOPATHY: ICD-10-CM

## 2021-03-16 RX ORDER — GABAPENTIN 300 MG/1
300-600 CAPSULE ORAL
Qty: 180 CAPSULE | Refills: 0 | Status: SHIPPED | OUTPATIENT
Start: 2021-03-16 | End: 2022-12-02

## 2021-03-16 NOTE — TELEPHONE ENCOUNTER
Routing refill request to provider for review/approval because:  Drug not on the [facility] refill protocol     Pending Prescriptions:                       Disp   Refills    gabapentin (NEURONTIN) 300 MG capsule [Pha*60 cap*0        Sig: Take 1-2 capsules (300-600 mg) by mouth nightly as           needed (pain at bedtime)      Michaela Herrera RN

## 2021-03-23 DIAGNOSIS — Z11.59 ENCOUNTER FOR SCREENING FOR OTHER VIRAL DISEASES: ICD-10-CM

## 2021-04-03 DIAGNOSIS — Z11.59 ENCOUNTER FOR SCREENING FOR OTHER VIRAL DISEASES: ICD-10-CM

## 2021-04-03 LAB
LABORATORY COMMENT REPORT: NORMAL
SARS-COV-2 RNA RESP QL NAA+PROBE: NEGATIVE
SARS-COV-2 RNA RESP QL NAA+PROBE: NORMAL
SPECIMEN SOURCE: NORMAL
SPECIMEN SOURCE: NORMAL

## 2021-04-03 PROCEDURE — U0005 INFEC AGEN DETEC AMPLI PROBE: HCPCS | Performed by: SPECIALIST

## 2021-04-03 PROCEDURE — U0003 INFECTIOUS AGENT DETECTION BY NUCLEIC ACID (DNA OR RNA); SEVERE ACUTE RESPIRATORY SYNDROME CORONAVIRUS 2 (SARS-COV-2) (CORONAVIRUS DISEASE [COVID-19]), AMPLIFIED PROBE TECHNIQUE, MAKING USE OF HIGH THROUGHPUT TECHNOLOGIES AS DESCRIBED BY CMS-2020-01-R: HCPCS | Performed by: SPECIALIST

## 2021-04-06 ENCOUNTER — ANESTHESIA EVENT (OUTPATIENT)
Dept: SURGERY | Facility: CLINIC | Age: 51
End: 2021-04-06
Payer: COMMERCIAL

## 2021-04-06 ENCOUNTER — OFFICE VISIT (OUTPATIENT)
Dept: FAMILY MEDICINE | Facility: CLINIC | Age: 51
End: 2021-04-06
Payer: COMMERCIAL

## 2021-04-06 VITALS
TEMPERATURE: 98 F | OXYGEN SATURATION: 97 % | SYSTOLIC BLOOD PRESSURE: 132 MMHG | HEART RATE: 90 BPM | DIASTOLIC BLOOD PRESSURE: 88 MMHG | WEIGHT: 231 LBS | RESPIRATION RATE: 23 BRPM | BODY MASS INDEX: 34.21 KG/M2 | HEIGHT: 69 IN

## 2021-04-06 DIAGNOSIS — Z01.818 PREOP GENERAL PHYSICAL EXAM: Primary | ICD-10-CM

## 2021-04-06 DIAGNOSIS — R20.0 NUMBNESS AND TINGLING OF RIGHT ARM: ICD-10-CM

## 2021-04-06 DIAGNOSIS — M54.12 CERVICAL NEUROPATHIC PAIN: ICD-10-CM

## 2021-04-06 DIAGNOSIS — R20.2 NUMBNESS AND TINGLING OF RIGHT ARM: ICD-10-CM

## 2021-04-06 PROCEDURE — 99213 OFFICE O/P EST LOW 20 MIN: CPT | Performed by: FAMILY MEDICINE

## 2021-04-06 ASSESSMENT — MIFFLIN-ST. JEOR: SCORE: 1902.8

## 2021-04-06 ASSESSMENT — PAIN SCALES - GENERAL: PAINLEVEL: MILD PAIN (2)

## 2021-04-06 NOTE — PATIENT INSTRUCTIONS

## 2021-04-06 NOTE — PROGRESS NOTES
[address] [address]  Phone: [phone]  Primary Provider: Lio Huizar  Pre-op Performing Provider: IKE YAP      PREOPERATIVE EVALUATION:  Today's date: 4/6/2021    Yvon Dunlap is a 50 year old male who presents for a preoperative evaluation.    Surgical Information:  Surgery/Procedure: MRI with sedation  Surgery Location: [facility]  Surgeon: INDRA  Surgery Date: 04/07/2021  Time of Surgery: 10 Am  Where patient plans to recover: At home with family  Fax number for surgical facility: Note does not need to be faxed, will be available electronically in Epic.    Type of Anesthesia Anticipated: to be determined    Assessment & Plan     The proposed surgical procedure is considered LOW risk.    Preop general physical exam      Numbness and tingling of right arm      Cervical neuropathic pain        Possible Sleep Apnea: no   No flowsheet data found.         Risks and Recommendations:  The patient has the following additional risks and recommendations for perioperative complications:   - No identified additional risk factors other than previously addressed    Medication Instructions:  Patient is to take all scheduled medications on the day of surgery    RECOMMENDATION:  APPROVAL GIVEN to proceed with proposed procedure, without further diagnostic evaluation.      5  Subjective     HPI related to upcoming procedure: Cervical neuropathy, patient claustrophobic cannot have a regular MRI.    Preop Questions 4/6/2021   1. Have you ever had a heart attack or stroke? No   2. Have you ever had surgery on your heart or blood vessels, such as a stent placement, a coronary artery bypass, or surgery on an artery in your head, neck, heart, or legs? No   3. Do you have chest pain with activity? No   4. Do you have a history of  heart failure? No   5. Do you currently have a cold, bronchitis or symptoms of other infection? No   6. Do you have a cough,  shortness of breath, or wheezing? No   7. Do you or anyone in your family have previous history of blood clots? No   8. Do you or does anyone in your family have a serious bleeding problem such as prolonged bleeding following surgeries or cuts? No   9. Have you ever had problems with anemia or been told to take iron pills? YES - history been stable   10. Have you had any abnormal blood loss such as black, tarry or bloody stools? No   11. Have you ever had a blood transfusion? UNKNOWN - ?   12. Are you willing to have a blood transfusion if it is medically needed before, during, or after your surgery? Yes   13. Have you or any of your relatives ever had problems with anesthesia? No   14. Do you have sleep apnea, excessive snoring or daytime drowsiness? UNKNOWN -    15. Do you have any artifical heart valves or other implanted medical devices like a pacemaker, defibrillator, or continuous glucose monitor? No   16. Do you have artificial joints? No   17. Are you allergic to latex? No       Health Care Directive:  Patient does not have a Health Care Directive or Living Will: N/A  56}  Status of Chronic Conditions:  See problem list for active medical problems.  Problems all longstanding and stable, except as noted/documented.  See ROS for pertinent symptoms related to these conditions.      Review of Systems  CONSTITUTIONAL: NEGATIVE for fever, chills, change in weight  EYES: NEGATIVE for vision changes or irritation  ENT/MOUTH: NEGATIVE for ear, mouth and throat problems  RESP: NEGATIVE for significant cough or SOB  BREAST: NEGATIVE for masses, tenderness or discharge  CV: NEGATIVE for chest pain, palpitations or peripheral edema  GI: NEGATIVE for nausea, abdominal pain, heartburn, or change in bowel habits  : NEGATIVE for frequency, dysuria, or hematuria  MUSCULOSKELETAL: NEGATIVE for significant arthralgias or myalgia  NEURO: NEGATIVE for weakness, dizziness or paresthesias  ENDOCRINE: NEGATIVE for temperature  intolerance, skin/hair changes  HEME: NEGATIVE for bleeding problems  PSYCHIATRIC: NEGATIVE for changes in mood or affect    Patient Active Problem List    Diagnosis Date Noted     Acute pain of right shoulder 12/23/2020     Priority: Medium     Numbness and tingling of right arm 12/23/2020     Priority: Medium     Epistaxis 05/20/2014     Priority: Medium     Do you wish to do the replacement in the background? yes         Dysthymic disorder 09/26/2013     Priority: Medium     Family history of colon cancer 08/29/2013     Priority: Medium     Father - Uncertain on age - possibly mid 50s - patient will let me know and we will screen accordingly        Family history of prostate cancer 08/29/2013     Priority: Medium     Father - uncertain age        Erectile dysfunction 06/29/2012     Priority: Medium     HYPERLIPIDEMIA LDL GOAL <160 05/09/2010     Priority: Medium     Allergic rhinitis 05/08/2006     Priority: Medium     Problem list name updated by automated process. Provider to review        Past Medical History:   Diagnosis Date     ALLERGIC RHINITIS NOS 5/8/2006     Depressive disorder Sometime in the 1990s, I think     Depressive disorder, not elsewhere classified      Family history of colon cancer      Other and unspecified hyperlipidemia      Past Surgical History:   Procedure Laterality Date     COLONOSCOPY  May 2016     HERNIA REPAIR  2008 or 2009     NO HISTORY OF SURGERY       Current Outpatient Medications   Medication Sig Dispense Refill     buPROPion (WELLBUTRIN XL) 150 MG 24 hr tablet Take 1 tablet (150 mg) by mouth every morning (Take in addition to 300 mg dose) 90 tablet 3     CLARITIN 10 MG OR TABS 1 TABLET DAILY prn for allergies       IBUPROFEN 600 MG OR TABS 1 tab prn for pain 0 0     Multiple Vitamin (DAILY MULTIVITAMIN PO) Take  by mouth daily.       Omega-3 Fatty Acids (FISH OIL PO) Take by mouth daily        phenylephrine (RONY-SYNEPHRINE) 0.25 % nasal spray Spray 1 spray into both nostrils  "every 4 hours as needed for congestion or other (Nose bleed) 15 mL 0     polyethylene glycol (MIRALAX) 17 GM/SCOOP powder Take 17 g (1 capful) by mouth daily 507 g 0     propranolol (INDERAL) 20 MG tablet TAKE 1 TO 2 TABLETS, 30 MINUTES BEFORE ANXIETY INDUCING EVENT 90 tablet 0     Pseudoeph-Doxylamine-DM-APAP (NYQUIL PO) Take by mouth daily as needed        sildenafil (VIAGRA) 100 MG tablet Take 1 tablet (100 mg) by mouth daily as needed TAKE 1 TABLET BY MOUTH DAILY AS NEEDED. TAKE 30 MINS-4 HRS PRIOR TO INTERCOURSE 12 tablet 3     triamcinolone (KENALOG) 0.1 % external cream Apply topically 2 times daily as needed for irritation Apply sparingly to affected area three times daily as needed 45 g 1     TYLENOL 325 MG OR TABS ONE TO TWO TABLETS EVERY 4 TO 6 HOURS AS NEEDED FOR PAIN 100 0     buPROPion (WELLBUTRIN XL) 300 MG 24 hr tablet Take 1 tablet (300 mg) by mouth every morning (Patient not taking: Reported on 12/17/2020) 90 tablet 3     gabapentin (NEURONTIN) 300 MG capsule TAKE 1-2 CAPSULES (300-600 MG) BY MOUTH NIGHTLY AS NEEDED (PAIN AT BEDTIME) (Patient not taking: Reported on 4/6/2021) 180 capsule 0     UNABLE TO FIND MEDICATION NAME: Zinc         No Known Allergies     Social History     Tobacco Use     Smoking status: Never Smoker     Smokeless tobacco: Never Used   Substance Use Topics     Alcohol use: Yes     Comment: moderate, 2 beers per day     Family History   Problem Relation Age of Onset     Cancer - colorectal Father      Prostate Cancer Father      Heart Disease Father         heart attack     Colon Cancer Father      Breast Cancer Sister      Diabetes No family hx of      C.A.D. No family hx of      Hypertension No family hx of      Cerebrovascular Disease No family hx of      History   Drug Use No         Objective     BP (!) 132/96 (BP Location: Right arm, Patient Position: Chair, Cuff Size: Adult Large)   Pulse 90   Temp 98  F (36.7  C) (Oral)   Resp 23   Ht 1.76 m (5' 9.29\")   Wt 104.8 " kg (231 lb)   SpO2 97%   BMI 33.83 kg/m      Physical Exam    GENERAL APPEARANCE: healthy, alert and no distress     HENT: ear canals and TM's normal and nose and mouth without ulcers or lesions     NECK: no adenopathy, no asymmetry, masses, or scars and thyroid normal to palpation     RESP: lungs clear to auscultation - no rales, rhonchi or wheezes     CV: regular rates and rhythm, normal S1 S2, no S3 or S4 and no murmur, click or rub     ABDOMEN:  soft, nontender, no HSM or masses and bowel sounds normal     MS: extremities normal- no gross deformities noted, no evidence of inflammation in joints, FROM in all extremities.     SKIN: no suspicious lesions or rashes     NEURO: Normal strength and tone, sensory exam grossly normal, mentation intact and speech normal     PSYCH: mentation appears normal. and affect normal/bright     LYMPHATICS: No cervical adenopathy    Recent Labs   Lab Test 07/11/20  0023 07/09/20  0218   HGB 14.8 13.7    221   INR 1.04  --      --    POTASSIUM 3.8  --    CR 0.93  --         Diagnostics:  No labs were ordered during this visit.   No EKG required for low risk surgery (cataract, skin procedure, breast biopsy, etc).    Revised Cardiac Risk Index (RCRI):  The patient has the following serious cardiovascular risks for perioperative complications:   - No serious cardiac risks = 0 points     RCRI Interpretation: 0 points: Class I (very low risk - 0.4% complication rate)           Signed Electronically by: Suri Galaviz MD  Copy of this evaluation report is provided to requesting physician.

## 2021-04-07 ENCOUNTER — ANESTHESIA (OUTPATIENT)
Dept: SURGERY | Facility: CLINIC | Age: 51
End: 2021-04-07
Payer: COMMERCIAL

## 2021-04-07 ENCOUNTER — HOSPITAL ENCOUNTER (OUTPATIENT)
Facility: CLINIC | Age: 51
Discharge: HOME | End: 2021-04-07
Attending: ANESTHESIOLOGY | Admitting: ANESTHESIOLOGY
Payer: COMMERCIAL

## 2021-04-07 ENCOUNTER — HOSPITAL ENCOUNTER (OUTPATIENT)
Dept: MRI IMAGING | Facility: CLINIC | Age: 51
End: 2021-04-07
Attending: PHYSICIAN ASSISTANT | Admitting: ANESTHESIOLOGY
Payer: COMMERCIAL

## 2021-04-07 VITALS
WEIGHT: 231 LBS | SYSTOLIC BLOOD PRESSURE: 128 MMHG | OXYGEN SATURATION: 95 % | RESPIRATION RATE: 20 BRPM | HEART RATE: 77 BPM | DIASTOLIC BLOOD PRESSURE: 91 MMHG | BODY MASS INDEX: 33.83 KG/M2 | TEMPERATURE: 97.8 F

## 2021-04-07 DIAGNOSIS — M54.12 RIGHT CERVICAL RADICULOPATHY: ICD-10-CM

## 2021-04-07 PROCEDURE — 250N000011 HC RX IP 250 OP 636: Performed by: NURSE ANESTHETIST, CERTIFIED REGISTERED

## 2021-04-07 PROCEDURE — 370N000017 HC ANESTHESIA TECHNICAL FEE, PER MIN

## 2021-04-07 PROCEDURE — 72141 MRI NECK SPINE W/O DYE: CPT | Mod: 26 | Performed by: STUDENT IN AN ORGANIZED HEALTH CARE EDUCATION/TRAINING PROGRAM

## 2021-04-07 PROCEDURE — 710N000012 HC RECOVERY PHASE 2, PER MINUTE

## 2021-04-07 PROCEDURE — 72141 MRI NECK SPINE W/O DYE: CPT

## 2021-04-07 PROCEDURE — 258N000003 HC RX IP 258 OP 636: Performed by: NURSE ANESTHETIST, CERTIFIED REGISTERED

## 2021-04-07 PROCEDURE — 999N000141 HC STATISTIC PRE-PROCEDURE NURSING ASSESSMENT

## 2021-04-07 RX ORDER — LIDOCAINE 40 MG/G
CREAM TOPICAL
Status: CANCELLED | OUTPATIENT
Start: 2021-04-07

## 2021-04-07 RX ORDER — SODIUM CHLORIDE, SODIUM LACTATE, POTASSIUM CHLORIDE, CALCIUM CHLORIDE 600; 310; 30; 20 MG/100ML; MG/100ML; MG/100ML; MG/100ML
INJECTION, SOLUTION INTRAVENOUS CONTINUOUS PRN
Status: DISCONTINUED | OUTPATIENT
Start: 2021-04-07 | End: 2021-04-07

## 2021-04-07 RX ADMIN — SODIUM CHLORIDE, POTASSIUM CHLORIDE, SODIUM LACTATE AND CALCIUM CHLORIDE: 600; 310; 30; 20 INJECTION, SOLUTION INTRAVENOUS at 12:00

## 2021-04-07 RX ADMIN — MIDAZOLAM 2 MG: 1 INJECTION INTRAMUSCULAR; INTRAVENOUS at 12:00

## 2021-04-07 NOTE — DISCHARGE INSTRUCTIONS
[facility]  Same-Day Surgery   Adult Discharge Orders & Instructions     For 24 hours after surgery    1. Get plenty of rest.  A responsible adult must stay with you for at least 24 hours after you leave the hospital.   2. Do not drive or use heavy equipment.  If you have weakness or tingling, don't drive or use heavy equipment until this feeling goes away.  3. Do not drink alcohol.  4. Avoid strenuous or risky activities.  Ask for help when climbing stairs.   5. You may feel lightheaded.  IF so, sit for a few minutes before standing.  Have someone help you get up.   6. If you have nausea (feel sick to your stomach): Drink only clear liquids such as apple juice, ginger ale, broth or 7-Up.  Rest may also help.  Be sure to drink enough fluids.  Move to a regular diet as you feel able.  7. You may have a slight fever. Call the doctor if your fever is over 100 F (37.7 C) (taken under the tongue) or lasts longer than 24 hours.  8. You may have a dry mouth, a sore throat, muscle aches or trouble sleeping.  These should go away after 24 hours.  9. Do not make important or legal decisions.   Call your doctor for any of the followin.  Signs of infection (fever, growing tenderness at the surgery site, a large amount of drainage or bleeding, severe pain, foul-smelling drainage, redness, swelling).    2. It has been over 8 to 10 hours since surgery and you are still not able to urinate (pass water).    3.  Headache for over 24 hours.    To contact a doctor, call:  1. Call your PCP Dr. Lio Huizar  2. [phone] and ask for the anesthesia resident on call  3. Come to the nearest Emergency Department or [facility] Emergency Department at [phone]

## 2021-04-07 NOTE — OR NURSING
Discharge instructions provided to patient; instructions reviewed with patient and patients brother (Osiel) via the phone. Questions asked and answered appropriately. Patient and brother verbalized understanding.

## 2021-04-07 NOTE — OR NURSING
CONG aware patient has met phase II discharge criteria [facility] gave okay for patient to dischaurge and will complete sign-out.

## 2021-04-07 NOTE — ANESTHESIA PREPROCEDURE EVALUATION
Anesthesia Pre-Procedure Evaluation    Patient: Yvon Dunlap   MRN: [id number] : 1970        Preoperative Diagnosis: Right cervical radiculopathy [M54.12]   Procedure : Procedure(s):  ANESTHESIA OUT OF OR Magnetic resonance imaging cervical spine @1200     Past Medical History:   Diagnosis Date     ALLERGIC RHINITIS NOS 2006     Depressive disorder Sometime in the 1990s, I think     Depressive disorder, not elsewhere classified      Family history of colon cancer      Other and unspecified hyperlipidemia       Past Surgical History:   Procedure Laterality Date     COLONOSCOPY  2016     HERNIA REPAIR   or       No Known Allergies   Social History     Tobacco Use     Smoking status: Never Smoker     Smokeless tobacco: Never Used   Substance Use Topics     Alcohol use: Yes     Comment: moderate, 2 beers per day      Wt Readings from Last 1 Encounters:   21 104.8 kg (231 lb)        Anesthesia Evaluation   Pt has had prior anesthetic.     No history of anesthetic complications       ROS/MED HX  ENT/Pulmonary:     (+) CT risk factors, snores loudly,     Neurologic:  - neg neurologic ROS     Cardiovascular: Comment: Erectile dysfunction - neg cardiovascular ROS     METS/Exercise Tolerance: >4 METS    Hematologic:  - neg hematologic  ROS     Musculoskeletal: Comment: Right shoulder pain, undergoing MRI to assess for c-spine etiology      GI/Hepatic:  - neg GI/hepatic ROS  (-) GERD   Renal/Genitourinary:  - neg Renal ROS     Endo:  - neg endo ROS     Psychiatric/Substance Use:       Infectious Disease:  - neg infectious disease ROS     Malignancy:       Other:            Physical Exam    Airway        Mallampati: I   TM distance: > 3 FB   Neck ROM: full   Mouth opening: > 3 cm    Respiratory Devices and Support         Dental  no notable dental history         Cardiovascular          Rhythm and rate: regular and normal     Pulmonary   pulmonary exam normal        breath sounds clear to  auscultation           OUTSIDE LABS:  CBC:   Lab Results   Component Value Date    WBC 10.9 07/11/2020    WBC 8.2 07/09/2020    HGB 14.8 07/11/2020    HGB 13.7 07/09/2020    HCT 46.0 07/11/2020    HCT 41.2 07/09/2020     07/11/2020     07/09/2020     BMP:   Lab Results   Component Value Date     07/11/2020     05/19/2014    POTASSIUM 3.8 07/11/2020    POTASSIUM 3.7 05/19/2014    CHLORIDE 107 07/11/2020    CHLORIDE 106 05/19/2014    CO2 25 07/11/2020    CO2 23 05/19/2014    BUN 12 07/11/2020    BUN 30 (H) 05/19/2014    CR 0.93 07/11/2020    CR 0.83 05/19/2014     (H) 07/11/2020    GLC 80 06/15/2018     COAGS:   Lab Results   Component Value Date    PTT 26 07/11/2020    INR 1.04 07/11/2020     POC: No results found for: BGM, HCG, HCGS  HEPATIC:   Lab Results   Component Value Date    ALBUMIN 3.9 05/19/2014    PROTTOTAL 6.8 05/19/2014    ALT 55 05/19/2014    AST 23 05/19/2014    ALKPHOS 51 05/19/2014    BILITOTAL 0.8 05/19/2014     OTHER:   Lab Results   Component Value Date    PALMER 8.8 07/11/2020    LIPASE 140 05/19/2014    TSH 3.36 08/29/2013    CRP 6.9 05/26/2016    SED 11 05/26/2016       Anesthesia Plan    ASA Status:  2      Anesthesia Type: MAC.     - Reason for MAC: immobility needed   Induction: Intravenous.           Consents    Anesthesia Plan(s) and associated risks, benefits, and realistic alternatives discussed. Questions answered and patient/representative(s) expressed understanding.     - Discussed with:  Patient      - Extended Intubation/Ventilatory Support Discussed: No.      - Patient is DNR/DNI Status: No    Use of blood products discussed: No .     Postoperative Care            Comments:    Will attempt MRI with IV versed, will run propofol gtt for sedation if this is inadequate with native airway, discussed general anesthesia as backup. Patient in agreement with the plane            Caio Moore MD

## 2021-04-07 NOTE — ANESTHESIA CARE TRANSFER NOTE
Patient: Yvon Dunlap    Procedure(s):  ANESTHESIA OUT OF OR Magnetic resonance imaging cervical spine @1200    Diagnosis: Right cervical radiculopathy [M54.12]  Diagnosis Additional Information: No value filed.    Anesthesia Type:   No value filed.     Note:    Oropharynx: spontaneously breathing  Level of Consciousness: awake  Oxygen Supplementation: room air    Independent Airway: airway patency satisfactory and stable  Dentition: dentition unchanged  Vital Signs Stable: post-procedure vital signs reviewed and stable  Report to RN Given: handoff report given  Patient transferred to: PACU    Handoff Report: Identifed the Patient, Identified the Reponsible Provider, Reviewed the pertinent medical history, Discussed the surgical course, Reviewed Intra-OP anesthesia mangement and issues during anesthesia, Set expectations for post-procedure period and Allowed opportunity for questions and acknowledgement of understanding      Vitals: (Last set prior to Anesthesia Care Transfer)  CRNA VITALS  4/7/2021 1206 - 4/7/2021 1248      4/7/2021             NIBP:  (!) 150/100    Pulse:  80    Temp:  36.6  C (97.8  F)    SpO2:  96 %    Resp Rate (observed):  16        Electronically Signed By: LILLY Benjamin CRNA  April 7, 2021  12:48 PM

## 2021-04-07 NOTE — ANESTHESIA POSTPROCEDURE EVALUATION
Patient: Yvon Dunlap    Procedure(s):  ANESTHESIA OUT OF OR Magnetic resonance imaging cervical spine @1200    Diagnosis:Right cervical radiculopathy [M54.12]  Diagnosis Additional Information: No value filed.    Anesthesia Type:  MAC    Note:  Disposition: Outpatient   Postop Pain Control: Uneventful            Sign Out: Well controlled pain   PONV: No   Neuro/Psych: Uneventful            Sign Out: Acceptable/Baseline neuro status   Airway/Respiratory: Uneventful            Sign Out: Acceptable/Baseline resp. status   CV/Hemodynamics: Uneventful            Sign Out: Acceptable CV status   Other NRE: NONE   DID A NON-ROUTINE EVENT OCCUR? No         Last vitals:  Vitals:    04/07/21 1250 04/07/21 1305 04/07/21 1320   BP: (!) 142/95 (!) 139/94 (!) 128/91   Pulse: 75 78 77   Resp:  18 20   Temp:      SpO2: 96% 96% 95%       Last vitals prior to Anesthesia Care Transfer:  CRNA VITALS  4/7/2021 1206 - 4/7/2021 1306      4/7/2021             NIBP:  (!) 150/100    Pulse:  80    Temp:  36.6  C (97.8  F)    SpO2:  96 %    Resp Rate (observed):  16          Electronically Signed By: Caio Moore MD  April 7, 2021  2:08 PM

## 2021-04-26 ENCOUNTER — OFFICE VISIT (OUTPATIENT)
Dept: ORTHOPEDICS | Facility: CLINIC | Age: 51
End: 2021-04-26
Attending: PHYSICIAN ASSISTANT
Payer: COMMERCIAL

## 2021-04-26 VITALS
SYSTOLIC BLOOD PRESSURE: 149 MMHG | RESPIRATION RATE: 16 BRPM | DIASTOLIC BLOOD PRESSURE: 99 MMHG | HEART RATE: 94 BPM | BODY MASS INDEX: 32.21 KG/M2 | WEIGHT: 225 LBS | HEIGHT: 70 IN

## 2021-04-26 DIAGNOSIS — F40.240 CLAUSTROPHOBIA: ICD-10-CM

## 2021-04-26 DIAGNOSIS — M75.101 ROTATOR CUFF SYNDROME OF RIGHT SHOULDER: ICD-10-CM

## 2021-04-26 DIAGNOSIS — M25.511 RIGHT SHOULDER PAIN, UNSPECIFIED CHRONICITY: ICD-10-CM

## 2021-04-26 DIAGNOSIS — M75.01 ADHESIVE CAPSULITIS OF RIGHT SHOULDER: ICD-10-CM

## 2021-04-26 PROCEDURE — 99244 OFF/OP CNSLTJ NEW/EST MOD 40: CPT | Performed by: ORTHOPAEDIC SURGERY

## 2021-04-26 ASSESSMENT — MIFFLIN-ST. JEOR: SCORE: 1886.84

## 2021-04-26 NOTE — PATIENT INSTRUCTIONS
Yvon to follow up with Primary Care provider regarding elevated blood pressure.    Dr. Kaiser has ordered an MRI on your right shoulder.     Please schedule a return appointment to discuss the MRI results in clinic.    Depending on the result we may choose to have Physical Therapy for strengthening, steroid injections, possible surgery or other treatment.

## 2021-04-28 RX ORDER — DIAZEPAM 5 MG
5 TABLET ORAL SEE ADMIN INSTRUCTIONS
Qty: 2 TABLET | Refills: 0 | Status: ON HOLD | OUTPATIENT
Start: 2021-04-28 | End: 2021-07-19

## 2021-04-28 NOTE — PROGRESS NOTES
Yvon Randall is a 50 year old male who is seen in consultation at the request of Lio Huizar PA-C  for right shoulder and arm pain.  Pain started in December without history of injury that he recalls.  It just seemed to come on after sleeping.  He indicates pain along the right side of the neck across the shoulder into the upper arm.  He does not have radiation down into the forearm.  He has dull constant pain rated 1-2 out of 10.  It gets worse if he raises arm above 90 degrees and works too much with his right arm.  It seems to vary if he sleeps in a different position.  He has been through physical therapy.      He has had cervical MRI which showed primarily stenosis on the left side, not so much on the right.  X-ray of the right shoulder showed mild spurring of the acromioclavicular joint otherwise unremarkable.    Past Medical History:   Diagnosis Date     Adhesive capsulitis of right shoulder 4/26/2021     ALLERGIC RHINITIS NOS 5/8/2006     Depressive disorder Sometime in the 1990s, I think     Depressive disorder, not elsewhere classified      Family history of colon cancer      Other and unspecified hyperlipidemia        Past Surgical History:   Procedure Laterality Date     ANESTHESIA OUT OF OR MRI N/A 4/7/2021    Procedure: ANESTHESIA OUT OF OR Magnetic resonance imaging cervical spine @1200;  Surgeon: GENERIC ANESTHESIA PROVIDER;  Location: [facility] OR     COLONOSCOPY  05/2016     HERNIA REPAIR  2008 or 2009       Family History   Problem Relation Age of Onset     Cancer - colorectal Father      Prostate Cancer Father      Heart Disease Father         heart attack     Colon Cancer Father      Breast Cancer Sister      Diabetes No family hx of      C.A.D. 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   Social Needs     Financial resource strain: Not on file     Food insecurity     Worry: Not on file     Inability: Not on file     Transportation needs     Medical: Not on file     Non-medical: Not on file   Tobacco Use     Smoking status: Never Smoker     Smokeless tobacco: Never Used   Substance and Sexual Activity     Alcohol use: Yes     Comment: moderate, 2 beers per day     Drug use: No     Sexual activity: Yes     Partners: Female     Birth control/protection: Pill   Lifestyle     Physical activity     Days per week: Not on file     Minutes per session: Not on file     Stress: Not on file   Relationships     Social connections     Talks on phone: Not on file     Gets together: Not on file     Attends Je[employer]'s witness service: Not on file     Active member of club or organization: Not on file     Attends meetings of clubs or organizations: Not on file     Relationship status: Not on file     Intimate partner violence     Fear of current or ex partner: Not on file     Emotionally abused: Not on file     Physically abused: Not on file     Forced sexual activity: Not on file   Other Topics Concern     Parent/sibling w/ CABG, MI or angioplasty before 65F 55M? No   Social History Narrative     Not on file       Current Outpatient Medications   Medication Sig Dispense Refill     buPROPion (WELLBUTRIN XL) 150 MG 24 hr tablet Take 1 tablet (150 mg) by mouth every morning (Take in addition to 300 mg dose) 90 tablet 3     CLARITIN 10 MG OR TABS 1 TABLET DAILY prn for allergies       gabapentin (NEURONTIN) 300 MG capsule TAKE 1-2 CAPSULES (300-600 MG) BY MOUTH NIGHTLY AS NEEDED (PAIN AT BEDTIME) 180 capsule 0     IBUPROFEN 600 MG OR TABS 1 tab prn for pain 0 0     Multiple Vitamin (DAILY MULTIVITAMIN PO) Take  by mouth daily.       Omega-3 Fatty Acids (FISH OIL PO) Take by mouth daily         "phenylephrine (RONY-SYNEPHRINE) 0.25 % nasal spray Spray 1 spray into both nostrils every 4 hours as needed for congestion or other (Nose bleed) 15 mL 0     polyethylene glycol (MIRALAX) 17 GM/SCOOP powder Take 17 g (1 capful) by mouth daily 507 g 0     propranolol (INDERAL) 20 MG tablet TAKE 1 TO 2 TABLETS, 30 MINUTES BEFORE ANXIETY INDUCING EVENT 90 tablet 0     Pseudoeph-Doxylamine-DM-APAP (NYQUIL PO) Take by mouth daily as needed        sildenafil (VIAGRA) 100 MG tablet Take 1 tablet (100 mg) by mouth daily as needed TAKE 1 TABLET BY MOUTH DAILY AS NEEDED. TAKE 30 MINS-4 HRS PRIOR TO INTERCOURSE 12 tablet 3     triamcinolone (KENALOG) 0.1 % external cream Apply topically 2 times daily as needed for irritation Apply sparingly to affected area three times daily as needed 45 g 1     TYLENOL 325 MG OR TABS ONE TO TWO TABLETS EVERY 4 TO 6 HOURS AS NEEDED FOR PAIN 100 0     UNABLE TO FIND MEDICATION NAME: Zinc         No Known Allergies    REVIEW OF SYSTEMS:  CONSTITUTIONAL:  NEGATIVE for fever, chills, change in weight, not feeling tired  SKIN:  NEGATIVE for worrisome rashes, no skin lumps, no skin ulcers and no non-healing wounds  EYES:  NEGATIVE for vision changes or irritation.  ENT/MOUTH:  NEGATIVE.  No hearing loss, no hoarseness, no difficulty swallowing.  RESP:  NEGATIVE. No cough or shortness of breath.  CV:  NEGATIVE for chest pain, palpitations or peripheral edema  GI:  NEGATIVE for nausea, abdominal pain, heartburn, or change in bowel habits  :  Negative. No dysuria, no hematuria  MUSCULOSKELETAL:  See HPI above  NEURO:  NEGATIVE . No headaches, no dizziness,  no numbness  ENDOCRINE:  NEGATIVE for temperature intolerance, skin/hair changes  HEME/ALLERGY/IMMUNE:  NEGATIVE for bleeding problems  PSYCHIATRIC:  NEGATIVE. no anxiety, no depression.     Exam:  Vitals: BP (!) 149/99   Pulse 94   Resp 16   Ht 1.778 m (5' 10\")   Wt 102.1 kg (225 lb)   BMI 32.28 kg/m    BMI= Body mass index is 32.28 " kg/m .  Constitutional:  healthy, alert and no distress  Neuro: Alert and Oriented x 3, Sensation grossly WNL.  Psych: Affect normal   Respiratory: Breathing not labored.  Cardiovascular: normal peripheral pulses  Lymph: no adenopathy  Skin: No rashes,worrisome lesions or skin problems  He has full range of motion of his neck without significant pain.  He has decreased motion of the right shoulder especially in rotation.  He has external rotation to only about 40 degrees, internal rotation to 40 degrees.  He has flexion to 120 degrees with pain.  His motion had been much better in February in physical therapy.  He had no significant pain with resisted internal rotation.  He did have some pain with resisted external rotation and abduction.  He has difficulty getting into position for blocking test or empty can test on the right.  Sensation, motor and circulation are intact.      Assessment:  Right shoulder adhesive capsulitis.  Possible rotator cuff injury.    Plan: We will proceed with MRI scan of the right shoulder to rule out rotator cuff tear.  If it is not torn he will need to work aggressively on range of motion to the shoulder.  Steroid injection may help with that.

## 2021-05-18 ENCOUNTER — TELEPHONE (OUTPATIENT)
Dept: ORTHOPEDICS | Facility: CLINIC | Age: 51
End: 2021-05-18

## 2021-05-18 DIAGNOSIS — M75.101 ROTATOR CUFF SYNDROME OF RIGHT SHOULDER: Primary | ICD-10-CM

## 2021-05-18 DIAGNOSIS — F40.240 CLAUSTROPHOBIA: ICD-10-CM

## 2021-05-18 NOTE — TELEPHONE ENCOUNTER
Called patient to ask question regarding MRI order with sedation and provide patient with the Imaging Scheduling phone number for [facility]. I let the patient know that the MR order has been placed and is awaiting signature of Dr. Vadim Kaiser.    Jillian Lama, ATC  Certified Athletic Trainer

## 2021-06-08 NOTE — ED AVS SNAPSHOT
[facility], [place], Emergency Department  [address]  [facility] [address]  Phone:  [phone]  Fax:  [phone]                                    Yvon Dunlap   MRN: [id number]    Department:  [facility], Emergency Department   Date of Visit:  7/9/2020           After Visit Summary Signature Page    I have received my discharge instructions, and my questions have been answered. I have discussed any challenges I see with this plan with the nurse or doctor.    ..........................................................................................................................................  Patient/Patient Representative Signature      ..........................................................................................................................................  Patient Representative Print Name and Relationship to Patient    ..................................................               ................................................  Date                                   Time    ..........................................................................................................................................  Reviewed by Signature/Title    ...................................................              ..............................................  Date                                               Time          22EPIC Rev 08/18        Carac Pregnancy And Lactation Text: This medication is Pregnancy Category X and contraindicated in pregnancy and in women who may become pregnant. It is unknown if this medication is excreted in breast milk.

## 2021-06-21 DIAGNOSIS — Z11.59 ENCOUNTER FOR SCREENING FOR OTHER VIRAL DISEASES: ICD-10-CM

## 2021-07-12 ENCOUNTER — OFFICE VISIT (OUTPATIENT)
Dept: FAMILY MEDICINE | Facility: CLINIC | Age: 51
End: 2021-07-12
Payer: COMMERCIAL

## 2021-07-12 VITALS
WEIGHT: 230 LBS | HEIGHT: 70 IN | SYSTOLIC BLOOD PRESSURE: 120 MMHG | HEART RATE: 88 BPM | TEMPERATURE: 98.3 F | BODY MASS INDEX: 32.93 KG/M2 | OXYGEN SATURATION: 97 % | DIASTOLIC BLOOD PRESSURE: 86 MMHG

## 2021-07-12 DIAGNOSIS — F40.240 CLAUSTROPHOBIA: ICD-10-CM

## 2021-07-12 DIAGNOSIS — M75.01 ADHESIVE CAPSULITIS OF RIGHT SHOULDER: ICD-10-CM

## 2021-07-12 DIAGNOSIS — Z01.818 PREOP GENERAL PHYSICAL EXAM: Primary | ICD-10-CM

## 2021-07-12 DIAGNOSIS — H43.392 VITREOUS FLOATERS OF LEFT EYE: ICD-10-CM

## 2021-07-12 PROCEDURE — 99214 OFFICE O/P EST MOD 30 MIN: CPT | Performed by: PHYSICIAN ASSISTANT

## 2021-07-12 RX ORDER — DIAZEPAM 5 MG
5 TABLET ORAL SEE ADMIN INSTRUCTIONS
Qty: 2 TABLET | Refills: 0 | Status: CANCELLED | OUTPATIENT
Start: 2021-07-12

## 2021-07-12 ASSESSMENT — MIFFLIN-ST. JEOR: SCORE: 1909.52

## 2021-07-12 NOTE — PROGRESS NOTES
[address] [address]  Phone: [phone]  Primary Provider: Pierre Huizar  Pre-op Performing Provider: PIERRE HUIZAR    PREOPERATIVE EVALUATION:  Today's date: 7/12/2021    Yvon Dunlap is a 50 year old male who presents for a preoperative evaluation.    Surgical Information:  Surgery/Procedure: Magnetic Resonance Imaging of right shoulder without contrast@1400  Surgery Location: [facility] OR  Surgeon: TBD  Surgery Date: 07/19/2021  Time of Surgery: 2:00 PM  Where patient plans to recover: At home with family  Fax number for surgical facility: Note does not need to be faxed, will be available electronically in Epic.    Type of Anesthesia Anticipated: IV sedation     Assessment & Plan     The proposed surgical procedure is considered LOW risk.    Preop general physical exam  Cleared     Claustrophobia  IV sedation worked well last time.     Adhesive capsulitis of right shoulder      Vitreous floaters of left eye  New onset. No vision changes. Recommend seeing eye clinic     RECOMMENDATION:  APPROVAL GIVEN to proceed with proposed procedure, without further diagnostic evaluation.      Subjective     HPI related to upcoming procedure: Right shoulder MRI. Extreme  Claustrophobia. Requiring IV Sedation.       Preop Questions 7/12/2021   1. Have you ever had a heart attack or stroke? No   2. Have you ever had surgery on your heart or blood vessels, such as a stent placement, a coronary artery bypass, or surgery on an artery in your head, neck, heart, or legs? No   3. Do you have chest pain with activity? No   4. Do you have a history of  heart failure? No   5. Do you currently have a cold, bronchitis or symptoms of other infection? No   6. Do you have a cough, shortness of breath, or wheezing? No   7. Do you or anyone in your family have previous history of blood clots? No   8. Do you or does anyone in your family have a serious bleeding  problem such as prolonged bleeding following surgeries or cuts? No   9. Have you ever had problems with anemia or been told to take iron pills? YES - History of due to chronic nosebleeds.   Lab Results   Component Value Date    HGB 14.8 07/11/2020         10. Have you had any abnormal blood loss such as black, tarry or bloody stools? No   11. Have you ever had a blood transfusion? UNKNOWN - Not sure    12. Are you willing to have a blood transfusion if it is medically needed before, during, or after your surgery? Yes   13. Have you or any of your relatives ever had problems with anesthesia? No   14. Do you have sleep apnea, excessive snoring or daytime drowsiness? UNKNOWN - Does snore. Does not have known apnea    15. Do you have any artifical heart valves or other implanted medical devices like a pacemaker, defibrillator, or continuous glucose monitor? No   16. Do you have artificial joints? No   17. Are you allergic to latex? No     Health Care Directive:  Patient does not have a Health Care Directive or Living Will: Discussed advance care planning with patient; however, patient declined at this time.    Preoperative Review of :   reviewed - no record of controlled substances prescribed.    Review of Systems  Constitutional, neuro, ENT, endocrine, pulmonary, cardiac, gastrointestinal, genitourinary, musculoskeletal, integument and psychiatric systems are negative, except as otherwise noted.    Patient Active Problem List    Diagnosis Date Noted     Rotator cuff syndrome of right shoulder 04/26/2021     Priority: Medium     Adhesive capsulitis of right shoulder 04/26/2021     Priority: Medium     Claustrophobia 04/26/2021     Priority: Medium     Acute pain of right shoulder 12/23/2020     Priority: Medium     Numbness and tingling of right arm 12/23/2020     Priority: Medium     Epistaxis 05/20/2014     Priority: Medium     Do you wish to do the replacement in the background? yes         Dysthymic disorder  09/26/2013     Priority: Medium     Family history of colon cancer 08/29/2013     Priority: Medium     Father - Uncertain on age - possibly mid 50s - patient will let me know and we will screen accordingly        Family history of prostate cancer 08/29/2013     Priority: Medium     Father - uncertain age        Erectile dysfunction 06/29/2012     Priority: Medium     HYPERLIPIDEMIA LDL GOAL <160 05/09/2010     Priority: Medium     Allergic rhinitis 05/08/2006     Priority: Medium     Problem list name updated by automated process. Provider to review        Past Medical History:   Diagnosis Date     Adhesive capsulitis of right shoulder 4/26/2021     ALLERGIC RHINITIS NOS 5/8/2006     Depressive disorder Sometime in the 1990s, I think     Depressive disorder, not elsewhere classified      Family history of colon cancer      Other and unspecified hyperlipidemia      Past Surgical History:   Procedure Laterality Date     ANESTHESIA OUT OF OR MRI N/A 4/7/2021    Procedure: ANESTHESIA OUT OF OR Magnetic resonance imaging cervical spine @1200;  Surgeon: GENERIC ANESTHESIA PROVIDER;  Location: [facility] OR     COLONOSCOPY  05/2016     HERNIA REPAIR  2008 or 2009     Current Outpatient Medications   Medication Sig Dispense Refill     buPROPion (WELLBUTRIN XL) 150 MG 24 hr tablet Take 1 tablet (150 mg) by mouth every morning (Take in addition to 300 mg dose) 90 tablet 3     CLARITIN 10 MG OR TABS 1 TABLET DAILY prn for allergies       diazepam (VALIUM) 5 MG tablet Take 1 tablet (5 mg) by mouth See Admin Instructions 1 hour before procedure.  Repeat in 30 minutes if needed. 2 tablet 0     gabapentin (NEURONTIN) 300 MG capsule TAKE 1-2 CAPSULES (300-600 MG) BY MOUTH NIGHTLY AS NEEDED (PAIN AT BEDTIME) 180 capsule 0     IBUPROFEN 600 MG OR TABS 1 tab prn for pain 0 0     Multiple Vitamin (DAILY MULTIVITAMIN PO) Take  by mouth daily.       Omega-3 Fatty Acids (FISH OIL PO) Take by mouth daily        phenylephrine (RONY-SYNEPHRINE) 0.25  "% nasal spray Spray 1 spray into both nostrils every 4 hours as needed for congestion or other (Nose bleed) 15 mL 0     polyethylene glycol (MIRALAX) 17 GM/SCOOP powder Take 17 g (1 capful) by mouth daily 507 g 0     propranolol (INDERAL) 20 MG tablet TAKE 1 TO 2 TABLETS, 30 MINUTES BEFORE ANXIETY INDUCING EVENT 90 tablet 0     Pseudoeph-Doxylamine-DM-APAP (NYQUIL PO) Take by mouth daily as needed        sildenafil (VIAGRA) 100 MG tablet Take 1 tablet (100 mg) by mouth daily as needed TAKE 1 TABLET BY MOUTH DAILY AS NEEDED. TAKE 30 MINS-4 HRS PRIOR TO INTERCOURSE 12 tablet 3     triamcinolone (KENALOG) 0.1 % external cream Apply topically 2 times daily as needed for irritation Apply sparingly to affected area three times daily as needed 45 g 1     TYLENOL 325 MG OR TABS ONE TO TWO TABLETS EVERY 4 TO 6 HOURS AS NEEDED FOR PAIN 100 0     UNABLE TO FIND MEDICATION NAME: Zinc         No Known Allergies     Social History     Tobacco Use     Smoking status: Never Smoker     Smokeless tobacco: Never Used   Substance Use Topics     Alcohol use: Yes     Comment: moderate, 2 beers per day     Family History   Problem Relation Age of Onset     Cancer - colorectal Father      Prostate Cancer Father      Heart Disease Father         heart attack     Colon Cancer Father      Breast Cancer Sister      Diabetes No family hx of      C.A.D. No family hx of      Hypertension No family hx of      Cerebrovascular Disease No family hx of      History   Drug Use No         Objective     Pulse 88   Temp 98.3  F (36.8  C) (Oral)   Ht 1.778 m (5' 10\")   Wt 104.3 kg (230 lb)   SpO2 97%   BMI 33.00 kg/m      Physical Exam    GENERAL APPEARANCE: healthy, alert and no distress     EYES: EOMI,  PERRL     HENT: ear canals and TM's normal and nose and mouth without ulcers or lesions     NECK: no adenopathy, no asymmetry, masses, or scars and thyroid normal to palpation     RESP: lungs clear to auscultation - no rales, rhonchi or wheezes     " CV: regular rates and rhythm, normal S1 S2, no S3 or S4 and no murmur, click or rub     ABDOMEN:  soft, nontender, no HSM or masses and bowel sounds normal     MS: extremities normal- no gross deformities noted, no evidence of inflammation in joints, FROM in all extremities.     SKIN: no suspicious lesions or rashes     NEURO: Normal strength and tone, sensory exam grossly normal, mentation intact and speech normal     PSYCH: mentation appears normal. and affect normal/bright     LYMPHATICS: No cervical adenopathy    Recent Labs   Lab Test 07/11/20  0023 07/09/20  0218   HGB 14.8 13.7    221   INR 1.04  --      --    POTASSIUM 3.8  --    CR 0.93  --         Diagnostics:  No labs were ordered during this visit.   No EKG required for low risk surgery (cataract, skin procedure, breast biopsy, etc).    Revised Cardiac Risk Index (RCRI):  The patient has the following serious cardiovascular risks for perioperative complications:   - No serious cardiac risks = 0 points     RCRI Interpretation: 0 points: Class I (very low risk - 0.4% complication rate)         Signed Electronically by: PIERRE HARDWICK PA-C  Copy of this evaluation report is provided to requesting physician.

## 2021-07-12 NOTE — PATIENT INSTRUCTIONS

## 2021-07-15 ENCOUNTER — LAB (OUTPATIENT)
Dept: LAB | Facility: CLINIC | Age: 51
End: 2021-07-15

## 2021-07-15 DIAGNOSIS — Z11.59 ENCOUNTER FOR SCREENING FOR OTHER VIRAL DISEASES: ICD-10-CM

## 2021-07-15 LAB — SARS-COV-2 RNA RESP QL NAA+PROBE: NEGATIVE

## 2021-07-15 PROCEDURE — U0005 INFEC AGEN DETEC AMPLI PROBE: HCPCS | Mod: 90 | Performed by: PATHOLOGY

## 2021-07-15 PROCEDURE — U0003 INFECTIOUS AGENT DETECTION BY NUCLEIC ACID (DNA OR RNA); SEVERE ACUTE RESPIRATORY SYNDROME CORONAVIRUS 2 (SARS-COV-2) (CORONAVIRUS DISEASE [COVID-19]), AMPLIFIED PROBE TECHNIQUE, MAKING USE OF HIGH THROUGHPUT TECHNOLOGIES AS DESCRIBED BY CMS-2020-01-R: HCPCS | Mod: 90 | Performed by: PATHOLOGY

## 2021-07-17 ENCOUNTER — ANESTHESIA EVENT (OUTPATIENT)
Dept: SURGERY | Facility: CLINIC | Age: 51
End: 2021-07-17
Payer: COMMERCIAL

## 2021-07-19 ENCOUNTER — HOSPITAL ENCOUNTER (OUTPATIENT)
Dept: MRI IMAGING | Facility: CLINIC | Age: 51
End: 2021-07-19
Attending: ORTHOPAEDIC SURGERY
Payer: COMMERCIAL

## 2021-07-19 ENCOUNTER — HOSPITAL ENCOUNTER (OUTPATIENT)
Facility: CLINIC | Age: 51
Discharge: HOME | End: 2021-07-19
Attending: ORTHOPAEDIC SURGERY | Admitting: ORTHOPAEDIC SURGERY
Payer: COMMERCIAL

## 2021-07-19 ENCOUNTER — ANESTHESIA (OUTPATIENT)
Dept: SURGERY | Facility: CLINIC | Age: 51
End: 2021-07-19
Payer: COMMERCIAL

## 2021-07-19 VITALS
WEIGHT: 229.72 LBS | SYSTOLIC BLOOD PRESSURE: 137 MMHG | DIASTOLIC BLOOD PRESSURE: 93 MMHG | TEMPERATURE: 97.1 F | BODY MASS INDEX: 32.89 KG/M2 | HEIGHT: 70 IN | OXYGEN SATURATION: 96 % | HEART RATE: 76 BPM | RESPIRATION RATE: 16 BRPM

## 2021-07-19 DIAGNOSIS — M75.101 ROTATOR CUFF SYNDROME OF RIGHT SHOULDER: ICD-10-CM

## 2021-07-19 DIAGNOSIS — F40.240 CLAUSTROPHOBIA: ICD-10-CM

## 2021-07-19 LAB — GLUCOSE BLDC GLUCOMTR-MCNC: 120 MG/DL (ref 70–99)

## 2021-07-19 PROCEDURE — 73221 MRI JOINT UPR EXTREM W/O DYE: CPT | Mod: RT

## 2021-07-19 PROCEDURE — 710N000012 HC RECOVERY PHASE 2, PER MINUTE

## 2021-07-19 PROCEDURE — 258N000003 HC RX IP 258 OP 636: Performed by: NURSE ANESTHETIST, CERTIFIED REGISTERED

## 2021-07-19 PROCEDURE — 370N000017 HC ANESTHESIA TECHNICAL FEE, PER MIN

## 2021-07-19 PROCEDURE — 73221 MRI JOINT UPR EXTREM W/O DYE: CPT | Mod: 26 | Performed by: RADIOLOGY

## 2021-07-19 PROCEDURE — 250N000011 HC RX IP 250 OP 636: Performed by: NURSE ANESTHETIST, CERTIFIED REGISTERED

## 2021-07-19 PROCEDURE — 999N000141 HC STATISTIC PRE-PROCEDURE NURSING ASSESSMENT

## 2021-07-19 RX ORDER — ONDANSETRON 2 MG/ML
4 INJECTION INTRAMUSCULAR; INTRAVENOUS EVERY 30 MIN PRN
Status: DISCONTINUED | OUTPATIENT
Start: 2021-07-19 | End: 2021-07-19 | Stop reason: HOSPADM

## 2021-07-19 RX ORDER — LIDOCAINE 40 MG/G
CREAM TOPICAL
Status: DISCONTINUED | OUTPATIENT
Start: 2021-07-19 | End: 2021-07-19 | Stop reason: HOSPADM

## 2021-07-19 RX ORDER — MEPERIDINE HYDROCHLORIDE 25 MG/ML
12.5 INJECTION INTRAMUSCULAR; INTRAVENOUS; SUBCUTANEOUS
Status: DISCONTINUED | OUTPATIENT
Start: 2021-07-19 | End: 2021-07-19 | Stop reason: HOSPADM

## 2021-07-19 RX ORDER — ONDANSETRON 4 MG/1
4 TABLET, ORALLY DISINTEGRATING ORAL EVERY 30 MIN PRN
Status: DISCONTINUED | OUTPATIENT
Start: 2021-07-19 | End: 2021-07-19 | Stop reason: HOSPADM

## 2021-07-19 RX ORDER — SODIUM CHLORIDE, SODIUM LACTATE, POTASSIUM CHLORIDE, CALCIUM CHLORIDE 600; 310; 30; 20 MG/100ML; MG/100ML; MG/100ML; MG/100ML
INJECTION, SOLUTION INTRAVENOUS CONTINUOUS
Status: DISCONTINUED | OUTPATIENT
Start: 2021-07-19 | End: 2021-07-19 | Stop reason: HOSPADM

## 2021-07-19 RX ORDER — SODIUM CHLORIDE, SODIUM LACTATE, POTASSIUM CHLORIDE, CALCIUM CHLORIDE 600; 310; 30; 20 MG/100ML; MG/100ML; MG/100ML; MG/100ML
INJECTION, SOLUTION INTRAVENOUS CONTINUOUS PRN
Status: DISCONTINUED | OUTPATIENT
Start: 2021-07-19 | End: 2021-07-19

## 2021-07-19 RX ADMIN — MIDAZOLAM 2 MG: 1 INJECTION INTRAMUSCULAR; INTRAVENOUS at 14:05

## 2021-07-19 RX ADMIN — MIDAZOLAM 2 MG: 1 INJECTION INTRAMUSCULAR; INTRAVENOUS at 14:19

## 2021-07-19 RX ADMIN — SODIUM CHLORIDE, POTASSIUM CHLORIDE, SODIUM LACTATE AND CALCIUM CHLORIDE: 600; 310; 30; 20 INJECTION, SOLUTION INTRAVENOUS at 14:03

## 2021-07-19 ASSESSMENT — MIFFLIN-ST. JEOR: SCORE: 1908.25

## 2021-07-19 NOTE — ANESTHESIA PREPROCEDURE EVALUATION
Anesthesia Pre-Procedure Evaluation    Patient: Yvon Dunlap   MRN: [id number] : 1970        Preoperative Diagnosis: Rotator cuff syndrome, right [M75.101]  Claustrophobia [F40.240]   Procedure : Procedure(s):  Magnetic Resonance Imaging of  right shoulder without contrast@1400     Past Medical History:   Diagnosis Date     Adhesive capsulitis of right shoulder 2021     ALLERGIC RHINITIS NOS 2006     Depressive disorder Sometime in the 1990s, I think     Depressive disorder, not elsewhere classified      Family history of colon cancer      Other and unspecified hyperlipidemia       Past Surgical History:   Procedure Laterality Date     ANESTHESIA OUT OF OR MRI N/A 2021    Procedure: ANESTHESIA OUT OF OR Magnetic resonance imaging cervical spine @1200;  Surgeon: GENERIC ANESTHESIA PROVIDER;  Location: [facility] OR     COLONOSCOPY  2016     HERNIA REPAIR   or       No Known Allergies   Social History     Tobacco Use     Smoking status: Never Smoker     Smokeless tobacco: Never Used   Substance Use Topics     Alcohol use: Yes     Comment: moderate, 2 beers per day      Wt Readings from Last 1 Encounters:   21 104.2 kg (229 lb 11.5 oz)        Anesthesia Evaluation            ROS/MED HX  ENT/Pulmonary:       Neurologic:       Cardiovascular:       METS/Exercise Tolerance:     Hematologic:       Musculoskeletal:       GI/Hepatic:       Renal/Genitourinary:       Endo:       Psychiatric/Substance Use:     (+) psychiatric history anxiety     Infectious Disease:       Malignancy:       Other:            Physical Exam    Airway  airway exam normal      Mallampati: III       Respiratory Devices and Support         Dental  no notable dental history         Cardiovascular             Pulmonary                   OUTSIDE LABS:  CBC:   Lab Results   Component Value Date    WBC 10.9 2020    WBC 8.2 2020    HGB 14.8 2020    HGB 13.7 2020    HCT 46.0 2020    HCT 41.2  07/09/2020     07/11/2020     07/09/2020     BMP:   Lab Results   Component Value Date     07/11/2020     05/19/2014    POTASSIUM 3.8 07/11/2020    POTASSIUM 3.7 05/19/2014    CHLORIDE 107 07/11/2020    CHLORIDE 106 05/19/2014    CO2 25 07/11/2020    CO2 23 05/19/2014    BUN 12 07/11/2020    BUN 30 (H) 05/19/2014    CR 0.93 07/11/2020    CR 0.83 05/19/2014     (H) 07/19/2021     (H) 07/11/2020     COAGS:   Lab Results   Component Value Date    PTT 26 07/11/2020    INR 1.04 07/11/2020     POC: No results found for: BGM, HCG, HCGS  HEPATIC:   Lab Results   Component Value Date    ALBUMIN 3.9 05/19/2014    PROTTOTAL 6.8 05/19/2014    ALT 55 05/19/2014    AST 23 05/19/2014    ALKPHOS 51 05/19/2014    BILITOTAL 0.8 05/19/2014     OTHER:   Lab Results   Component Value Date    PALMER 8.8 07/11/2020    LIPASE 140 05/19/2014    TSH 3.36 08/29/2013    CRP 6.9 05/26/2016    SED 11 05/26/2016       Anesthesia Plan    ASA Status:  2      Anesthesia Type: MAC.     - Reason for MAC: straight local not clinically adequate   Induction: N/a.   Maintenance: N/A.        Consents    Anesthesia Plan(s) and associated risks, benefits, and realistic alternatives discussed. Questions answered and patient/representative(s) expressed understanding.     - Discussed with:  Patient         Postoperative Care            Comments:                Ovi Green MD

## 2021-07-19 NOTE — ANESTHESIA POSTPROCEDURE EVALUATION
Patient: Yvon Dunlap    Procedure(s):  Magnetic Resonance Imaging of  right shoulder without contrast@1400    Diagnosis:Rotator cuff syndrome, right [M75.101]  Claustrophobia [F40.240]  Diagnosis Additional Information: No value filed.    Anesthesia Type:  MAC    Note:  Disposition: Outpatient   Postop Pain Control: Uneventful            Sign Out: Well controlled pain   PONV: No   Neuro/Psych: Uneventful            Sign Out: Acceptable/Baseline neuro status   Airway/Respiratory: Uneventful            Sign Out: Acceptable/Baseline resp. status   CV/Hemodynamics: Uneventful            Sign Out: Acceptable CV status; No obvious hypovolemia; No obvious fluid overload   Other NRE:    DID A NON-ROUTINE EVENT OCCUR?            Last vitals:  Vitals:    07/19/21 1313   BP: (!) 134/103   Pulse: 86   Resp: 15   Temp: 36.7  C (98.1  F)   SpO2: 99%       Last vitals prior to Anesthesia Care Transfer:  CRNA VITALS  7/19/2021 1413 - 7/19/2021 1453      7/19/2021             EKG:  Sinus rhythm          Electronically Signed By: Ovi Green MD  July 19, 2021  2:53 PM

## 2021-07-19 NOTE — DISCHARGE INSTRUCTIONS
[facility], [place]  Same-Day Surgery   Adult Discharge Orders & Instructions     For 24 hours after surgery    1. Get plenty of rest.  A responsible adult must stay with you for at least 24 hours after you leave the hospital.   2. Do not drive or use heavy equipment.  If you have weakness or tingling, don't drive or use heavy equipment until this feeling goes away.  3. Do not drink alcohol.  4. Avoid strenuous or risky activities.  Ask for help when climbing stairs.   5. You may feel lightheaded.  IF so, sit for a few minutes before standing.  Have someone help you get up.   6. If you have nausea (feel sick to your stomach): Drink only clear liquids such as apple juice, ginger ale, broth or 7-Up.  Rest may also help.  Be sure to drink enough fluids.  Move to a regular diet as you feel able.  7. You may have a slight fever. Call the doctor if your fever is over 100 F (37.7 C) (taken under the tongue) or lasts longer than 24 hours.  8. You may have a dry mouth, a sore throat, muscle aches or trouble sleeping.  These should go away after 24 hours.  9. Do not make important or legal decisions.   Call your doctor for any of the followin.  Signs of infection (fever, growing tenderness at the surgery site, a large amount of drainage or bleeding, severe pain, foul-smelling drainage, redness, swelling).    2. It has been over 8 to 10 hours since surgery and you are still not able to urinate (pass water).    3.  Headache for over 24 hours.        Emergency Department: [facility]: [phone]       (TTY for hearing impaired: [phone])

## 2021-07-19 NOTE — ANESTHESIA CARE TRANSFER NOTE
Patient: Yvon Dunlap    Procedure(s):  Magnetic Resonance Imaging of  right shoulder without contrast@1400    Diagnosis: Rotator cuff syndrome, right [M75.101]  Claustrophobia [F40.240]  Diagnosis Additional Information: No value filed.    Anesthesia Type:   MAC     Note:    Oropharynx: oropharynx clear of all foreign objects and spontaneously breathing  Level of Consciousness: awake  Oxygen Supplementation: room air    Independent Airway: airway patency satisfactory and stable  Dentition: dentition unchanged  Vital Signs Stable: post-procedure vital signs reviewed and stable  Report to RN Given: handoff report given  Patient transferred to: Phase II    Handoff Report: Identifed the Patient, Identified the Reponsible Provider, Reviewed the pertinent medical history, Discussed the surgical course, Reviewed Intra-OP anesthesia mangement and issues during anesthesia, Set expectations for post-procedure period and Allowed opportunity for questions and acknowledgement of understanding      Vitals: (Last set prior to Anesthesia Care Transfer)  CRNA VITALS  7/19/2021 1413 - 7/19/2021 1451      7/19/2021             NIBP:  127/64    Pulse:  74    SpO2:  96 %    Resp Rate (observed):  10        Electronically Signed By: LILLY Bill CRNA  July 19, 2021  2:51 PM

## 2021-08-09 ENCOUNTER — OFFICE VISIT (OUTPATIENT)
Dept: ORTHOPEDICS | Facility: CLINIC | Age: 51
End: 2021-08-09
Payer: COMMERCIAL

## 2021-08-09 VITALS
HEIGHT: 70 IN | BODY MASS INDEX: 32.73 KG/M2 | DIASTOLIC BLOOD PRESSURE: 93 MMHG | SYSTOLIC BLOOD PRESSURE: 138 MMHG | WEIGHT: 228.6 LBS | HEART RATE: 92 BPM

## 2021-08-09 DIAGNOSIS — M75.121 NONTRAUMATIC COMPLETE TEAR OF RIGHT ROTATOR CUFF: ICD-10-CM

## 2021-08-09 DIAGNOSIS — M19.011 ARTHRITIS OF RIGHT ACROMIOCLAVICULAR JOINT: ICD-10-CM

## 2021-08-09 PROBLEM — M75.101 ROTATOR CUFF SYNDROME OF RIGHT SHOULDER: Status: RESOLVED | Noted: 2021-04-26 | Resolved: 2021-08-09

## 2021-08-09 PROCEDURE — 99213 OFFICE O/P EST LOW 20 MIN: CPT | Performed by: ORTHOPAEDIC SURGERY

## 2021-08-09 ASSESSMENT — MIFFLIN-ST. JEOR: SCORE: 1899.2

## 2021-08-09 ASSESSMENT — PAIN SCALES - GENERAL: PAINLEVEL: MILD PAIN (2)

## 2021-08-09 NOTE — LETTER
8/9/2021         RE: Yvon Dunlap  [address] [address]        Dear Colleague,    Thank you for referring your patient, Yvon Dunlap, to the [facility]. Please see a copy of my visit note below.    Yvon Dunlap returns for his right shoulder MRI results.  MRI images were independently visualized with the patient.  These show full thicknessrotator cuff tear of supraspinatus , moderate acromio-clavicular hypertrophy, a type 2 acromion, no biceps tendonosis, possible  inferior glenoid labrum tear, no glenohumeral osteoarthritis.      Impression:  Right shoulder rotator cuff tear.  2. Acromio-clavicular osteoarthritis.  3. Adhesive capsulitis with persistent decreased internal rotation and external rotation.    We discussed options, risks, benefits of conservative and surgical treatment.    I recommend right rotator cuff repair, Zana Watkins.  Pain started in December, so may be difficult to get this repaired.  Risks, benefits, potential complications and alternatives were discussed.    Total time spent was 15 minutes; with 15 minutes spent face-to-face with patient discussing test results, treatment options, and estimated recovery time.              Again, thank you for allowing me to participate in the care of your patient.        Sincerely,        Vadim Kaiser MD

## 2021-08-09 NOTE — PROGRESS NOTES
Yvon Dunlap returns for his right shoulder MRI results.  MRI images were independently visualized with the patient.  These show full thicknessrotator cuff tear of supraspinatus , moderate acromio-clavicular hypertrophy, a type 2 acromion, no biceps tendonosis, possible  inferior glenoid labrum tear, no glenohumeral osteoarthritis.      Impression:  Right shoulder rotator cuff tear.  2. Acromio-clavicular osteoarthritis.  3. Adhesive capsulitis with persistent decreased internal rotation and external rotation.    We discussed options, risks, benefits of conservative and surgical treatment.    I recommend right rotator cuff repair, Zana Watkins.  Pain started in December, so may be difficult to get this repaired.  Risks, benefits, potential complications and alternatives were discussed.    Total time spent was 15 minutes; with 15 minutes spent face-to-face with patient discussing test results, treatment options, and estimated recovery time.

## 2021-08-09 NOTE — PATIENT INSTRUCTIONS
Rotator cuff repair is done in same day surgery.    Preop physical with primary physician is needed within 30 days of the surgery.  Nothing to eat or drink for 8 hours before surgery.  For same day surgery you need a ride.  Someone should stay with you for 12 hours afterward.  Stop blood thinners 5 days before surgery (aspirin, warfarin, anti-inflammatories).      General anesthesia is used.  They often perform a pain block at the neck to help with pain.  Sutures are in the wound.  Keep wound dry until clinic appointment at 1 1/2 weeks.     Physical Therapy will start after first clinic visit.  0-3 weeks.  Arm in sling or immobilizer.  No reaching with operative arm.   Okay to have arm out to dangle or bend elbow.    3-6 weeks.  Discontinue immobilizer.  Start reaching.  No lifting over 1 lb.  6-12 weeks  Work on strengthening.  1 year to full recovery.    You will need a Covid test before surgery.  They will call you to schedule that.    Surgery schedulers will call you to schedule surgery.  If you don't get a call in the next few days, then call [phone] to schedule your surgery for [place] or [phone] to schedule for [place]..

## 2021-08-12 ENCOUNTER — TELEPHONE (OUTPATIENT)
Dept: ORTHOPEDICS | Facility: CLINIC | Age: 51
End: 2021-08-12

## 2021-08-12 NOTE — TELEPHONE ENCOUNTER
Reason for Call:  Other call back    Detailed comments: Patient is calling to schedule surgery, orders are needed. Thank you     Phone Number Patient can be reached at: Cell number on file:    Telephone Information:   Mobile [phone]       Best Time: any    Can we leave a detailed message on this number? YES    Call taken on 8/12/2021 at 2:48 PM by Adele Mccollum

## 2021-08-13 ENCOUNTER — TELEPHONE (OUTPATIENT)
Dept: ORTHOPEDICS | Facility: CLINIC | Age: 51
End: 2021-08-13

## 2021-08-13 DIAGNOSIS — M19.011 ARTHRITIS OF RIGHT ACROMIOCLAVICULAR JOINT: ICD-10-CM

## 2021-08-13 DIAGNOSIS — M75.121 NONTRAUMATIC COMPLETE TEAR OF RIGHT ROTATOR CUFF: Primary | ICD-10-CM

## 2021-08-13 NOTE — TELEPHONE ENCOUNTER
Type of surgery: Arthrotomy shoulder with rotator cuff repair, acromioplasty, distal clavicle excision right   CPT: 45611, 65445, 43489   DIAGNOSIS: Nontraumatic complete tear of right rotator cuff M75.121     Arthritis of right acromioclavicular joint M19.011     [place] [facility] [place]  Date and time of surgery: 09/03/2021  Surgeon: Job  Pre-Op Appt Date: 08/31/2021  Post-Op Appt Date: 09/20/2021   Packet sent out: Yes  Pre-cert/Authorization completed:  No auth needed per [employer] [place] Prior Auth list  Date: 08/17/2021    Thank You,    Meng Staton  Prior Auth Department

## 2021-08-13 NOTE — TELEPHONE ENCOUNTER
Spoke with spouse discussed date (09/01/2021) she will have patient call to schedule. At [phone]

## 2021-08-16 NOTE — TELEPHONE ENCOUNTER
[address] [place] [address] OB GYN A department of [place] [address]  Phone: [phone]  Fax: [phone]    ORQUIDEA Jiang CNM        August 24, 2021    [address] [place] [address] Carissa Gaspar      Dear Cara Rodgers:    Please call our office ASAP to schedule your colposcopy appointment. It is very important for your overall health that we see you for this appointment. If you have any questions or concerns, please don't hesitate to call.     Sincerely,        ORQUIDEA Jiang CNM In reviewing the chart, patient has this prescription sent on 6/7/19 for 90 tablets. Too soon for a refill. Refusing this refill with a note to pharmacy.     Lin Fields RN

## 2021-08-31 ENCOUNTER — OFFICE VISIT (OUTPATIENT)
Dept: FAMILY MEDICINE | Facility: CLINIC | Age: 51
End: 2021-08-31
Payer: COMMERCIAL

## 2021-08-31 VITALS
OXYGEN SATURATION: 96 % | SYSTOLIC BLOOD PRESSURE: 130 MMHG | DIASTOLIC BLOOD PRESSURE: 86 MMHG | RESPIRATION RATE: 26 BRPM | HEIGHT: 70 IN | BODY MASS INDEX: 32.44 KG/M2 | HEART RATE: 90 BPM | WEIGHT: 226.6 LBS | TEMPERATURE: 98 F

## 2021-08-31 DIAGNOSIS — Z01.818 PREOP GENERAL PHYSICAL EXAM: Primary | ICD-10-CM

## 2021-08-31 DIAGNOSIS — M75.121 NONTRAUMATIC COMPLETE TEAR OF RIGHT ROTATOR CUFF: ICD-10-CM

## 2021-08-31 DIAGNOSIS — M19.011 ARTHRITIS OF RIGHT ACROMIOCLAVICULAR JOINT: ICD-10-CM

## 2021-08-31 PROCEDURE — U0003 INFECTIOUS AGENT DETECTION BY NUCLEIC ACID (DNA OR RNA); SEVERE ACUTE RESPIRATORY SYNDROME CORONAVIRUS 2 (SARS-COV-2) (CORONAVIRUS DISEASE [COVID-19]), AMPLIFIED PROBE TECHNIQUE, MAKING USE OF HIGH THROUGHPUT TECHNOLOGIES AS DESCRIBED BY CMS-2020-01-R: HCPCS | Performed by: FAMILY MEDICINE

## 2021-08-31 PROCEDURE — 99214 OFFICE O/P EST MOD 30 MIN: CPT | Performed by: FAMILY MEDICINE

## 2021-08-31 PROCEDURE — U0005 INFEC AGEN DETEC AMPLI PROBE: HCPCS | Performed by: FAMILY MEDICINE

## 2021-08-31 ASSESSMENT — MIFFLIN-ST. JEOR: SCORE: 1889.1

## 2021-08-31 ASSESSMENT — PAIN SCALES - GENERAL: PAINLEVEL: NO PAIN (0)

## 2021-08-31 ASSESSMENT — PATIENT HEALTH QUESTIONNAIRE - PHQ9: SUM OF ALL RESPONSES TO PHQ QUESTIONS 1-9: 2

## 2021-08-31 NOTE — PROGRESS NOTES
[address] [address]  Phone: [phone]  Primary Provider: Lio Huizar  Pre-op Performing Provider: IKE YAP    PREOPERATIVE EVALUATION:  Today's date: 8/31/2021    Yvon Dunlap is a 50 year old male who presents for a preoperative evaluation.    Surgical Information:  Surgery/Procedure: Arthrotomy, shoulder  [place] [facility] [place]: Dr. Kaiser  Surgery Date: 09/03/2021  Time of Surgery: 6AM  Where patient plans to recover: At home with family  Fax number for surgical facility: Note does not need to be faxed, will be available electronically in Epic.    Type of Anesthesia Anticipated: to be determined    Assessment & Plan     The proposed surgical procedure is considered INTERMEDIATE risk.    Preop general physical exam  Patient will get a Covid test today.  Advised patient to be n.p.o. after midnight morning for surgery.    Nontraumatic complete tear of right rotator cuff    - Asymptomatic COVID-19 Virus (Coronavirus) by PCR Nose    Arthritis of right acromioclavicular joint    - Asymptomatic COVID-19 Virus (Coronavirus) by PCR Nose         Risks and Recommendations:  The patient has the following additional risks and recommendations for perioperative complications:   - No identified additional risk factors other than previously addressed    Medication Instructions:  Patient is to take all scheduled medications on the day of surgery EXCEPT for modifications listed below:    RECOMMENDATION:  APPROVAL GIVEN to proceed with proposed procedure, without further diagnostic evaluation.      Subjective     HPI related to upcoming procedure: :  Chronic right shoulder rotator cuff injury.  Scheduled to have Arthrotomy.    Preop Questions 8/31/2021   1. Have you ever had a heart attack or stroke? No   2. Have you ever had surgery on your heart or blood vessels, such as a stent placement, a coronary artery bypass, or surgery on  an artery in your head, neck, heart, or legs? No   3. Do you have chest pain with activity? No   4. Do you have a history of  heart failure? No   5. Do you currently have a cold, bronchitis or symptoms of other infection? No   6. Do you have a cough, shortness of breath, or wheezing? No   7. Do you or anyone in your family have previous history of blood clots? No   8. Do you or does anyone in your family have a serious bleeding problem such as prolonged bleeding following surgeries or cuts? No   9. Have you ever had problems with anemia or been told to take iron pills? YES -    10. Have you had any abnormal blood loss such as black, tarry or bloody stools? No   11. Have you ever had a blood transfusion? UNKNOWN -    12. Are you willing to have a blood transfusion if it is medically needed before, during, or after your surgery? Yes   13. Have you or any of your relatives ever had problems with anesthesia? UNKNOWN -    14. Do you have sleep apnea, excessive snoring or daytime drowsiness? No   15. Do you have any artifical heart valves or other implanted medical devices like a pacemaker, defibrillator, or continuous glucose monitor? No   16. Do you have artificial joints? No   17. Are you allergic to latex? No       Health Care Directive:  Patient does not have a Health Care Directive or Living Will: Discussed advance care planning with patient; information given to patient to review.    Preoperative Review of :   reviewed - no record of controlled substances prescribed.    Status of Chronic Conditions:  See problem list for active medical problems.  Problems all longstanding and stable, except as noted/documented.  See ROS for pertinent symptoms related to these conditions.      Review of Systems  CONSTITUTIONAL: NEGATIVE for fever, chills, change in weight  INTEGUMENTARY/SKIN: NEGATIVE for worrisome rashes, moles or lesions  EYES: NEGATIVE for vision changes or irritation  ENT/MOUTH: NEGATIVE for ear, mouth and  throat problems  RESP: NEGATIVE for significant cough or SOB  CV: NEGATIVE for chest pain, palpitations or peripheral edema  GI: NEGATIVE for nausea, abdominal pain, heartburn, or change in bowel habits  : NEGATIVE for frequency, dysuria, or hematuria  MUSCULOSKELETAL: NEGATIVE for significant arthralgias or myalgia  NEURO: NEGATIVE for weakness, dizziness or paresthesias  ENDOCRINE: NEGATIVE for temperature intolerance, skin/hair changes  HEME: NEGATIVE for bleeding problems  PSYCHIATRIC: NEGATIVE for changes in mood or affect    Patient Active Problem List    Diagnosis Date Noted     Nontraumatic complete tear of right rotator cuff 08/09/2021     Priority: Medium     Arthritis of right acromioclavicular joint 08/09/2021     Priority: Medium     Adhesive capsulitis of right shoulder 04/26/2021     Priority: Medium     Claustrophobia 04/26/2021     Priority: Medium     Acute pain of right shoulder 12/23/2020     Priority: Medium     Numbness and tingling of right arm 12/23/2020     Priority: Medium     Epistaxis 05/20/2014     Priority: Medium     Do you wish to do the replacement in the background? yes         Dysthymic disorder 09/26/2013     Priority: Medium     Family history of colon cancer 08/29/2013     Priority: Medium     Father - Uncertain on age - possibly mid 50s - patient will let me know and we will screen accordingly        Family history of prostate cancer 08/29/2013     Priority: Medium     Father - uncertain age        Erectile dysfunction 06/29/2012     Priority: Medium     HYPERLIPIDEMIA LDL GOAL <160 05/09/2010     Priority: Medium     Allergic rhinitis 05/08/2006     Priority: Medium     Problem list name updated by automated process. Provider to review        Past Medical History:   Diagnosis Date     Adhesive capsulitis of right shoulder 4/26/2021     ALLERGIC RHINITIS NOS 5/8/2006     Depressive disorder Sometime in the 1990s, I think     Depressive disorder, not elsewhere classified       Family history of colon cancer      Other and unspecified hyperlipidemia      Past Surgical History:   Procedure Laterality Date     ANESTHESIA OUT OF OR MRI N/A 4/7/2021    Procedure: ANESTHESIA OUT OF OR Magnetic resonance imaging cervical spine @1200;  Surgeon: GENERIC ANESTHESIA PROVIDER;  Location: [facility] OR     ANESTHESIA OUT OF OR MRI Right 7/19/2021    Procedure: Magnetic Resonance Imaging of  right shoulder without contrast@1400;  Surgeon: GENERIC ANESTHESIA PROVIDER;  Location: [facility] OR     COLONOSCOPY  05/2016     HERNIA REPAIR  2008 or 2009     Current Outpatient Medications   Medication Sig Dispense Refill     buPROPion (WELLBUTRIN XL) 150 MG 24 hr tablet Take 1 tablet (150 mg) by mouth every morning (Take in addition to 300 mg dose) 90 tablet 3     CLARITIN 10 MG OR TABS 1 TABLET DAILY prn for allergies       gabapentin (NEURONTIN) 300 MG capsule TAKE 1-2 CAPSULES (300-600 MG) BY MOUTH NIGHTLY AS NEEDED (PAIN AT BEDTIME) 180 capsule 0     IBUPROFEN 600 MG OR TABS 1 tab prn for pain 0 0     Multiple Vitamin (DAILY MULTIVITAMIN PO) Take  by mouth daily.       phenylephrine (RONY-SYNEPHRINE) 0.25 % nasal spray Spray 1 spray into both nostrils every 4 hours as needed for congestion or other (Nose bleed) 15 mL 0     propranolol (INDERAL) 20 MG tablet TAKE 1 TO 2 TABLETS, 30 MINUTES BEFORE ANXIETY INDUCING EVENT 90 tablet 0     Pseudoeph-Doxylamine-DM-APAP (NYQUIL PO) Take by mouth daily as needed        sildenafil (VIAGRA) 100 MG tablet Take 1 tablet (100 mg) by mouth daily as needed TAKE 1 TABLET BY MOUTH DAILY AS NEEDED. TAKE 30 MINS-4 HRS PRIOR TO INTERCOURSE 12 tablet 3     triamcinolone (KENALOG) 0.1 % external cream Apply topically 2 times daily as needed for irritation Apply sparingly to affected area three times daily as needed 45 g 1     TYLENOL 325 MG OR TABS ONE TO TWO TABLETS EVERY 4 TO 6 HOURS AS NEEDED FOR PAIN 100 0       No Known Allergies     Social History     Tobacco Use     Smoking status: Never  "Smoker     Smokeless tobacco: Never Used   Substance Use Topics     Alcohol use: Yes     Comment: moderate, 2 beers per day     Family History   Problem Relation Age of Onset     Cancer - colorectal Father      Prostate Cancer Father      Heart Disease Father         heart attack     Colon Cancer Father      Breast Cancer Sister      Diabetes No family hx of      C.A.D. No family hx of      Hypertension No family hx of      Cerebrovascular Disease No family hx of      History   Drug Use No         Objective     BP (!) 132/90 (BP Location: Right arm, Patient Position: Chair, Cuff Size: Adult Large)   Pulse 90   Temp 98  F (36.7  C) (Oral)   Resp 26   Ht 1.77 m (5' 9.69\")   Wt 102.8 kg (226 lb 9.6 oz)   SpO2 96%   BMI 32.81 kg/m      Physical Exam    GENERAL APPEARANCE: healthy, alert and no distress     HENT: ear canals and TM's normal and nose and mouth without ulcers or lesions     NECK: no adenopathy, no asymmetry, masses, or scars and thyroid normal to palpation     RESP: lungs clear to auscultation - no rales, rhonchi or wheezes     CV: regular rates and rhythm, normal S1 S2, no S3 or S4 and no murmur, click or rub     ABDOMEN:  soft, nontender, no HSM or masses and bowel sounds normal     MS: extremities normal- no gross deformities noted, no evidence of inflammation in joints, FROM in all extremities.     SKIN: no suspicious lesions or rashes     NEURO: Normal strength and tone, sensory exam grossly normal, mentation intact and speech normal     PSYCH: mentation appears normal. and affect normal/bright     LYMPHATICS: No cervical adenopathy    Recent Labs   Lab Test 07/11/20  0023 07/09/20  0218   HGB 14.8 13.7    221   INR 1.04  --      --    POTASSIUM 3.8  --    CR 0.93  --         Diagnostics:  COVID test is Pending.    No EKG required for low risk surgery (cataract, skin procedure, breast biopsy, etc).    Revised Cardiac Risk Index (RCRI):  The patient has the following serious " cardiovascular risks for perioperative complications:   - No serious cardiac risks = 0 points     RCRI Interpretation: 0 points: Class I (very low risk - 0.4% complication rate)           Signed Electronically by: Suri Galaviz MD  Copy of this evaluation report is provided to requesting physician.

## 2021-08-31 NOTE — PATIENT INSTRUCTIONS

## 2021-08-31 NOTE — H&P (VIEW-ONLY)
[address] [address]  Phone: [phone]  Primary Provider: Lio Huizar  Pre-op Performing Provider: IKE YAP    PREOPERATIVE EVALUATION:  Today's date: 8/31/2021    Yvon Dunlap is a 50 year old male who presents for a preoperative evaluation.    Surgical Information:  Surgery/Procedure: Arthrotomy, shoulder  [place] [facility] [place]: Dr. Kaiser  Surgery Date: 09/03/2021  Time of Surgery: 6AM  Where patient plans to recover: At home with family  Fax number for surgical facility: Note does not need to be faxed, will be available electronically in Epic.    Type of Anesthesia Anticipated: to be determined    Assessment & Plan     The proposed surgical procedure is considered INTERMEDIATE risk.    Preop general physical exam  Patient will get a Covid test today.  Advised patient to be n.p.o. after midnight morning for surgery.    Nontraumatic complete tear of right rotator cuff    - Asymptomatic COVID-19 Virus (Coronavirus) by PCR Nose    Arthritis of right acromioclavicular joint    - Asymptomatic COVID-19 Virus (Coronavirus) by PCR Nose         Risks and Recommendations:  The patient has the following additional risks and recommendations for perioperative complications:   - No identified additional risk factors other than previously addressed    Medication Instructions:  Patient is to take all scheduled medications on the day of surgery EXCEPT for modifications listed below:    RECOMMENDATION:  APPROVAL GIVEN to proceed with proposed procedure, without further diagnostic evaluation.      Subjective     HPI related to upcoming procedure: :  Chronic right shoulder rotator cuff injury.  Scheduled to have Arthrotomy.    Preop Questions 8/31/2021   1. Have you ever had a heart attack or stroke? No   2. Have you ever had surgery on your heart or blood vessels, such as a stent placement, a coronary artery bypass, or surgery on  an artery in your head, neck, heart, or legs? No   3. Do you have chest pain with activity? No   4. Do you have a history of  heart failure? No   5. Do you currently have a cold, bronchitis or symptoms of other infection? No   6. Do you have a cough, shortness of breath, or wheezing? No   7. Do you or anyone in your family have previous history of blood clots? No   8. Do you or does anyone in your family have a serious bleeding problem such as prolonged bleeding following surgeries or cuts? No   9. Have you ever had problems with anemia or been told to take iron pills? YES -    10. Have you had any abnormal blood loss such as black, tarry or bloody stools? No   11. Have you ever had a blood transfusion? UNKNOWN -    12. Are you willing to have a blood transfusion if it is medically needed before, during, or after your surgery? Yes   13. Have you or any of your relatives ever had problems with anesthesia? UNKNOWN -    14. Do you have sleep apnea, excessive snoring or daytime drowsiness? No   15. Do you have any artifical heart valves or other implanted medical devices like a pacemaker, defibrillator, or continuous glucose monitor? No   16. Do you have artificial joints? No   17. Are you allergic to latex? No       Health Care Directive:  Patient does not have a Health Care Directive or Living Will: Discussed advance care planning with patient; information given to patient to review.    Preoperative Review of :   reviewed - no record of controlled substances prescribed.    Status of Chronic Conditions:  See problem list for active medical problems.  Problems all longstanding and stable, except as noted/documented.  See ROS for pertinent symptoms related to these conditions.      Review of Systems  CONSTITUTIONAL: NEGATIVE for fever, chills, change in weight  INTEGUMENTARY/SKIN: NEGATIVE for worrisome rashes, moles or lesions  EYES: NEGATIVE for vision changes or irritation  ENT/MOUTH: NEGATIVE for ear, mouth and  throat problems  RESP: NEGATIVE for significant cough or SOB  CV: NEGATIVE for chest pain, palpitations or peripheral edema  GI: NEGATIVE for nausea, abdominal pain, heartburn, or change in bowel habits  : NEGATIVE for frequency, dysuria, or hematuria  MUSCULOSKELETAL: NEGATIVE for significant arthralgias or myalgia  NEURO: NEGATIVE for weakness, dizziness or paresthesias  ENDOCRINE: NEGATIVE for temperature intolerance, skin/hair changes  HEME: NEGATIVE for bleeding problems  PSYCHIATRIC: NEGATIVE for changes in mood or affect    Patient Active Problem List    Diagnosis Date Noted     Nontraumatic complete tear of right rotator cuff 08/09/2021     Priority: Medium     Arthritis of right acromioclavicular joint 08/09/2021     Priority: Medium     Adhesive capsulitis of right shoulder 04/26/2021     Priority: Medium     Claustrophobia 04/26/2021     Priority: Medium     Acute pain of right shoulder 12/23/2020     Priority: Medium     Numbness and tingling of right arm 12/23/2020     Priority: Medium     Epistaxis 05/20/2014     Priority: Medium     Do you wish to do the replacement in the background? yes         Dysthymic disorder 09/26/2013     Priority: Medium     Family history of colon cancer 08/29/2013     Priority: Medium     Father - Uncertain on age - possibly mid 50s - patient will let me know and we will screen accordingly        Family history of prostate cancer 08/29/2013     Priority: Medium     Father - uncertain age        Erectile dysfunction 06/29/2012     Priority: Medium     HYPERLIPIDEMIA LDL GOAL <160 05/09/2010     Priority: Medium     Allergic rhinitis 05/08/2006     Priority: Medium     Problem list name updated by automated process. Provider to review        Past Medical History:   Diagnosis Date     Adhesive capsulitis of right shoulder 4/26/2021     ALLERGIC RHINITIS NOS 5/8/2006     Depressive disorder Sometime in the 1990s, I think     Depressive disorder, not elsewhere classified       Family history of colon cancer      Other and unspecified hyperlipidemia      Past Surgical History:   Procedure Laterality Date     ANESTHESIA OUT OF OR MRI N/A 4/7/2021    Procedure: ANESTHESIA OUT OF OR Magnetic resonance imaging cervical spine @1200;  Surgeon: GENERIC ANESTHESIA PROVIDER;  Location: [facility] OR     ANESTHESIA OUT OF OR MRI Right 7/19/2021    Procedure: Magnetic Resonance Imaging of  right shoulder without contrast@1400;  Surgeon: GENERIC ANESTHESIA PROVIDER;  Location: [facility] OR     COLONOSCOPY  05/2016     HERNIA REPAIR  2008 or 2009     Current Outpatient Medications   Medication Sig Dispense Refill     buPROPion (WELLBUTRIN XL) 150 MG 24 hr tablet Take 1 tablet (150 mg) by mouth every morning (Take in addition to 300 mg dose) 90 tablet 3     CLARITIN 10 MG OR TABS 1 TABLET DAILY prn for allergies       gabapentin (NEURONTIN) 300 MG capsule TAKE 1-2 CAPSULES (300-600 MG) BY MOUTH NIGHTLY AS NEEDED (PAIN AT BEDTIME) 180 capsule 0     IBUPROFEN 600 MG OR TABS 1 tab prn for pain 0 0     Multiple Vitamin (DAILY MULTIVITAMIN PO) Take  by mouth daily.       phenylephrine (RONY-SYNEPHRINE) 0.25 % nasal spray Spray 1 spray into both nostrils every 4 hours as needed for congestion or other (Nose bleed) 15 mL 0     propranolol (INDERAL) 20 MG tablet TAKE 1 TO 2 TABLETS, 30 MINUTES BEFORE ANXIETY INDUCING EVENT 90 tablet 0     Pseudoeph-Doxylamine-DM-APAP (NYQUIL PO) Take by mouth daily as needed        sildenafil (VIAGRA) 100 MG tablet Take 1 tablet (100 mg) by mouth daily as needed TAKE 1 TABLET BY MOUTH DAILY AS NEEDED. TAKE 30 MINS-4 HRS PRIOR TO INTERCOURSE 12 tablet 3     triamcinolone (KENALOG) 0.1 % external cream Apply topically 2 times daily as needed for irritation Apply sparingly to affected area three times daily as needed 45 g 1     TYLENOL 325 MG OR TABS ONE TO TWO TABLETS EVERY 4 TO 6 HOURS AS NEEDED FOR PAIN 100 0       No Known Allergies     Social History     Tobacco Use     Smoking status: Never  "Smoker     Smokeless tobacco: Never Used   Substance Use Topics     Alcohol use: Yes     Comment: moderate, 2 beers per day     Family History   Problem Relation Age of Onset     Cancer - colorectal Father      Prostate Cancer Father      Heart Disease Father         heart attack     Colon Cancer Father      Breast Cancer Sister      Diabetes No family hx of      C.A.D. No family hx of      Hypertension No family hx of      Cerebrovascular Disease No family hx of      History   Drug Use No         Objective     BP (!) 132/90 (BP Location: Right arm, Patient Position: Chair, Cuff Size: Adult Large)   Pulse 90   Temp 98  F (36.7  C) (Oral)   Resp 26   Ht 1.77 m (5' 9.69\")   Wt 102.8 kg (226 lb 9.6 oz)   SpO2 96%   BMI 32.81 kg/m      Physical Exam    GENERAL APPEARANCE: healthy, alert and no distress     HENT: ear canals and TM's normal and nose and mouth without ulcers or lesions     NECK: no adenopathy, no asymmetry, masses, or scars and thyroid normal to palpation     RESP: lungs clear to auscultation - no rales, rhonchi or wheezes     CV: regular rates and rhythm, normal S1 S2, no S3 or S4 and no murmur, click or rub     ABDOMEN:  soft, nontender, no HSM or masses and bowel sounds normal     MS: extremities normal- no gross deformities noted, no evidence of inflammation in joints, FROM in all extremities.     SKIN: no suspicious lesions or rashes     NEURO: Normal strength and tone, sensory exam grossly normal, mentation intact and speech normal     PSYCH: mentation appears normal. and affect normal/bright     LYMPHATICS: No cervical adenopathy    Recent Labs   Lab Test 07/11/20  0023 07/09/20  0218   HGB 14.8 13.7    221   INR 1.04  --      --    POTASSIUM 3.8  --    CR 0.93  --         Diagnostics:  COVID test is Pending.    No EKG required for low risk surgery (cataract, skin procedure, breast biopsy, etc).    Revised Cardiac Risk Index (RCRI):  The patient has the following serious " cardiovascular risks for perioperative complications:   - No serious cardiac risks = 0 points     RCRI Interpretation: 0 points: Class I (very low risk - 0.4% complication rate)           Signed Electronically by: Suri Galaviz MD  Copy of this evaluation report is provided to requesting physician.

## 2021-09-01 ENCOUNTER — ANESTHESIA EVENT (OUTPATIENT)
Dept: SURGERY | Facility: AMBULATORY SURGERY CENTER | Age: 51
End: 2021-09-01
Payer: COMMERCIAL

## 2021-09-01 LAB — SARS-COV-2 RNA RESP QL NAA+PROBE: NEGATIVE

## 2021-09-01 RX ORDER — NALOXONE HYDROCHLORIDE 0.4 MG/ML
0.2 INJECTION, SOLUTION INTRAMUSCULAR; INTRAVENOUS; SUBCUTANEOUS
Status: DISCONTINUED | OUTPATIENT
Start: 2021-09-01 | End: 2021-09-04 | Stop reason: HOSPADM

## 2021-09-01 RX ORDER — NALOXONE HYDROCHLORIDE 0.4 MG/ML
0.4 INJECTION, SOLUTION INTRAMUSCULAR; INTRAVENOUS; SUBCUTANEOUS
Status: DISCONTINUED | OUTPATIENT
Start: 2021-09-01 | End: 2021-09-04 | Stop reason: HOSPADM

## 2021-09-01 NOTE — ANESTHESIA PREPROCEDURE EVALUATION
Anesthesia Pre-Procedure Evaluation    Patient: Yvon Dunlap   MRN: [id number] : 1970        Preoperative Diagnosis: Nontraumatic complete tear of right rotator cuff [M75.121]  Arthritis of right acromioclavicular joint [M19.011]   Procedure : Procedure(s):  ARTHROTOMY, SHOULDER, WITH ROTATOR CUFF REPAIR, acromioplasty, distal clavicle excision     Past Medical History:   Diagnosis Date     Adhesive capsulitis of right shoulder 2021     ALLERGIC RHINITIS NOS 2006     Depressive disorder Sometime in the 1990s, I think     Depressive disorder, not elsewhere classified      Family history of colon cancer      Other and unspecified hyperlipidemia       Past Surgical History:   Procedure Laterality Date     ANESTHESIA OUT OF OR MRI N/A 2021    Procedure: ANESTHESIA OUT OF OR Magnetic resonance imaging cervical spine @1200;  Surgeon: GENERIC ANESTHESIA PROVIDER;  Location: [facility] OR     ANESTHESIA OUT OF OR MRI Right 2021    Procedure: Magnetic Resonance Imaging of  right shoulder without contrast@1400;  Surgeon: GENERIC ANESTHESIA PROVIDER;  Location: [facility] OR     COLONOSCOPY  2016     HERNIA REPAIR   or       No Known Allergies   Social History     Tobacco Use     Smoking status: Never Smoker     Smokeless tobacco: Never Used   Substance Use Topics     Alcohol use: Yes     Comment: moderate, 2 beers per day      Wt Readings from Last 1 Encounters:   21 102.8 kg (226 lb 9.6 oz)              OUTSIDE LABS:  CBC:   Lab Results   Component Value Date    WBC 10.9 2020    WBC 8.2 2020    HGB 14.8 2020    HGB 13.7 2020    HCT 46.0 2020    HCT 41.2 2020     2020     2020     BMP:   Lab Results   Component Value Date     2020     2014    POTASSIUM 3.8 2020    POTASSIUM 3.7 2014    CHLORIDE 107 2020    CHLORIDE 106 2014    CO2 25 2020    CO2 23 2014    BUN 12 2020     BUN 30 (H) 05/19/2014    CR 0.93 07/11/2020    CR 0.83 05/19/2014     (H) 07/19/2021     (H) 07/11/2020     COAGS:   Lab Results   Component Value Date    PTT 26 07/11/2020    INR 1.04 07/11/2020     POC: No results found for: BGM, HCG, HCGS  HEPATIC:   Lab Results   Component Value Date    ALBUMIN 3.9 05/19/2014    PROTTOTAL 6.8 05/19/2014    ALT 55 05/19/2014    AST 23 05/19/2014    ALKPHOS 51 05/19/2014    BILITOTAL 0.8 05/19/2014     OTHER:   Lab Results   Component Value Date    PALMER 8.8 07/11/2020    LIPASE 140 05/19/2014    TSH 3.36 08/29/2013    CRP 6.9 05/26/2016    SED 11 05/26/2016       Anesthesia Plan    ASA Status:  2      Anesthesia Type: General.     - Airway: LMA   Induction: Intravenous, Propofol.   Maintenance: Balanced.        Consents         - Extended Intubation/Ventilatory Support Discussed: No.      - Patient is DNR/DNI Status: No    Use of blood products discussed: No .     Postoperative Care    Pain management: IV analgesics, Oral pain medications, Multi-modal analgesia, Peripheral nerve block (Single Shot).   PONV prophylaxis: Dexamethasone or Solumedrol, Ondansetron (or other 5HT-3)     Comments:                Artie Jara MD

## 2021-09-03 ENCOUNTER — HOSPITAL ENCOUNTER (OUTPATIENT)
Facility: AMBULATORY SURGERY CENTER | Age: 51
End: 2021-09-03
Attending: ORTHOPAEDIC SURGERY | Admitting: ORTHOPAEDIC SURGERY
Payer: COMMERCIAL

## 2021-09-03 ENCOUNTER — ANESTHESIA (OUTPATIENT)
Dept: SURGERY | Facility: AMBULATORY SURGERY CENTER | Age: 51
End: 2021-09-03
Payer: COMMERCIAL

## 2021-09-03 ENCOUNTER — SURGERY (OUTPATIENT)
Age: 51
End: 2021-09-03
Payer: COMMERCIAL

## 2021-09-03 VITALS
SYSTOLIC BLOOD PRESSURE: 121 MMHG | TEMPERATURE: 98.1 F | OXYGEN SATURATION: 95 % | RESPIRATION RATE: 16 BRPM | HEART RATE: 88 BPM | DIASTOLIC BLOOD PRESSURE: 84 MMHG

## 2021-09-03 DIAGNOSIS — M19.011 ARTHRITIS OF RIGHT ACROMIOCLAVICULAR JOINT: ICD-10-CM

## 2021-09-03 DIAGNOSIS — M75.121 NONTRAUMATIC COMPLETE TEAR OF RIGHT ROTATOR CUFF: ICD-10-CM

## 2021-09-03 PROCEDURE — 23412 REPAIR ROTATOR CUFF CHRONIC: CPT | Mod: RT | Performed by: ORTHOPAEDIC SURGERY

## 2021-09-03 PROCEDURE — 23415 RELEASE OF SHOULDER LIGAMENT: CPT | Mod: RT

## 2021-09-03 PROCEDURE — G8918 PT W/O PREOP ORDER IV AB PRO: HCPCS

## 2021-09-03 PROCEDURE — 23130 ACROMP/ACROMIONECTOMY PRTL: CPT | Mod: 59 | Performed by: ORTHOPAEDIC SURGERY

## 2021-09-03 PROCEDURE — 23120 CLAVICULECTOMY PARTIAL: CPT | Mod: RT

## 2021-09-03 PROCEDURE — G8907 PT DOC NO EVENTS ON DISCHARG: HCPCS

## 2021-09-03 PROCEDURE — 23120 CLAVICULECTOMY PARTIAL: CPT | Mod: 59 | Performed by: ORTHOPAEDIC SURGERY

## 2021-09-03 RX ORDER — ACETAMINOPHEN 500 MG
1000 TABLET ORAL EVERY 6 HOURS PRN
Start: 2021-09-03 | End: 2022-12-02

## 2021-09-03 RX ORDER — ONDANSETRON 2 MG/ML
4 INJECTION INTRAMUSCULAR; INTRAVENOUS EVERY 30 MIN PRN
Status: DISCONTINUED | OUTPATIENT
Start: 2021-09-03 | End: 2021-09-04 | Stop reason: HOSPADM

## 2021-09-03 RX ORDER — ALBUTEROL SULFATE 0.83 MG/ML
2.5 SOLUTION RESPIRATORY (INHALATION) EVERY 4 HOURS PRN
Status: DISCONTINUED | OUTPATIENT
Start: 2021-09-03 | End: 2021-09-04 | Stop reason: HOSPADM

## 2021-09-03 RX ORDER — ACETAMINOPHEN 325 MG/1
975 TABLET ORAL ONCE
Status: COMPLETED | OUTPATIENT
Start: 2021-09-03 | End: 2021-09-03

## 2021-09-03 RX ORDER — KETOROLAC TROMETHAMINE 30 MG/ML
15 INJECTION, SOLUTION INTRAMUSCULAR; INTRAVENOUS EVERY 6 HOURS PRN
Status: DISCONTINUED | OUTPATIENT
Start: 2021-09-03 | End: 2021-09-04 | Stop reason: HOSPADM

## 2021-09-03 RX ORDER — OXYCODONE HYDROCHLORIDE 5 MG/1
5-10 TABLET ORAL EVERY 4 HOURS PRN
Status: DISCONTINUED | OUTPATIENT
Start: 2021-09-03 | End: 2021-09-04 | Stop reason: HOSPADM

## 2021-09-03 RX ORDER — PROPOFOL 10 MG/ML
INJECTION, EMULSION INTRAVENOUS PRN
Status: DISCONTINUED | OUTPATIENT
Start: 2021-09-03 | End: 2021-09-03

## 2021-09-03 RX ORDER — DEXAMETHASONE SODIUM PHOSPHATE 4 MG/ML
INJECTION, SOLUTION INTRA-ARTICULAR; INTRALESIONAL; INTRAMUSCULAR; INTRAVENOUS; SOFT TISSUE PRN
Status: DISCONTINUED | OUTPATIENT
Start: 2021-09-03 | End: 2021-09-03

## 2021-09-03 RX ORDER — LIDOCAINE 40 MG/G
CREAM TOPICAL
Status: DISCONTINUED | OUTPATIENT
Start: 2021-09-03 | End: 2021-09-04 | Stop reason: HOSPADM

## 2021-09-03 RX ORDER — HYDROXYZINE HYDROCHLORIDE 25 MG/1
25 TABLET, FILM COATED ORAL 3 TIMES DAILY PRN
Qty: 20 TABLET | Refills: 1 | Status: SHIPPED | OUTPATIENT
Start: 2021-09-03 | End: 2021-09-07

## 2021-09-03 RX ORDER — SODIUM CHLORIDE, SODIUM LACTATE, POTASSIUM CHLORIDE, CALCIUM CHLORIDE 600; 310; 30; 20 MG/100ML; MG/100ML; MG/100ML; MG/100ML
INJECTION, SOLUTION INTRAVENOUS CONTINUOUS
Status: DISCONTINUED | OUTPATIENT
Start: 2021-09-03 | End: 2021-09-04 | Stop reason: HOSPADM

## 2021-09-03 RX ORDER — OXYCODONE HYDROCHLORIDE 5 MG/1
5-10 TABLET ORAL EVERY 4 HOURS PRN
Qty: 30 TABLET | Refills: 0 | Status: SHIPPED | OUTPATIENT
Start: 2021-09-03 | End: 2021-09-07

## 2021-09-03 RX ORDER — LORAZEPAM 2 MG/ML
.5-1 INJECTION INTRAMUSCULAR
Status: DISCONTINUED | OUTPATIENT
Start: 2021-09-03 | End: 2021-09-04 | Stop reason: HOSPADM

## 2021-09-03 RX ORDER — ONDANSETRON 2 MG/ML
INJECTION INTRAMUSCULAR; INTRAVENOUS PRN
Status: DISCONTINUED | OUTPATIENT
Start: 2021-09-03 | End: 2021-09-03

## 2021-09-03 RX ORDER — FENTANYL CITRATE 50 UG/ML
25 INJECTION, SOLUTION INTRAMUSCULAR; INTRAVENOUS EVERY 5 MIN PRN
Status: DISCONTINUED | OUTPATIENT
Start: 2021-09-03 | End: 2021-09-04 | Stop reason: HOSPADM

## 2021-09-03 RX ORDER — CEFAZOLIN SODIUM 2 G/100ML
2 INJECTION, SOLUTION INTRAVENOUS
Status: DISCONTINUED | OUTPATIENT
Start: 2021-09-03 | End: 2021-09-04 | Stop reason: HOSPADM

## 2021-09-03 RX ORDER — FENTANYL CITRATE 50 UG/ML
25-50 INJECTION, SOLUTION INTRAMUSCULAR; INTRAVENOUS
Status: DISCONTINUED | OUTPATIENT
Start: 2021-09-03 | End: 2021-09-04 | Stop reason: HOSPADM

## 2021-09-03 RX ORDER — HYDRALAZINE HYDROCHLORIDE 20 MG/ML
2.5-5 INJECTION INTRAMUSCULAR; INTRAVENOUS EVERY 10 MIN PRN
Status: DISCONTINUED | OUTPATIENT
Start: 2021-09-03 | End: 2021-09-04 | Stop reason: HOSPADM

## 2021-09-03 RX ORDER — MEPERIDINE HYDROCHLORIDE 25 MG/ML
12.5 INJECTION INTRAMUSCULAR; INTRAVENOUS; SUBCUTANEOUS
Status: DISCONTINUED | OUTPATIENT
Start: 2021-09-03 | End: 2021-09-04 | Stop reason: HOSPADM

## 2021-09-03 RX ORDER — CEFAZOLIN SODIUM 2 G/100ML
2 INJECTION, SOLUTION INTRAVENOUS SEE ADMIN INSTRUCTIONS
Status: DISCONTINUED | OUTPATIENT
Start: 2021-09-03 | End: 2021-09-04 | Stop reason: HOSPADM

## 2021-09-03 RX ORDER — LIDOCAINE HYDROCHLORIDE 20 MG/ML
INJECTION, SOLUTION INFILTRATION; PERINEURAL PRN
Status: DISCONTINUED | OUTPATIENT
Start: 2021-09-03 | End: 2021-09-03

## 2021-09-03 RX ORDER — LABETALOL HYDROCHLORIDE 5 MG/ML
10 INJECTION, SOLUTION INTRAVENOUS
Status: DISCONTINUED | OUTPATIENT
Start: 2021-09-03 | End: 2021-09-04 | Stop reason: HOSPADM

## 2021-09-03 RX ORDER — FLUMAZENIL 0.1 MG/ML
0.2 INJECTION, SOLUTION INTRAVENOUS
Status: DISCONTINUED | OUTPATIENT
Start: 2021-09-03 | End: 2021-09-04 | Stop reason: HOSPADM

## 2021-09-03 RX ORDER — ONDANSETRON 4 MG/1
4 TABLET, ORALLY DISINTEGRATING ORAL EVERY 30 MIN PRN
Status: DISCONTINUED | OUTPATIENT
Start: 2021-09-03 | End: 2021-09-04 | Stop reason: HOSPADM

## 2021-09-03 RX ORDER — BUPIVACAINE HYDROCHLORIDE AND EPINEPHRINE 5; 5 MG/ML; UG/ML
INJECTION, SOLUTION PERINEURAL PRN
Status: DISCONTINUED | OUTPATIENT
Start: 2021-09-03 | End: 2021-09-03

## 2021-09-03 RX ORDER — ACETAMINOPHEN 325 MG/1
975 TABLET ORAL ONCE
Status: DISCONTINUED | OUTPATIENT
Start: 2021-09-03 | End: 2021-09-04 | Stop reason: HOSPADM

## 2021-09-03 RX ORDER — PROPOFOL 10 MG/ML
INJECTION, EMULSION INTRAVENOUS CONTINUOUS PRN
Status: DISCONTINUED | OUTPATIENT
Start: 2021-09-03 | End: 2021-09-03

## 2021-09-03 RX ADMIN — PROPOFOL 250 MG: 10 INJECTION, EMULSION INTRAVENOUS at 07:42

## 2021-09-03 RX ADMIN — LIDOCAINE HYDROCHLORIDE 60 MG: 20 INJECTION, SOLUTION INFILTRATION; PERINEURAL at 07:42

## 2021-09-03 RX ADMIN — Medication 100 MCG: at 08:28

## 2021-09-03 RX ADMIN — Medication 100 MCG: at 08:50

## 2021-09-03 RX ADMIN — ACETAMINOPHEN 975 MG: 325 TABLET ORAL at 06:40

## 2021-09-03 RX ADMIN — OXYCODONE HYDROCHLORIDE 5 MG: 5 TABLET ORAL at 10:28

## 2021-09-03 RX ADMIN — Medication 100 MCG: at 08:52

## 2021-09-03 RX ADMIN — PROPOFOL 40 MG: 10 INJECTION, EMULSION INTRAVENOUS at 08:05

## 2021-09-03 RX ADMIN — Medication 100 MCG: at 08:42

## 2021-09-03 RX ADMIN — SODIUM CHLORIDE, SODIUM LACTATE, POTASSIUM CHLORIDE, CALCIUM CHLORIDE: 600; 310; 30; 20 INJECTION, SOLUTION INTRAVENOUS at 06:58

## 2021-09-03 RX ADMIN — Medication 100 MCG: at 08:58

## 2021-09-03 RX ADMIN — Medication 100 MCG: at 09:05

## 2021-09-03 RX ADMIN — DEXAMETHASONE SODIUM PHOSPHATE 4 MG: 4 INJECTION, SOLUTION INTRA-ARTICULAR; INTRALESIONAL; INTRAMUSCULAR; INTRAVENOUS; SOFT TISSUE at 07:59

## 2021-09-03 RX ADMIN — Medication 100 MCG: at 08:39

## 2021-09-03 RX ADMIN — FENTANYL CITRATE 50 MCG: 50 INJECTION, SOLUTION INTRAMUSCULAR; INTRAVENOUS at 08:34

## 2021-09-03 RX ADMIN — Medication 50 MCG: at 08:33

## 2021-09-03 RX ADMIN — ONDANSETRON 4 MG: 2 INJECTION INTRAMUSCULAR; INTRAVENOUS at 08:51

## 2021-09-03 RX ADMIN — Medication 100 MCG: at 08:36

## 2021-09-03 RX ADMIN — FENTANYL CITRATE 50 MCG: 50 INJECTION, SOLUTION INTRAMUSCULAR; INTRAVENOUS at 07:27

## 2021-09-03 RX ADMIN — ONDANSETRON 4 MG: 2 INJECTION INTRAMUSCULAR; INTRAVENOUS at 10:18

## 2021-09-03 RX ADMIN — Medication 100 MCG: at 08:21

## 2021-09-03 RX ADMIN — BUPIVACAINE HYDROCHLORIDE AND EPINEPHRINE 13 ML: 5; 5 INJECTION, SOLUTION PERINEURAL at 07:30

## 2021-09-03 RX ADMIN — Medication 100 MCG: at 08:47

## 2021-09-03 RX ADMIN — Medication 100 MCG: at 08:57

## 2021-09-03 RX ADMIN — PROPOFOL 30 MG: 10 INJECTION, EMULSION INTRAVENOUS at 08:08

## 2021-09-03 RX ADMIN — Medication 50 MCG: at 08:29

## 2021-09-03 RX ADMIN — FENTANYL CITRATE 50 MCG: 50 INJECTION, SOLUTION INTRAMUSCULAR; INTRAVENOUS at 08:09

## 2021-09-03 RX ADMIN — PROPOFOL 100 MCG/KG/MIN: 10 INJECTION, EMULSION INTRAVENOUS at 07:47

## 2021-09-03 RX ADMIN — Medication 100 MCG: at 09:01

## 2021-09-03 NOTE — OP NOTE
SURGEON:  NAIMA CUNHA MD    ASSISTANT  Demetrius Pantoja PA-C     ANESTHESIA: General                             FINDINGS:  Low grade partial  rotator cuff tear, Impingement and acromioclavicular spurring    ESTIMATED BLOOD LOSS: 40 cc       DRAINS: None    COMPLICATIONS:  None    SPECIMENS: Not sent clavicle and acromion.    DATE OF SURGERY:  9/3/2021     PREOPERATIVE  DIAGNOSIS  1. Right rotator cuff tear.  2. Right acromioclavicular arthritis.  3. Impingement.     PREOPERATIVE AND DISCHARGE DIAGNOSIS  1. Right low grade partial  rotator cuff tear.  2. Right acromioclavicular arthritis.  3. Impingement.     PROCEDURE   1. Right distal clavicle excision.  2. Acromioplasty with coracoacromial ligament resection.  3. Rotator cuff evaluation.    HISTORY   This is a 50 year old male who presents with right rotator cuff tear, acromioclavicular  arthritis and impingement. MRI shows high grade partial to small complete rotator cuff tear.  He has failed  conservative treatment and presents for right rotator cuff repair, clavicle excision and  acromioplasty.    SURGERY   After smooth general endotracheal anesthesia, the patient was placed in a  beach-chair position. The right shoulder was prepped and draped in the usual  fashion.  Pause was performed for patient verification.  A saber incision was  made from the mid acromion to the lateral coracoid, and carried down through  subcutaneous tissue.  The deltoid fascia was divided 5 cm, starting at the AC  joint.  It was also released from the anterior acromion and the distal  clavicle.  The coracoacromial ligament was identified and resected.  The  distal clavicle was subperiosteally exposed, and the distal 1 cm removed with  the oscillating saw and smoothed with a rasp.  There was a sharp overhang on  the anterior acromion that was aggressively debrided with osteotome and  rongeur, then smoothed with a rasp.  We excised the thickened bursa at this  point.  The rotator cuff  was examined showing no tear.  There was a very tight ligament from cuff to base of coracoid, which was limiting external rotation.  I released this.   I injected methylene blue into the shoulder and could not see a thin portion.  I did open the rotator interval to feel underneath.  I thought I could feel a thin area at supraspinatus-infraspinatus junction.  I opened this, but found only low grade partial tear with very good remaining tissue.  This defect was then repaired side to side with a running #1 vicryl suture in shoestring fashion.  I also closed the rotator interval in the same fashion.  We now had a water-tight closure of the rotator cuff.   The wound  was irrigated, and bleeding controlled with electrocautery. The deltoid  fascia was repaired to the trapezial fascia and the anterior acromion with  interrupted #1 Vicryl suture.  The side-to-side fiber division of the deltoid was closed with  running 0 Vicryl.  Subcutaneous tissue was closed with interrupted 2-0 Vicryl  suture, and the skin edges were closed with running 3-0 Prolene subcuticular  closure.  Steri-strips were applied.  Sterile dressings were applied. The  patient was taken to the recovery room in stable condition.          Vadim Kaiser M.D.  Department of Orthopaedic Surgery  [facility]        PATIENT: Yvon Dunlap    MR#: [id number]   : 1970

## 2021-09-03 NOTE — ANESTHESIA POSTPROCEDURE EVALUATION
Patient: Yvon Dunlap    Procedure(s):  ARTHROTOMY, SHOULDER, acromioplasty, distal clavicle excision    Diagnosis:Nontraumatic complete tear of right rotator cuff [M75.121]  Arthritis of right acromioclavicular joint [M19.011]  Diagnosis Additional Information: No value filed.    Anesthesia Type:  General, Peripheral Nerve Block    Note:  Disposition: Outpatient   Postop Pain Control: Uneventful            Sign Out: Well controlled pain   PONV:    Neuro/Psych: Uneventful            Sign Out: Acceptable/Baseline neuro status   Airway/Respiratory: Uneventful            Sign Out: Acceptable/Baseline resp. status   CV/Hemodynamics: Uneventful            Sign Out: Acceptable CV status; No obvious hypovolemia; No obvious fluid overload   Other NRE:    DID A NON-ROUTINE EVENT OCCUR?            Last vitals:  Vitals Value Taken Time   /85 09/03/21 1030   Temp 98.1  F (36.7  C) 09/03/21 1030   Pulse 88 09/03/21 1030   Resp 21 09/03/21 1030   SpO2 95 % 09/03/21 1030       Electronically Signed By: Artie Jara MD  September 3, 2021  3:00 PM

## 2021-09-03 NOTE — ANESTHESIA CARE TRANSFER NOTE
Patient: Yvon Dunlap    Procedure(s):  ARTHROTOMY, SHOULDER, acromioplasty, distal clavicle excision    Diagnosis: Nontraumatic complete tear of right rotator cuff [M75.121]  Arthritis of right acromioclavicular joint [M19.011]  Diagnosis Additional Information: No value filed.    Anesthesia Type:   General     Note:    Oropharynx: oropharynx clear of all foreign objects  Level of Consciousness: drowsy  Oxygen Supplementation: room air    Independent Airway: airway patency satisfactory and stable  Dentition: dentition unchanged  Vital Signs Stable: post-procedure vital signs reviewed and stable  Report to RN Given: handoff report given  Patient transferred to: PACU    Handoff Report: Identifed the Patient, Identified the Reponsible Provider, Reviewed the pertinent medical history, Discussed the surgical course, Reviewed Intra-OP anesthesia mangement and issues during anesthesia, Set expectations for post-procedure period and Allowed opportunity for questions and acknowledgement of understanding      Vitals:  Vitals Value Taken Time   /77 09/03/21 0945   Temp 97.4  F (36.3  C) 09/03/21 0931   Pulse 82 09/03/21 0945   Resp 14 09/03/21 0945   SpO2 96 % 09/03/21 0945   Vitals shown include unvalidated device data.    Electronically Signed By: LILLY Belle CRNA  September 3, 2021  9:46 AM

## 2021-09-03 NOTE — BRIEF OP NOTE
POST OPERATIVE NOTE-IMMEDIATE :    Date of surgery: 9/3/2021    Preoperative Diagnosis:  Nontraumatic complete tear of right rotator cuff [M75.121]  Arthritis of right acromioclavicular joint [M19.011]    Postoperative Diagnosis:  Right shoulder impingement, and acromioclavicular arthritis    Procedures:  Procedure(s):  ARTHROTOMY, SHOULDER, acromioplasty, distal clavicle excision    Prosthetic Devices: See Op Note    Surgeon(s) and Assistants (if any):  Attending Surgeon: Sourav Jeong MD, MS  Assistant: Sharath Pantoja PA-C    Anesthesia:  Combined General with Interscalene Block    Antibiotics: 2g Ancef    IV Fluids: 600cc LR    UOP: 0, no childers    Drains: none    Specimens: none    Complications: None apparent.    Tourniquet Time: not used    Findings/Conclusions: low-grade rotator cuff tendinosis. Acromioclavicular joint joint arthritis. See Op Note for further detail.    Estimated Blood Loss: 40cc    Post Op Plan:  *Rest   *Ice   *Elevation   *non-weight bearing right upper extremity - Strict  *remain in sling at all times  *oral pain medications  *Home exercise program   *Return to clinic 2 weeks for wound check, suture removal, sooner if needed      Sharath Pantoja PA-C, CAQ (Ortho)  Supervising Physician: Sourav Jeong M.D., M.S.  Dept. of Orthopaedic Surgery  [facility]

## 2021-09-03 NOTE — OR NURSING
Pt received Right shoulder Block in PreOp by Dr. Jara. VSS throughout procedure. Received 1mg Versed and 50mcg Fentanyl prior to Block start.   Tolerated procedure well.  Wife, Danita,  with pt in PreOp up until Block started.

## 2021-09-03 NOTE — DISCHARGE INSTRUCTIONS
"[facility]  Same-Day Surgery   Adult Discharge Orders & Instructions   For 24 hours after surgery  1. Get plenty of rest.  A responsible adult must stay with you for at least 24 hours after you leave the hospital.   2. Do not drive or use heavy equipment.  If you have weakness or tingling, don't drive or use heavy equipment until this feeling goes away.  3. Do not drink alcohol.  4. Avoid strenuous or risky activities.  Ask for help when climbing stairs.   5. You may feel lightheaded.  IF so, sit for a few minutes before standing.  Have someone help you get up.   6. If you have nausea (feel sick to your stomach): Drink only clear liquids such as apple juice, ginger ale, broth or 7-Up.  Rest may also help.  Be sure to drink enough fluids.  Move to a regular diet as you feel able.  7. You may have a slight fever. Call the doctor if your fever is over 100 F (37.7 C) (taken under the tongue) or lasts longer than 24 hours.  8. You may have a dry mouth, a sore throat, muscle aches or trouble sleeping.  These should go away after 24 hours.  9. Do not make important or legal decisions.   Call your doctor for any of the followin.  Signs of infection (fever, growing tenderness at the surgery site, a large amount of drainage or bleeding, severe pain, foul-smelling drainage, redness, swelling).    2. It has been over 8 to 10 hours since surgery and you are still not able to urinate (pass water).    3.  Headache for over 24 hours.    4.  Numbness, tingling or weakness the day after surgery (if you had spinal anesthesia).    Information about liposomal bupivacaine (Exparel)    What is Liposomal Bupivacaine?    Liposomal Bupivacaine is a numbing medication that can help you manage your pain after surgery.  This medication is similar to \"novacaine,\" which is often used by the dentist.  Liposomal bupivacaine is released slowly and can help control pain for up to 72 hours.    What is the purpose of " Liposomal Bupivacaine?    To manage your pain after surgery    To help you sleep better, take deep breaths, walk more comfortable, and feel up to visiting with others    How is the procedure done?    Liposomal bupivacaine is a medication given by an injection.    It is usually given right before your surgery.  If this is the case, you will be awake or sedated, but you should experience minimal pain during the procedure.    For some people, the injection may be given at the very end of your surgery.  It all depends on the type of surgery and your situation.    The procedure usually takes about 5-15 minutes.  An ultrasound machine will help the anesthesiologist insert it in the right place or the surgeon will inject it under direct vision.     A needle is used to place the numbing medication under your skin.  It provides pain relief by numbing the tissue in the area where your surgeon will make the incision.    What can I expect?    You may experience numbness, tingling, or a feeling of heaviness around the area that was injected.    If you experience any of the follow symptoms IMMEDIATELY CALL THE REGIONAL ANESTHESIA PAIN SERVICE:    Numbness or tingling occurs in areas other than around the injection site    Blurry vision    Ringing in your ears    A metallic taste in your mouth    PAGE: Dial [phone].  When prompted, enter the following 4-digit ID number:  [id number].  You will be prompted to enter your phone number; and then enter the # sign.  The clinician on call will call you back.    OR    CALL: Dial [phone].  Let the hospital  know that you are having a problem with a nerve block and that you would like to speak to the regional anesthesia pain service right away.    You should not receive any other type of numbing medication within 4 days after receiving liposomal bupivacaine unless your anesthesiologist approves.      Post Operative Instructions: Regional Anesthetic for Upper Extremity with  Liposomal Bupivacaine  General Information:   Regional anesthesia is when local anesthetic or  numbing  medication is injected around the nerves to anesthetize or  numb  the area supplied by that set of nerves. It is a type of analgesia used to control pain and decreases the need for narcotics following surgery.    Types of Regional Blocks:  Interscalene: A block injected into the neck on the operative shoulder/arm of a patient having shoulder surgery  Supraclavicular: A block injected near the clavicle on the operative shoulder of a patient having elbow, forearm, or hand surgery    Procedure:  The type of anesthesia your doctor used to numb your shoulder or arm will usually not start to wear off for 24-48 hours, but may last as long as 72 hours. You should be careful during that period, since it is possible to injure your arm without being aware of the injury. While your arm is numb, you should:    Avoid striking or bumping your arm    Avoid extreme hot or cold    Diet:  There are no restrictions on your diet. You should drink plenty of fluids.     Discomfort:  You will have a tingling and prickly sensation in your arm as the feeling begins to return. You can also expect some discomfort. The amount of discomfort is unpredictable, but if you have more pain than can be controlled with pain medication you should notify your physician.     Pain Medications:  Begin taking your oral pain pills before bedtime and during the night to avoid a sudden onset of pain as part of the block wears off.  Do not engage in drinking, driving, or hazardous occupations while taking pain medication.     Stitches:   You may have stitches or special skin closures. You doctor will inform you when to return to the office to have them removed.     Activity:  On the day of surgery you should try to stay in bed with your hand elevated on pillows. You may resume your normal activity after that, wearing a sling for comfort. Contact your physician  if you have any of the problems:     Continued numbness or tingling in the arm or hand after 72 hours    Swelling of the fingers or fingers that are cold to the touch    Excessive bleeding or drainage    Severe pain

## 2021-09-03 NOTE — ANESTHESIA PROCEDURE NOTES
Interscalene Procedure Note  Pre-Procedure   Staff -        Anesthesiologist:  Artie Jara MD       Performed By: anesthesiologist       Location: pre-op       Procedure Start/Stop Times: 9/3/2021 7:27 AM and 9/3/2021 7:32 AM       Pre-Anesthestic Checklist: patient identified, IV checked, site marked, risks and benefits discussed, informed consent, monitors and equipment checked, pre-op evaluation, at physician/surgeon's request and post-op pain management  Timeout:       Correct Patient: Yes        Correct Procedure: Yes        Correct Site: Yes        Correct Position: Yes        Correct Laterality: Yes        Site Marked: Yes  Procedure Documentation  Procedure: Interscalene       Diagnosis: RIGHT SHOULDER PAIN       Laterality: right       Patient Position: supine       Patient Prep/Sterile Barriers: sterile gloves, mask       Skin prep: Chloraprep       Needle Type: short bevel       Needle Gauge: 22.        Needle Length (millimeters): 50        Ultrasound guided       1. Ultrasound was used to identify targeted nerve, plexus, vascular marker, or fascial plane and place a needle adjacent to it in real-time.       2. Ultrasound was used to visualize the spread of anesthetic in close proximity to the above referenced structure.       3. A permanent image is entered into the patient's record.       4. The visualized anatomic structures appeared normal.       5. There were no apparent abnormal pathologic findings.    Assessment/Narrative         The placement was negative for: blood aspirated, painful injection and site bleeding       Paresthesias: No.     Bolus given via needle..        Secured via.        Insertion/Infusion Method: Single Shot       Complications: none       Injection made incrementally with aspirations every 2 mL.    Comments:  133mg Exparel injected

## 2021-09-03 NOTE — ANESTHESIA PROCEDURE NOTES
Airway       Patient location during procedure: OR       Procedure Start/Stop Times: 9/3/2021 7:39 AM and 9/3/2021 7:41 AM  Staff -        Anesthesiologist:  Artie Jara MD       CRNA: Yair Singer APRN CRNA       Performed By: CRNA, resident and anesthesiologistIndications and Patient Condition       Indications for airway management: ezra-procedural        Final Airway Details       Final airway type: supraglottic airway    Supraglottic Airway Details        Type: LMA       LMA size: 5       Airway Seal Pressure (cm H2O): 6    Post intubation assessment        Placement verified by: capnometry, equal breath sounds and chest rise        Number of attempts at approach: 1       Number of other approaches attempted: 0       Secured with: plastic tape       Ease of procedure: easy       Dentition: Intact and Unchanged

## 2021-09-07 ENCOUNTER — TELEPHONE (OUTPATIENT)
Dept: ORTHOPEDICS | Facility: CLINIC | Age: 51
End: 2021-09-07

## 2021-09-07 ENCOUNTER — NURSE TRIAGE (OUTPATIENT)
Dept: NURSING | Facility: CLINIC | Age: 51
End: 2021-09-07

## 2021-09-07 DIAGNOSIS — Z98.890 S/P SHOULDER SURGERY: Primary | ICD-10-CM

## 2021-09-07 DIAGNOSIS — M75.121 NONTRAUMATIC COMPLETE TEAR OF RIGHT ROTATOR CUFF: ICD-10-CM

## 2021-09-07 DIAGNOSIS — M19.011 ARTHRITIS OF RIGHT ACROMIOCLAVICULAR JOINT: ICD-10-CM

## 2021-09-07 RX ORDER — OXYCODONE HYDROCHLORIDE 5 MG/1
5-10 TABLET ORAL EVERY 4 HOURS PRN
Qty: 20 TABLET | Refills: 0 | Status: SHIPPED | OUTPATIENT
Start: 2021-09-07 | End: 2022-12-02

## 2021-09-07 RX ORDER — HYDROXYZINE HYDROCHLORIDE 25 MG/1
25 TABLET, FILM COATED ORAL EVERY 6 HOURS PRN
Qty: 30 TABLET | Refills: 1 | Status: SHIPPED | OUTPATIENT
Start: 2021-09-07 | End: 2022-12-02

## 2021-09-07 NOTE — TELEPHONE ENCOUNTER
Health Call Center    Phone Message    May a detailed message be left on voicemail: yes     Reason for Call: Medication Refill Request    Has the patient contacted the pharmacy for the refill? Yes   Name of medication being requested: Oxycodone + Hydoxyzine   Provider who prescribed the medication: Dr. Vadim Kaiser  Pharmacy: [facility] in [place] [facility] [address]   Date medication is needed: 9/7/2021       Please have someone on Dr. Kaiser's care team reach back out to this pt when this refill request has been placed.     The pt additionally had a question on the Gabapentin prescription he was taking prior to surgery. The pt is wondering whether he is suppose to resume taking that at night. The pt also has not had a bowel movement since before surgery. The pt has been trying to drink fluids and taken a stool softener. Please have someone reach out with a suggestion + clarification on these medications.     Action Taken: Message routed to:  Other: [facility] T[facility]    Travel Screening: Not Applicable

## 2021-09-07 NOTE — TELEPHONE ENCOUNTER
Triage call   Patient calling to request a refill for his oxycodone 5 mg and his hydroxyzine 25 mg . He has a days worth left of  Pain hayes He also is questioning if he should be taking his Gabapentin he was unclear if he should still be taking it. Pleas advise    He also is constipated has not had a stool since Friday.  He is taking  docusate sodium without relief.    Per protocol home care.Care advice given.  Verbalizes understanding and agrees with plan.  Routing  To Dr Elsa Figueroa RN   [facility] Nurse Advisor  4:39 PM 9/7/2021    COVID 19 Nurse Triage Plan/Patient Instructions    Please be aware that novel coronavirus (COVID-19) may be circulating in the community. If you develop symptoms such as fever, cough, or SOB or if you have concerns about the presence of another infection including coronavirus (COVID-19), please contact your health care provider or visit [email or web address].     Disposition/Instructions    Home care recommended. Follow home care protocol based instructions.    Thank you for taking steps to prevent the spread of this virus.  o Limit your contact with others.  o Wear a simple mask to cover your cough.  o Wash your hands well and often.    Resources    [facility]: About COVID-19: [email or web address]    CDC: What to Do If You're Sick: www.cdc.gov/coronavirus/2019-ncov/about/steps-when-sick.html    CDC: Ending Home Isolation: www.cdc.gov/coronavirus/2019-ncov/hcp/disposition-in-home-patients.html     CDC: Caring for Someone: www.cdc.gov/coronavirus/2019-ncov/if-you-are-sick/care-for-someone.html     [facility]: Interim Guidance for Hospital Discharge to Home: www.health.[facility].[place]/diseases/coronavirus/hcp/hospdischarge.pdf    [facility] clinical trials (COVID-19 research studies): [email or web address]     Below are the COVID-19 hotlines at the [place] Department of Health ([facility]). Interpreters are available.    o For health questions: Call 551-887-0289 or 1-597.141.1678 (7 a.m. to 7 p.m.)  o For questions about schools and childcare: Call 797-721-0372 or 1-812.285.4786 (7 a.m. to 7 p.m.)     Reason for Disposition    Mild constipation    Additional Information    Negative: Abdomen pain is the main symptom and adult male    Negative: Abdomen pain is the main symptom and adult female    Negative: Rectal bleeding or blood in stool is the main symptom    Negative: Patient sounds very sick or weak to the triager    Negative: Constant abdominal pain lasting > 2 hours    Negative: Vomiting bile (green color)    Negative: Vomiting and abdomen looks much more swollen than usual    Negative: Rectal pain or fullness from fecal impaction (rectum full of stool) and NOT better after SITZ bath, suppository or enema    Negative: Abdomen is more swollen than usual    Negative: Last bowel movement (BM) > 4 days ago    Negative: Leaking stool    Negative: Intermittent mild abdominal pain and fever    Negative: Unable to have a bowel movement (BM) without manually removing stool (using finger to pull out stool or perform disimpaction)    Negative: Unable to have a bowel movement (BM) without using a laxative, suppository, or enema    Negative: Constipation persists > 1 week and no improvement after using CARE ADVICE    Negative: Weight loss greater than 10 pounds (5 kg) and not dieting    Negative: Pencil-like, narrow stools    Negative: Patient wants to be seen    Negative: Uses laxative (e.g., PEG / Miralax. milk of magnesia) or enema more than once a month    Negative: Constipation is a recurrent ongoing problem (i.e., < 3 BMs / week or straining > 25% of the time)    Negative: Minor bleeding from rectum (e.g., blood just on toilet paper, few drops, streaks on surface of normal formed BM) occurs more than twice    Protocols used: CONSTIPATION-A-OH

## 2021-09-08 ENCOUNTER — MYC MEDICAL ADVICE (OUTPATIENT)
Dept: ORTHOPEDICS | Facility: CLINIC | Age: 51
End: 2021-09-08

## 2021-09-08 DIAGNOSIS — Z98.890 S/P SHOULDER SURGERY: Primary | ICD-10-CM

## 2021-09-08 RX ORDER — HYDROXYZINE PAMOATE 25 MG/1
CAPSULE ORAL
Qty: 50 CAPSULE | Refills: 0 | Status: SHIPPED | OUTPATIENT
Start: 2021-09-08 | End: 2022-12-02

## 2021-09-08 RX ORDER — HYDROXYZINE PAMOATE 25 MG/1
CAPSULE ORAL
Qty: 50 CAPSULE | Refills: 0 | Status: SHIPPED | OUTPATIENT
Start: 2021-09-08 | End: 2021-09-08

## 2021-09-08 RX ORDER — OXYCODONE AND ACETAMINOPHEN 5; 325 MG/1; MG/1
1 TABLET ORAL EVERY 6 HOURS PRN
Qty: 20 TABLET | Refills: 0 | Status: SHIPPED | OUTPATIENT
Start: 2021-09-08 | End: 2022-12-02

## 2021-09-09 RX ORDER — OXYCODONE AND ACETAMINOPHEN 5; 325 MG/1; MG/1
1 TABLET ORAL EVERY 6 HOURS PRN
Qty: 20 TABLET | Refills: 0 | Status: SHIPPED | OUTPATIENT
Start: 2021-09-09 | End: 2021-09-12

## 2021-09-09 NOTE — TELEPHONE ENCOUNTER
[facility] Call Center    Phone Message    May a detailed message be left on voicemail: yes     Reason for Call: Medication Question or concern regarding medication   Prescription Clarification  Name of Medication: Oxycodone   Prescribing Provider: Rafael    Pharmacy: [facility], [address]   What on the order needs clarification? N/A           Action Taken: Message routed to:  Other: [facility]    Travel Screening: Not Applicable     Patient's medication was sent to the wrong pharmacy and he is a little upset b/c he does not know why [facility] was chosen when he has never gone there -- is upset about the mix up and only has 1 pill left -- would like the script sent to the correct pharmacy and to be called back once that has been done

## 2021-09-12 DIAGNOSIS — Z98.890 S/P SHOULDER SURGERY: ICD-10-CM

## 2021-09-12 RX ORDER — OXYCODONE AND ACETAMINOPHEN 5; 325 MG/1; MG/1
1 TABLET ORAL EVERY 6 HOURS PRN
Qty: 20 TABLET | Refills: 0 | Status: SHIPPED | OUTPATIENT
Start: 2021-09-12 | End: 2022-12-02

## 2021-09-15 ENCOUNTER — TELEPHONE (OUTPATIENT)
Dept: ORTHOPEDICS | Facility: CLINIC | Age: 51
End: 2021-09-15

## 2021-09-15 NOTE — TELEPHONE ENCOUNTER
[facility] Call Center    Phone Message    May a detailed message be left on voicemail: yes     Reason for Call: Other: Patient requesting refill on oxycodone, will run out tomorrow. Also he was taking gabapentin before the surgery and is wondering if he should be taking it still. He has not been taking it since his surgery

## 2021-09-16 NOTE — TELEPHONE ENCOUNTER
I called Yvon Dunlap on 9/16/2021 at 10:55 AM. Refills were sent on 9/8 and 9/12.  He thinks he only picked up the 8th.  So, there should be a refill awaiting him at pharmacy.

## 2021-09-20 ENCOUNTER — OFFICE VISIT (OUTPATIENT)
Dept: ORTHOPEDICS | Facility: CLINIC | Age: 51
End: 2021-09-20
Payer: COMMERCIAL

## 2021-09-20 VITALS
HEART RATE: 81 BPM | WEIGHT: 225 LBS | RESPIRATION RATE: 16 BRPM | OXYGEN SATURATION: 95 % | DIASTOLIC BLOOD PRESSURE: 84 MMHG | SYSTOLIC BLOOD PRESSURE: 130 MMHG | BODY MASS INDEX: 32.58 KG/M2

## 2021-09-20 DIAGNOSIS — M75.41 IMPINGEMENT SYNDROME OF RIGHT SHOULDER: ICD-10-CM

## 2021-09-20 DIAGNOSIS — Z98.890 S/P SHOULDER SURGERY: Primary | ICD-10-CM

## 2021-09-20 DIAGNOSIS — M19.011 ARTHRITIS OF RIGHT ACROMIOCLAVICULAR JOINT: ICD-10-CM

## 2021-09-20 PROCEDURE — 99024 POSTOP FOLLOW-UP VISIT: CPT | Performed by: ORTHOPAEDIC SURGERY

## 2021-09-20 ASSESSMENT — PAIN SCALES - GENERAL: PAINLEVEL: NO PAIN (1)

## 2021-09-20 NOTE — PROGRESS NOTES
Follow up right [facility] on 9/3/21.  I could not locate any tear in rotator cuff.  Wound is healing well.  Suture removed.  Surgical findings discussed.  Pain is moderate.      Will start physical therapy for gentle passive range of motion, advance to active range of motion in 4 days.    Stop sling in 4 days  Start scar massage with vitamin-E cream.   May stop using the sling or immobilizer in 4 days.    Return to clinic 4 weeks to start strengthening.

## 2021-09-20 NOTE — PATIENT INSTRUCTIONS
Will start physical therapy for gentle passive range of motion, advance to active range of motion in 4 days.    Stop sling in 4 days  Start scar massage with vitamin-E cream.  Okay to shower now.  May remove steri-strips any time.  Return to clinic 4 weeks to start strengthening.

## 2021-09-20 NOTE — LETTER
9/20/2021         RE: Yvon Dunlap  [address] [address]        Dear Colleague,    Thank you for referring your patient, Yvon Dunlap, to the [facility]. Please see a copy of my visit note below.    Follow up right [facility] on 9/3/21.  I could not locate any tear in rotator cuff.  Wound is healing well.  Suture removed.  Surgical findings discussed.  Pain is moderate.      Will start physical therapy for gentle passive range of motion, advance to active range of motion in 4 days.    Stop sling in 4 days  Start scar massage with vitamin-E cream.   May stop using the sling or immobilizer in 4 days.    Return to clinic 4 weeks to start strengthening.            Again, thank you for allowing me to participate in the care of your patient.        Sincerely,        Vadim Kaiser MD

## 2021-09-21 ENCOUNTER — THERAPY VISIT (OUTPATIENT)
Dept: PHYSICAL THERAPY | Facility: CLINIC | Age: 51
End: 2021-09-21
Attending: ORTHOPAEDIC SURGERY
Payer: COMMERCIAL

## 2021-09-21 DIAGNOSIS — Z98.890 S/P SHOULDER SURGERY: ICD-10-CM

## 2021-09-21 PROCEDURE — 97110 THERAPEUTIC EXERCISES: CPT | Mod: GP | Performed by: PHYSICAL THERAPIST

## 2021-09-21 PROCEDURE — 97161 PT EVAL LOW COMPLEX 20 MIN: CPT | Mod: GP | Performed by: PHYSICAL THERAPIST

## 2021-09-21 PROCEDURE — 97140 MANUAL THERAPY 1/> REGIONS: CPT | Mod: GP | Performed by: PHYSICAL THERAPIST

## 2021-09-21 NOTE — PROGRESS NOTES
Physical Therapy Initial Evaluation  Subjective:  The history is provided by the patient.   Patient Health History  Yvon Dunlap being seen for post op June soriano Rt shldr, NO RC  repair .     Problem began: 9/3/2021.   Problem occurred: unknown , recent surgery    Pain is reported as 2/10 on pain scale.  General health as reported by patient is good.  Pertinent medical history includes: depression.   Red flags:  None as reported by patient.     Surgeries include:  Orthopedic surgery. Other surgery history details: 9.3.21 NM Rt shldr .    Current medications:  Pain medication.    Current occupation is Writer/ .                     Therapist Generated HPI Evaluation  Problem details: Pt reports he began to have Rt shldr pain in Dec 2020. He had PT 8 wks that failed to resolve his pain. He arrives today Rt UE in sling. Dr Kaiser's order are initial PROM and progress to AROM in the week. .         Type of problem:  Right shoulder.    This is a new condition.    Where condition occurred: at home.  Patient reports pain:  In the joint.  Pain is described as aching, sharp and shooting and is constant.  Pain is the same all the time.  Since onset symptoms are unchanged.  Associated symptoms:  Loss of motion/stiffness. Symptoms are exacerbated by carrying, lifting and using arm at shoulder level  and relieved by ice and rest.      Restrictions due to condition include:  Working in normal job with restrictions.  Barriers include:  None as reported by patient.                        Objective:  System                   Shoulder Evaluation:  ROM:  AROM:  not assessed                            PROM:    Flexion:  Right: 71      Abduction:  Right:  77    Internal Rotation:  Right:  45  External Rotation:  Right:  10+++      Elbow Flexion:  Right:  F  Elbow Extension:  Right:  -5        Pain: all end ranges           Palpation:  Palpation assessed shoulder: pects, scalenes, biceps     Right shoulder tenderness  present at: Supraspinatus; Subscapularis (severe!!! ); Levator and Upper Trap  Mobility Tests:    Glenohumeral anterior right:  Hypomobile  Glenohumeral posterior right:  Hypomobile  Glenohumeral inferior right:  Hypomobile    Acromioclavicular right:  Hypomobile    Scapulothoracic right:  Hypomobile  Sternoclavicular right:  Hypomobile  Scapulohumeral rhythm right:  Hypomobile                                   General     ROS    Assessment/Plan:    Patient is a 50 year old male with right side shoulder complaints.    Patient has the following significant findings with corresponding treatment plan.                Diagnosis 1:  S/p Rt shldr NM   Pain -  hot/cold therapy, manual therapy, self management and home program  Decreased ROM/flexibility - manual therapy, therapeutic exercise and home program  Decreased joint mobility - manual therapy, therapeutic exercise and home program  Decreased strength - therapeutic exercise and therapeutic activities  Decreased proprioception - neuro re-education and therapeutic activities  Inflammation - cold therapy  Impaired muscle performance - neuro re-education and home program  Decreased function - therapeutic activities and home program    Therapy Evaluation Codes:   1) History comprised of:   Personal factors that impact the plan of care:      Coping style, Overall behavior pattern and Past/current experiences.    Comorbidity factors that impact the plan of care are:      None.     Medications impacting care: Pain.  2) Examination of Body Systems comprised of:   Body structures and functions that impact the plan of care:      Shoulder.   Activity limitations that impact the plan of care are:      Bathing, Cooking, Driving, Dressing, Reading/Computer work, Working and Sleeping.  3) Clinical presentation characteristics are:   Stable/Uncomplicated.  4) Decision-Making    Low complexity using standardized patient assessment instrument and/or measureable assessment of functional  outcome.  Cumulative Therapy Evaluation is: Low complexity.    Previous and current functional limitations:  (See Goal Flow Sheet for this information)    Short term and Long term goals: (See Goal Flow Sheet for this information)     Communication ability:  Patient appears to be able to clearly communicate and understand verbal and written communication and follow directions correctly.  Treatment Explanation - The following has been discussed with the patient:   RX ordered/plan of care  Anticipated outcomes  Possible risks and side effects  This patient would benefit from PT intervention to resume normal activities.   Rehab potential is good.    Frequency:  1 X week, once daily  Duration:  for 12 weeks  Discharge Plan:  Achieve all LTG.  Independent in home treatment program.  Reach maximal therapeutic benefit.    Please refer to the daily flowsheet for treatment today, total treatment time and time spent performing 1:1 timed codes.

## 2021-09-27 ENCOUNTER — THERAPY VISIT (OUTPATIENT)
Dept: PHYSICAL THERAPY | Facility: CLINIC | Age: 51
End: 2021-09-27
Payer: COMMERCIAL

## 2021-09-27 DIAGNOSIS — M25.511 ACUTE PAIN OF RIGHT SHOULDER: Primary | ICD-10-CM

## 2021-09-27 DIAGNOSIS — Z98.890 S/P ARTHROSCOPY OF RIGHT SHOULDER: ICD-10-CM

## 2021-09-27 PROCEDURE — 97110 THERAPEUTIC EXERCISES: CPT | Mod: GP | Performed by: PHYSICAL THERAPIST

## 2021-09-27 PROCEDURE — 97140 MANUAL THERAPY 1/> REGIONS: CPT | Mod: GP | Performed by: PHYSICAL THERAPIST

## 2021-09-28 ENCOUNTER — TELEPHONE (OUTPATIENT)
Dept: OTOLARYNGOLOGY | Facility: CLINIC | Age: 51
End: 2021-09-28
Payer: COMMERCIAL

## 2021-09-29 ENCOUNTER — OFFICE VISIT (OUTPATIENT)
Dept: OTOLARYNGOLOGY | Facility: CLINIC | Age: 51
End: 2021-09-29
Payer: COMMERCIAL

## 2021-09-29 VITALS
BODY MASS INDEX: 32.72 KG/M2 | DIASTOLIC BLOOD PRESSURE: 89 MMHG | HEART RATE: 96 BPM | SYSTOLIC BLOOD PRESSURE: 129 MMHG | OXYGEN SATURATION: 97 % | RESPIRATION RATE: 18 BRPM | WEIGHT: 226 LBS

## 2021-09-29 DIAGNOSIS — H61.21 IMPACTED CERUMEN OF RIGHT EAR: Primary | ICD-10-CM

## 2021-09-29 PROCEDURE — 69210 REMOVE IMPACTED EAR WAX UNI: CPT | Mod: RT | Performed by: OTOLARYNGOLOGY

## 2021-09-29 NOTE — LETTER
9/29/2021         RE: Yvon Dunlap  [address] [facility] [address] [address]        Dear Colleague,    Thank you for referring your patient, Yvon Dunlap, to the [facility]. Please see a copy of my visit note below.    Cerumen Removal - Patient here for ear cleaning. Denies ear pain, drainage, and infection.     Physical Exam and Procedure  Ears - On examination of the ears, I found that that the right ear was impacted with cerumen.  Therefore, I positioned the patient in the examination chair in a semi-supine position. I used the binocular surgical microscope to perform cerumen removal.  On the right side, I began by using a cerumen loop to gently lift the edges of the cerumen mass away from the walls of the external canal.  Once I did this, I was able to pull away fragments of wax and debris. I also suctioned some remaining debri. I removed all the wax and debri.  The tympanic membrane was intact, no sign of perforation or middle ear effusion.    I turned my attention to the left side and it was clean. Left tympanic membrane intact. No effusion or infection.     A/P - right cerumen impaction. Right ear cleaned today in clinic. Return prn.         Again, thank you for allowing me to participate in the care of your patient.        Sincerely,        Yair Senior MD

## 2021-09-29 NOTE — PROGRESS NOTES
Cerumen Removal - Patient here for ear cleaning. Denies ear pain, drainage, and infection.     Physical Exam and Procedure  Ears - On examination of the ears, I found that that the right ear was impacted with cerumen.  Therefore, I positioned the patient in the examination chair in a semi-supine position. I used the binocular surgical microscope to perform cerumen removal.  On the right side, I began by using a cerumen loop to gently lift the edges of the cerumen mass away from the walls of the external canal.  Once I did this, I was able to pull away fragments of wax and debris. I also suctioned some remaining debri. I removed all the wax and debri.  The tympanic membrane was intact, no sign of perforation or middle ear effusion.    I turned my attention to the left side and it was clean. Left tympanic membrane intact. No effusion or infection.     A/P - right cerumen impaction. Right ear cleaned today in clinic. Return prn.

## 2021-10-05 ENCOUNTER — OFFICE VISIT (OUTPATIENT)
Dept: FAMILY MEDICINE | Facility: CLINIC | Age: 51
End: 2021-10-05
Payer: COMMERCIAL

## 2021-10-05 VITALS
TEMPERATURE: 97.7 F | WEIGHT: 227.6 LBS | HEART RATE: 86 BPM | DIASTOLIC BLOOD PRESSURE: 86 MMHG | OXYGEN SATURATION: 97 % | BODY MASS INDEX: 32.95 KG/M2 | SYSTOLIC BLOOD PRESSURE: 130 MMHG | RESPIRATION RATE: 24 BRPM

## 2021-10-05 DIAGNOSIS — Z23 NEED FOR PROPHYLACTIC VACCINATION AND INOCULATION AGAINST INFLUENZA: ICD-10-CM

## 2021-10-05 DIAGNOSIS — L91.8 SKIN TAG: Primary | ICD-10-CM

## 2021-10-05 PROCEDURE — 11200 RMVL SKIN TAGS UP TO&INC 15: CPT | Performed by: FAMILY MEDICINE

## 2021-10-05 PROCEDURE — 90471 IMMUNIZATION ADMIN: CPT | Performed by: FAMILY MEDICINE

## 2021-10-05 PROCEDURE — 90682 RIV4 VACC RECOMBINANT DNA IM: CPT | Performed by: FAMILY MEDICINE

## 2021-10-05 ASSESSMENT — PAIN SCALES - GENERAL: PAINLEVEL: MILD PAIN (2)

## 2021-10-05 NOTE — PROGRESS NOTES
Diagnosis Comments   1. Skin tag  REMOVAL OF SKIN TAGS, FIRST 15    2. Need for prophylactic vaccination and inoculation against influenza  INFLUENZA QUAD, RECOMBINANT, P-FREE (RIV4) (FLUBLOK)      The area under his right arm was cleaned with alcohol initially.  Then used ethyl chloride to numb it.  Then I used sharp scissor with tweezers I was able to cut small skin tag at the base. There was no complication, no bleeding.  Tolerated well.    Imani Sanz is a 51 year old who presents for the following health issues:  Small, skin tag, under right arm, having for the past one months, after he had his Right shoulder rotator cuff surgery.    Skin Lesion  Onset/Duration: about a month  Description  Location: upper arm of right hand  Color: brown and skin colored  Border description: raised  Character: painful  Itching: no  Bleeding:  no  Intensity:  severe  Progression of Symptoms:  same and constant  Accompanying signs and symptoms:   Bleeding: no  Scaling: no  Excessive sun exposure/tanning: no  Sunscreen used: no  History:           Any previous history of skin cancer: no  Any family history of melanoma: no  Previous episodes of similar lesion: no  Precipitating or alleviating factors: none  Therapies tried and outcome: none      Review of Systems   Constitutional, HEENT, cardiovascular, pulmonary, gi and gu systems are negative, except as otherwise noted.      Objective    There were no vitals taken for this visit.  There is no height or weight on file to calculate BMI.  Physical Exam   GENERAL: healthy, alert and no distress  SKIN: Right armpit,   Small skin tag.   PSYCH: mentation appears normal, affect normal/bright      Suri Galaviz MD

## 2021-10-11 ENCOUNTER — THERAPY VISIT (OUTPATIENT)
Dept: PHYSICAL THERAPY | Facility: CLINIC | Age: 51
End: 2021-10-11
Payer: COMMERCIAL

## 2021-10-11 DIAGNOSIS — Z98.890 S/P ARTHROSCOPY OF RIGHT SHOULDER: ICD-10-CM

## 2021-10-11 PROCEDURE — 97140 MANUAL THERAPY 1/> REGIONS: CPT | Mod: GP | Performed by: PHYSICAL THERAPIST

## 2021-10-11 PROCEDURE — 97110 THERAPEUTIC EXERCISES: CPT | Mod: GP | Performed by: PHYSICAL THERAPIST

## 2021-10-14 ENCOUNTER — THERAPY VISIT (OUTPATIENT)
Dept: PHYSICAL THERAPY | Facility: CLINIC | Age: 51
End: 2021-10-14
Payer: COMMERCIAL

## 2021-10-14 DIAGNOSIS — Z98.890 S/P ARTHROSCOPY OF RIGHT SHOULDER: ICD-10-CM

## 2021-10-14 PROCEDURE — 97140 MANUAL THERAPY 1/> REGIONS: CPT | Mod: GP | Performed by: PHYSICAL THERAPIST

## 2021-10-14 PROCEDURE — 97110 THERAPEUTIC EXERCISES: CPT | Mod: GP | Performed by: PHYSICAL THERAPIST

## 2021-10-22 ENCOUNTER — THERAPY VISIT (OUTPATIENT)
Dept: PHYSICAL THERAPY | Facility: CLINIC | Age: 51
End: 2021-10-22
Payer: COMMERCIAL

## 2021-10-22 DIAGNOSIS — Z98.890 S/P ARTHROSCOPY OF RIGHT SHOULDER: ICD-10-CM

## 2021-10-22 PROCEDURE — 97140 MANUAL THERAPY 1/> REGIONS: CPT | Mod: GP | Performed by: PHYSICAL THERAPIST

## 2021-10-22 PROCEDURE — 97110 THERAPEUTIC EXERCISES: CPT | Mod: GP | Performed by: PHYSICAL THERAPIST

## 2021-10-27 ENCOUNTER — THERAPY VISIT (OUTPATIENT)
Dept: PHYSICAL THERAPY | Facility: CLINIC | Age: 51
End: 2021-10-27
Payer: COMMERCIAL

## 2021-10-27 DIAGNOSIS — Z98.890 S/P ARTHROSCOPY OF RIGHT SHOULDER: ICD-10-CM

## 2021-10-27 PROCEDURE — 97140 MANUAL THERAPY 1/> REGIONS: CPT | Mod: GP | Performed by: PHYSICAL THERAPIST

## 2021-10-27 PROCEDURE — 97110 THERAPEUTIC EXERCISES: CPT | Mod: GP | Performed by: PHYSICAL THERAPIST

## 2021-11-01 ENCOUNTER — OFFICE VISIT (OUTPATIENT)
Dept: ORTHOPEDICS | Facility: CLINIC | Age: 51
End: 2021-11-01
Payer: COMMERCIAL

## 2021-11-01 VITALS
RESPIRATION RATE: 16 BRPM | HEIGHT: 70 IN | HEART RATE: 70 BPM | BODY MASS INDEX: 32.5 KG/M2 | SYSTOLIC BLOOD PRESSURE: 135 MMHG | DIASTOLIC BLOOD PRESSURE: 89 MMHG | WEIGHT: 227 LBS

## 2021-11-01 DIAGNOSIS — M19.011 ARTHRITIS OF RIGHT ACROMIOCLAVICULAR JOINT: ICD-10-CM

## 2021-11-01 DIAGNOSIS — Z98.890 S/P SHOULDER SURGERY: Primary | ICD-10-CM

## 2021-11-01 PROCEDURE — 99024 POSTOP FOLLOW-UP VISIT: CPT | Performed by: ORTHOPAEDIC SURGERY

## 2021-11-01 ASSESSMENT — MIFFLIN-ST. JEOR: SCORE: 1886.95

## 2021-11-01 NOTE — LETTER
11/1/2021         RE: Yvon Dunlap  [address] [id number]        Dear Colleague,    Thank you for referring your patient, Yvon Dunlap, to the [facility]. Please see a copy of my visit note below.    Follow up right Liu on 9/3/21.  I could not locate any tear in rotator cuff.    Pain is mild.   Wound: healing well  Range of Motion:  80-90% normal.  He has been able to lift grocery bags.         Assessment/Plan: Zana Smith doing well.  Will continue range of motion and start rotator cuff strengthening and scapular stabilization exercises with tubing.  Resume activity as tolerated.  Return to clinic 4 weeks.                      Again, thank you for allowing me to participate in the care of your patient.        Sincerely,        Vadim Kaiser MD

## 2021-11-01 NOTE — PROGRESS NOTES
Follow up right Alexa on 9/3/21.  I could not locate any tear in rotator cuff.    Pain is mild.   Wound: healing well  Range of Motion:  80-90% normal.  He has been able to lift grocery bags.         Assessment/Plan: right Zana Watkins doing well.  Will continue range of motion and start rotator cuff strengthening and scapular stabilization exercises with tubing.  Resume activity as tolerated.  Return to clinic 4 weeks.

## 2021-11-03 ENCOUNTER — THERAPY VISIT (OUTPATIENT)
Dept: PHYSICAL THERAPY | Facility: CLINIC | Age: 51
End: 2021-11-03
Payer: COMMERCIAL

## 2021-11-03 DIAGNOSIS — Z98.890 S/P ARTHROSCOPY OF RIGHT SHOULDER: ICD-10-CM

## 2021-11-03 DIAGNOSIS — Z98.890 S/P SHOULDER SURGERY: ICD-10-CM

## 2021-11-03 PROCEDURE — 97140 MANUAL THERAPY 1/> REGIONS: CPT | Mod: GP | Performed by: PHYSICAL THERAPIST

## 2021-11-03 PROCEDURE — 97110 THERAPEUTIC EXERCISES: CPT | Mod: GP | Performed by: PHYSICAL THERAPIST

## 2021-11-05 ENCOUNTER — THERAPY VISIT (OUTPATIENT)
Dept: PHYSICAL THERAPY | Facility: CLINIC | Age: 51
End: 2021-11-05
Payer: COMMERCIAL

## 2021-11-05 DIAGNOSIS — Z98.890 S/P ARTHROSCOPY OF RIGHT SHOULDER: ICD-10-CM

## 2021-11-05 PROCEDURE — 97110 THERAPEUTIC EXERCISES: CPT | Mod: GP | Performed by: PHYSICAL THERAPIST

## 2021-11-05 PROCEDURE — 97140 MANUAL THERAPY 1/> REGIONS: CPT | Mod: GP | Performed by: PHYSICAL THERAPIST

## 2021-11-08 ENCOUNTER — THERAPY VISIT (OUTPATIENT)
Dept: PHYSICAL THERAPY | Facility: CLINIC | Age: 51
End: 2021-11-08
Payer: COMMERCIAL

## 2021-11-08 DIAGNOSIS — Z98.890 S/P ARTHROSCOPY OF RIGHT SHOULDER: ICD-10-CM

## 2021-11-08 PROCEDURE — 97140 MANUAL THERAPY 1/> REGIONS: CPT | Mod: GP | Performed by: PHYSICAL THERAPIST

## 2021-11-08 PROCEDURE — 97110 THERAPEUTIC EXERCISES: CPT | Mod: GP | Performed by: PHYSICAL THERAPIST

## 2021-11-10 ENCOUNTER — THERAPY VISIT (OUTPATIENT)
Dept: PHYSICAL THERAPY | Facility: CLINIC | Age: 51
End: 2021-11-10
Payer: COMMERCIAL

## 2021-11-10 DIAGNOSIS — Z98.890 S/P ARTHROSCOPY OF RIGHT SHOULDER: ICD-10-CM

## 2021-11-10 PROCEDURE — 97140 MANUAL THERAPY 1/> REGIONS: CPT | Mod: GP | Performed by: PHYSICAL THERAPIST

## 2021-11-10 PROCEDURE — 97110 THERAPEUTIC EXERCISES: CPT | Mod: GP | Performed by: PHYSICAL THERAPIST

## 2021-11-15 ENCOUNTER — THERAPY VISIT (OUTPATIENT)
Dept: PHYSICAL THERAPY | Facility: CLINIC | Age: 51
End: 2021-11-15
Payer: COMMERCIAL

## 2021-11-15 DIAGNOSIS — Z98.890 S/P ARTHROSCOPY OF RIGHT SHOULDER: ICD-10-CM

## 2021-11-15 PROCEDURE — 97110 THERAPEUTIC EXERCISES: CPT | Mod: GP | Performed by: PHYSICAL THERAPIST

## 2021-11-15 PROCEDURE — 97140 MANUAL THERAPY 1/> REGIONS: CPT | Mod: GP | Performed by: PHYSICAL THERAPIST

## 2021-11-17 ENCOUNTER — THERAPY VISIT (OUTPATIENT)
Dept: PHYSICAL THERAPY | Facility: CLINIC | Age: 51
End: 2021-11-17
Payer: COMMERCIAL

## 2021-11-17 DIAGNOSIS — Z98.890 S/P ARTHROSCOPY OF RIGHT SHOULDER: ICD-10-CM

## 2021-11-17 PROCEDURE — 97110 THERAPEUTIC EXERCISES: CPT | Mod: GP | Performed by: PHYSICAL THERAPIST

## 2021-11-17 PROCEDURE — 97140 MANUAL THERAPY 1/> REGIONS: CPT | Mod: GP | Performed by: PHYSICAL THERAPIST

## 2021-11-22 ENCOUNTER — THERAPY VISIT (OUTPATIENT)
Dept: PHYSICAL THERAPY | Facility: CLINIC | Age: 51
End: 2021-11-22
Payer: COMMERCIAL

## 2021-11-22 DIAGNOSIS — Z98.890 S/P ARTHROSCOPY OF RIGHT SHOULDER: ICD-10-CM

## 2021-11-22 PROCEDURE — 97110 THERAPEUTIC EXERCISES: CPT | Mod: GP | Performed by: PHYSICAL THERAPIST

## 2021-11-22 PROCEDURE — 97140 MANUAL THERAPY 1/> REGIONS: CPT | Mod: GP | Performed by: PHYSICAL THERAPIST

## 2021-11-24 ENCOUNTER — THERAPY VISIT (OUTPATIENT)
Dept: PHYSICAL THERAPY | Facility: CLINIC | Age: 51
End: 2021-11-24
Payer: COMMERCIAL

## 2021-11-24 DIAGNOSIS — Z98.890 S/P ARTHROSCOPY OF RIGHT SHOULDER: ICD-10-CM

## 2021-11-24 PROCEDURE — 97110 THERAPEUTIC EXERCISES: CPT | Mod: GP | Performed by: PHYSICAL THERAPIST

## 2021-11-24 PROCEDURE — 97112 NEUROMUSCULAR REEDUCATION: CPT | Mod: GP | Performed by: PHYSICAL THERAPIST

## 2021-11-29 ENCOUNTER — THERAPY VISIT (OUTPATIENT)
Dept: PHYSICAL THERAPY | Facility: CLINIC | Age: 51
End: 2021-11-29
Payer: COMMERCIAL

## 2021-11-29 DIAGNOSIS — Z98.890 S/P ARTHROSCOPY OF RIGHT SHOULDER: ICD-10-CM

## 2021-11-29 PROCEDURE — 97110 THERAPEUTIC EXERCISES: CPT | Mod: GP | Performed by: PHYSICAL THERAPIST

## 2021-12-15 ENCOUNTER — THERAPY VISIT (OUTPATIENT)
Dept: PHYSICAL THERAPY | Facility: CLINIC | Age: 51
End: 2021-12-15
Payer: COMMERCIAL

## 2021-12-15 DIAGNOSIS — Z98.890 S/P ARTHROSCOPY OF RIGHT SHOULDER: ICD-10-CM

## 2021-12-15 PROCEDURE — 97110 THERAPEUTIC EXERCISES: CPT | Mod: GP | Performed by: PHYSICAL THERAPIST

## 2021-12-15 NOTE — PROGRESS NOTES
DISCHARGE REPORT    Progress reporting period is from 9.21.21 to 12.15.21.       SUBJECTIVE  Subjective changes noted by patient:  .  Subjective: no pain cont my HEP, ready for d/c     Current pain level is 1/10 Current Pain level: 0/10.     Previous pain level was  7/10  .   Changes in function:  Yes (See Goal flowsheet attached for changes in current functional level)  Adverse reaction to treatment or activity: None    OBJECTIVE  Changes noted in objective findings:  Yes, all goals met   Objective: 4-4+/5 mmt, full AROM, full PROM, indep HEP      ASSESSMENT/PLAN  Updated problem list and treatment plan: Diagnosis 1:  Rt shoulder pain post op  Decreased strength - home program  STG/LTGs have been met or progress has been made towards goals:  Yes (See Goal flow sheet completed today.)  Assessment of Progress: The patient has met all of their long term goals.  Self Management Plans:  Patient is independent in a home treatment program.  Patient is independent in self management of symptoms.    Yvon continues to require the following intervention to meet STG and LTG's:      Recommendations:  This patient is ready to be discharged from therapy and continue their home treatment program.    Please refer to the daily flowsheet for treatment today, total treatment time and time spent performing 1:1 timed codes.

## 2021-12-16 ENCOUNTER — IMMUNIZATION (OUTPATIENT)
Dept: NURSING | Facility: CLINIC | Age: 51
End: 2021-12-16
Payer: COMMERCIAL

## 2021-12-16 PROCEDURE — 0004A PR COVID VAC PFIZER DIL RECON 30 MCG/0.3 ML IM: CPT

## 2021-12-16 PROCEDURE — 91300 PR COVID VAC PFIZER DIL RECON 30 MCG/0.3 ML IM: CPT

## 2022-02-13 ENCOUNTER — HEALTH MAINTENANCE LETTER (OUTPATIENT)
Age: 52
End: 2022-02-13

## 2022-08-24 ENCOUNTER — OFFICE VISIT (OUTPATIENT)
Dept: URGENT CARE | Facility: URGENT CARE | Age: 52
End: 2022-08-24
Payer: COMMERCIAL

## 2022-08-24 VITALS
BODY MASS INDEX: 33.74 KG/M2 | DIASTOLIC BLOOD PRESSURE: 90 MMHG | OXYGEN SATURATION: 97 % | HEART RATE: 86 BPM | SYSTOLIC BLOOD PRESSURE: 144 MMHG | TEMPERATURE: 98.4 F | WEIGHT: 233.5 LBS

## 2022-08-24 DIAGNOSIS — L30.9 ECZEMA, UNSPECIFIED TYPE: Primary | ICD-10-CM

## 2022-08-24 DIAGNOSIS — M25.561 ACUTE PAIN OF RIGHT KNEE: ICD-10-CM

## 2022-08-24 PROCEDURE — 99214 OFFICE O/P EST MOD 30 MIN: CPT | Performed by: NURSE PRACTITIONER

## 2022-08-24 RX ORDER — TRIAMCINOLONE ACETONIDE 1 MG/G
CREAM TOPICAL 3 TIMES DAILY
Qty: 453.6 G | Refills: 0 | Status: SHIPPED | OUTPATIENT
Start: 2022-08-24 | End: 2022-09-23

## 2022-08-24 NOTE — PROGRESS NOTES
Chief Complaint   Patient presents with     Knee Pain     Pt has an injured rt knee, about 2 days ago pt bumped knee, theres a bruise and painful to bend, trouble walking      Musculoskeletal Problem     Pt has a rash on front of both calves, has been using antibiotics, has had this before and needs a refill for the antibiotic,triamcinolone acetonide     SUBJECTIVE:  Yvon Dunlap is a 51 year old male who presents to the clinic today with right anterior knee pain for a couple days, stabbing with bending, tender to touch tibial tuberosity. He does not recall injury, overuse, redness, warmth, swelling. No relevant PMH. Also needs refill of triamcinolone for shin rash. It is red, flaking, bleeding, itches, burns.    Past Medical History:   Diagnosis Date     Adhesive capsulitis of right shoulder 4/26/2021     ALLERGIC RHINITIS NOS 5/8/2006     Depressive disorder Sometime in the 1990s, I think     Depressive disorder, not elsewhere classified      Family history of colon cancer      Other and unspecified hyperlipidemia      acetaminophen (TYLENOL) 500 MG tablet, Take 2 tablets (1,000 mg) by mouth every 6 hours as needed for mild pain  Multiple Vitamin (DAILY MULTIVITAMIN PO), Take  by mouth daily.  propranolol (INDERAL) 20 MG tablet, TAKE 1 TO 2 TABLETS, 30 MINUTES BEFORE ANXIETY INDUCING EVENT  Pseudoeph-Doxylamine-DM-APAP (NYQUIL PO), Take by mouth daily as needed   sildenafil (VIAGRA) 100 MG tablet, Take 1 tablet (100 mg) by mouth daily as needed TAKE 1 TABLET BY MOUTH DAILY AS NEEDED. TAKE 30 MINS-4 HRS PRIOR TO INTERCOURSE  triamcinolone (KENALOG) 0.1 % external cream, Apply topically 2 times daily as needed for irritation Apply sparingly to affected area three times daily as needed  TYLENOL 325 MG OR TABS, ONE TO TWO TABLETS EVERY 4 TO 6 HOURS AS NEEDED FOR PAIN  buPROPion (WELLBUTRIN XL) 150 MG 24 hr tablet, Take 1 tablet (150 mg) by mouth every morning (Take in addition to 300 mg dose) (Patient not  taking: Reported on 8/24/2022)  CLARITIN 10 MG OR TABS, 1 TABLET DAILY prn for allergies (Patient not taking: Reported on 8/24/2022)  gabapentin (NEURONTIN) 300 MG capsule, TAKE 1-2 CAPSULES (300-600 MG) BY MOUTH NIGHTLY AS NEEDED (PAIN AT BEDTIME) (Patient not taking: Reported on 8/24/2022)  hydrOXYzine (ATARAX) 25 MG tablet, Take 1 tablet (25 mg) by mouth every 6 hours as needed (spasm) (Patient not taking: Reported on 8/24/2022)  hydrOXYzine (VISTARIL) 25 MG capsule, Take 1 capsule every 6 hours by mouth as needed to assist with pain control. (Patient not taking: Reported on 8/24/2022)  naloxone (NARCAN) 4 MG/0.1ML nasal spray, Spray 1 spray (4 mg) into one nostril alternating nostrils once as needed for opioid reversal every 2-3 minutes until assistance arrives (Patient not taking: Reported on 8/24/2022)  oxyCODONE (ROXICODONE) 5 MG tablet, Take 1-2 tablets (5-10 mg) by mouth every 4 hours as needed for moderate to severe pain (Patient not taking: Reported on 8/24/2022)  oxyCODONE-acetaminophen (PERCOCET) 5-325 MG tablet, Take 1 tablet by mouth every 6 hours as needed for pain (Patient not taking: Reported on 8/24/2022)  oxyCODONE-acetaminophen (PERCOCET) 5-325 MG tablet, Take 1 tablet by mouth every 6 hours as needed for pain (Patient not taking: Reported on 8/24/2022)  phenylephrine (RONY-SYNEPHRINE) 0.25 % nasal spray, Spray 1 spray into both nostrils every 4 hours as needed for congestion or other (Nose bleed) (Patient not taking: Reported on 8/24/2022)    No current facility-administered medications on file prior to visit.    Social History     Tobacco Use     Smoking status: Never Smoker     Smokeless tobacco: Never Used   Substance Use Topics     Alcohol use: Yes     Comment: moderate, 2 beers per day     No Known Allergies    Review of Systems  All systems negative except for those listed above in HPI.    EXAM:   BP (!) 144/90 (BP Location: Right arm, Patient Position: Sitting, Cuff Size: Adult Regular)    Pulse 86   Temp 98.4  F (36.9  C) (Temporal)   Wt 105.9 kg (233 lb 8 oz)   SpO2 97%   BMI 33.74 kg/m      Physical Exam  Vitals reviewed.   Constitutional:       Appearance: Normal appearance.   HENT:      Head: Normocephalic and atraumatic.      Nose: Nose normal.      Mouth/Throat:      Mouth: Mucous membranes are moist.      Pharynx: Oropharynx is clear.   Eyes:      Extraocular Movements: Extraocular movements intact.      Conjunctiva/sclera: Conjunctivae normal.      Pupils: Pupils are equal, round, and reactive to light.   Cardiovascular:      Rate and Rhythm: Normal rate.   Pulmonary:      Effort: Pulmonary effort is normal.   Musculoskeletal:         General: Tenderness present. No swelling. Normal range of motion.      Cervical back: Normal range of motion and neck supple.      Right lower leg: No edema.      Left lower leg: No edema.   Skin:     General: Skin is warm and dry.      Findings: Erythema and rash present. No bruising.   Neurological:      General: No focal deficit present.      Mental Status: He is alert and oriented to person, place, and time.   Psychiatric:         Mood and Affect: Mood normal.         Behavior: Behavior normal.       Xray done in clinic read by me as positive for degenerative changes in knee.    ASSESSMENT:    ICD-10-CM    1. Eczema, unspecified type  L30.9 triamcinolone (KENALOG) 0.1 % external cream   2. Acute pain of right knee  M25.561 XR Knee Right 3 Views     PLAN:    Arthritis of knee  Rest, ice, compression, elevate  Rotate tylenol and ibuprofen as needed  Walking, healthy diet, exercise  Triamcinolone for rash  Derm if lingering or worsening    Follow up with primary care provider with any problems, questions or concerns or if symptoms worsen or fail to improve. Patient agreed to plan and verbalized understanding.    Faye Bansal, LEIGH ANN-BC  [facility]

## 2022-09-15 ENCOUNTER — IMMUNIZATION (OUTPATIENT)
Dept: NURSING | Facility: CLINIC | Age: 52
End: 2022-09-15
Payer: COMMERCIAL

## 2022-09-15 PROCEDURE — 90471 IMMUNIZATION ADMIN: CPT

## 2022-09-15 PROCEDURE — 90682 RIV4 VACC RECOMBINANT DNA IM: CPT

## 2022-09-15 PROCEDURE — 0124A COVID-19,PF,PFIZER BOOSTER BIVALENT: CPT

## 2022-09-15 PROCEDURE — 91312 COVID-19,PF,PFIZER BOOSTER BIVALENT: CPT

## 2022-11-14 ENCOUNTER — OFFICE VISIT (OUTPATIENT)
Dept: FAMILY MEDICINE | Facility: CLINIC | Age: 52
End: 2022-11-14
Payer: COMMERCIAL

## 2022-11-14 VITALS
TEMPERATURE: 98.1 F | DIASTOLIC BLOOD PRESSURE: 88 MMHG | SYSTOLIC BLOOD PRESSURE: 136 MMHG | OXYGEN SATURATION: 96 % | HEART RATE: 97 BPM | WEIGHT: 236.8 LBS | RESPIRATION RATE: 22 BRPM | BODY MASS INDEX: 35.07 KG/M2 | HEIGHT: 69 IN

## 2022-11-14 DIAGNOSIS — Z23 NEED FOR SHINGLES VACCINE: ICD-10-CM

## 2022-11-14 DIAGNOSIS — Z12.11 SCREEN FOR COLON CANCER: ICD-10-CM

## 2022-11-14 DIAGNOSIS — L91.8 SKIN TAG: Primary | ICD-10-CM

## 2022-11-14 PROCEDURE — 11200 RMVL SKIN TAGS UP TO&INC 15: CPT | Performed by: FAMILY MEDICINE

## 2022-11-14 PROCEDURE — 90471 IMMUNIZATION ADMIN: CPT | Performed by: FAMILY MEDICINE

## 2022-11-14 PROCEDURE — 90750 HZV VACC RECOMBINANT IM: CPT | Performed by: FAMILY MEDICINE

## 2022-11-14 PROCEDURE — 90472 IMMUNIZATION ADMIN EACH ADD: CPT | Performed by: FAMILY MEDICINE

## 2022-11-14 PROCEDURE — 90715 TDAP VACCINE 7 YRS/> IM: CPT | Performed by: FAMILY MEDICINE

## 2022-11-14 ASSESSMENT — PATIENT HEALTH QUESTIONNAIRE - PHQ9
SUM OF ALL RESPONSES TO PHQ QUESTIONS 1-9: 2
10. IF YOU CHECKED OFF ANY PROBLEMS, HOW DIFFICULT HAVE THESE PROBLEMS MADE IT FOR YOU TO DO YOUR WORK, TAKE CARE OF THINGS AT HOME, OR GET ALONG WITH OTHER PEOPLE: NOT DIFFICULT AT ALL
SUM OF ALL RESPONSES TO PHQ QUESTIONS 1-9: 2

## 2022-11-14 ASSESSMENT — PAIN SCALES - GENERAL: PAINLEVEL: NO PAIN (0)

## 2022-11-14 NOTE — PROGRESS NOTES
"  Assessment & Plan     Skin tag  After obtaining verbal consent, the area of the skin tag were cleaned with alcohol swab.  Then I used liquid nitrogen, 3 applications applied  to each area, tolerated well.  No complication.  Applied a small amount of Bacitracin.  Given instruction to keep clean, apply Bacitracin tid for the next 3- 5 days.  - DESTRUCT BENIGN LESION, UP TO 14    Screen for colon cancer  Screening.  - Colonoscopy Screening  Referral; Future    Need for shingles vaccine  Up dated his immunizations, given Tdap and Zoster  - SCREENING QUESTIONS FOR ADULT IMMUNIZATIONS    BMI:   Estimated body mass index is 34.48 kg/m  as calculated from the following:    Height as of this encounter: 1.765 m (5' 9.49\").    Weight as of this encounter: 107.4 kg (236 lb 12.8 oz).   Weight management plan: Discussed healthy diet and exercise guidelines    Work on weight loss  Regular exercise    Return in about 3 months (around 2/14/2023) for Routine preventive, in person.    Suri Galaviz MD  [facility]   Yvon is a 52 year old, presenting for the following health issues:  Skin Tags  Multiple skin tag, the most visible one, on  his left forehead area, left cheek area.  Enlarged, more irritated.    Due for colon cancer screening  Due for annual physical exam.    History of Present Illness       Reason for visit:  Skin tags  Symptom onset:  More than a month  Symptoms include:  Skin tags on face and neck  Symptom intensity:  Mild  Symptom progression:  Staying the same  Had these symptoms before:  No    He eats 2-3 servings of fruits and vegetables daily.He consumes 2 sweetened beverage(s) daily.He exercises with enough effort to increase his heart rate 10 to 19 minutes per day.  He exercises with enough effort to increase his heart rate 3 or less days per week.   He is taking medications regularly.    Today's PHQ-9         PHQ-9 Total Score: 2    PHQ-9 Q9 Thoughts " "of better off dead/self-harm past 2 weeks :   Not at all    How difficult have these problems made it for you to do your work, take care of things at home, or get along with other people: Not difficult at all      Review of Systems   Constitutional, HEENT, cardiovascular, pulmonary, GI, , musculoskeletal, neuro, skin, endocrine and psych systems are negative, except as otherwise noted.      Objective    Pulse 97   Temp 98.1  F (36.7  C) (Tympanic)   Resp 22   Ht 1.765 m (5' 9.49\")   Wt 107.4 kg (236 lb 12.8 oz)   SpO2 96%   BMI 34.48 kg/m    Body mass index is 34.48 kg/m .  Physical Exam   GENERAL: healthy, alert and no distress  SKIN: small , 2 skin tags, left forehead and left cheek,  PSYCH: mentation appears normal, affect normal/bright    Orders Placed This Encounter   Procedures     DESTRUCT BENIGN LESION, UP TO 14     REVIEW OF HEALTH MAINTENANCE PROTOCOL ORDERS     SCREENING QUESTIONS FOR ADULT IMMUNIZATIONS     ZOSTER VACCINE RECOMBINANT ADJUVANTED (SHINGRIX)     TDAP VACCINE (Adacel, Boostrix)     Colonoscopy Screening  Referral         Suri Galaviz MD            "

## 2022-11-22 ENCOUNTER — NURSE TRIAGE (OUTPATIENT)
Dept: FAMILY MEDICINE | Facility: CLINIC | Age: 52
End: 2022-11-22

## 2022-11-22 NOTE — TELEPHONE ENCOUNTER
"CARE ADVICE: YOU CAN MANAGE THIS AT HOME.     RN reviewed Home care and when patient should call clinic back.    RN assisted patient in scheduling appointment for Friday.          RN received call from patient.    Patient has had cough for approximatly 2 weeks.    Denies fever.    Postnasal drip and sore throat.    Noticing cough getting worse past several days.    Both daughters sick as well.    RN advised home care however, did assist patient in scheduling appointment.    Bruce Santiago, RN, BSN, PHN  [facility]      Reason for Disposition    Cough with cold symptoms (e.g., runny nose, postnasal drip, throat clearing)    Answer Assessment - Initial Assessment Questions  1. ONSET: \"When did the cough begin?\"         Couple of weeks.  Worsening over past 4-5 days  2. SEVERITY: \"How bad is the cough today?\"       Constant cough throughout day. Keeping him up a little bit  3. SPUTUM: \"Describe the color of your sputum\" (none, dry cough; clear, white, yellow, green)      Last day or so more phlegm.  Clear.  4. HEMOPTYSIS: \"Are you coughing up any blood?\" If so ask: \"How much?\" (flecks, streaks, tablespoons, etc.)      no  5. DIFFICULTY BREATHING: \"Are you having difficulty breathing?\" If Yes, ask: \"How bad is it?\" (e.g., mild, moderate, severe)     - MILD: No SOB at rest, mild SOB with walking, speaks normally in sentences, can lie down, no retractions, pulse < 100.     - MODERATE: SOB at rest, SOB with minimal exertion and prefers to sit, cannot lie down flat, speaks in phrases, mild retractions, audible wheezing, pulse 100-120.     - SEVERE: Very SOB at rest, speaks in single words, struggling to breathe, sitting hunched forward, retractions, pulse > 120     Starting to have SOB.  Able to due daily activities    Chest irritation from coughing.        6. FEVER: \"Do you have a fever?\" If Yes, ask: \"What is your temperature, how was it measured, and when did it start?\"      No fevers      7. CARDIAC " "HISTORY: \"Do you have any history of heart disease?\" (e.g., heart attack, congestive heart failure)         8. LUNG HISTORY: \"Do you have any history of lung disease?\"  (e.g., pulmonary embolus, asthma, emphysema)      No  9. PE RISK FACTORS: \"Do you have a history of blood clots?\" (or: recent major surgery, recent prolonged travel, bedridden)        no  10. OTHER SYMPTOMS: \"Do you have any other symptoms?\" (e.g., runny nose, wheezing, chest pain)        No wheezing,  Post nasal drip.  Sore throat from coughing and post nasal  11. PREGNANCY: \"Is there any chance you are pregnant?\" \"When was your last menstrual period?\"        NA  12. TRAVEL: \"Have you traveled out of the country in the last month?\" (e.g., travel history, exposures)        none    Protocols used: COUGH-A-[place]    Bruce Santiago RN, BSN, PHN  [facility]  "

## 2022-12-02 ENCOUNTER — VIRTUAL VISIT (OUTPATIENT)
Dept: FAMILY MEDICINE | Facility: OTHER | Age: 52
End: 2022-12-02
Payer: COMMERCIAL

## 2022-12-02 ENCOUNTER — NURSE TRIAGE (OUTPATIENT)
Dept: NURSING | Facility: CLINIC | Age: 52
End: 2022-12-02

## 2022-12-02 DIAGNOSIS — J40 BRONCHITIS: Primary | ICD-10-CM

## 2022-12-02 DIAGNOSIS — Z12.11 SCREEN FOR COLON CANCER: ICD-10-CM

## 2022-12-02 PROCEDURE — 99213 OFFICE O/P EST LOW 20 MIN: CPT | Mod: 95 | Performed by: FAMILY MEDICINE

## 2022-12-02 RX ORDER — AZITHROMYCIN 250 MG/1
TABLET, FILM COATED ORAL
Qty: 6 TABLET | Refills: 0 | Status: SHIPPED | OUTPATIENT
Start: 2022-12-02 | End: 2022-12-07

## 2022-12-02 RX ORDER — BENZONATATE 100 MG/1
100 CAPSULE ORAL 3 TIMES DAILY PRN
Qty: 30 CAPSULE | Refills: 1 | Status: SHIPPED | OUTPATIENT
Start: 2022-12-02

## 2022-12-02 NOTE — TELEPHONE ENCOUNTER
The patient is calling back and now states that his symptoms are worse    States that he has had a cough for a month    He is having bad coughing jag every 15 minutes - 0835    Denies a fever    His throat feels dry but states the he is coughing up stuff    Is not short of breath    Denies any chest pain or pressure    Is not coughing up any blood    He can hear a wheezing sound when he lies down or when he is trying to sleep    Denies any nasal congestion    Had Covid in April    Denies any blue lips or gray face    Has been doing mucinex, cough drops, hydration,     Per protocol patient should be seen today or tomorrow    Steph Randhawa RN  [facility] Nurse Advisor  8:41 AM  12/2/2022        Reason for Disposition    Continuous (nonstop) coughing interferes with work or school and no improvement using cough treatment per Care Advice    Additional Information    Negative: Bluish (or gray) lips or face    Negative: SEVERE difficulty breathing (e.g., struggling for each breath, speaks in single words)    Negative: Rapid onset of cough and has hives    Negative: Coughing started suddenly after medicine, an allergic food or bee sting    Negative: Difficulty breathing after exposure to flames, smoke, or fumes    Negative: Sounds like a life-threatening emergency to the triager    Negative: MODERATE difficulty breathing (e.g., speaks in phrases, SOB even at rest, pulse 100-120) and still present when not coughing    Negative: Chest pain present when not coughing    Negative: Passed out (i.e., fainted, collapsed and was not responding)    Negative: Increasing ankle swelling    Negative: Fever > 100.0 F (37.8 C) and bedridden (e.g., nursing home patient, stroke, chronic illness, recovering from surgery)    Negative: Fever > 100.0 F (37.8 C) and has diabetes mellitus or a weak immune system (e.g., HIV positive, cancer chemotherapy, organ transplant, splenectomy, chronic steroids)    Negative: Fever > 101 F (38.3 C) and  over 60 years of age    Negative: Fever > 103 F (39.4 C)    Negative: Coughed up > 1 tablespoon (15 ml) blood (Exception: Blood-tinged sputum.)    Negative: MILD difficulty breathing (e.g., minimal/no SOB at rest, SOB with walking, pulse <100) and still present when not coughing    Negative: Wheezing is present    Negative: SEVERE coughing spells (e.g., whooping sound after coughing, vomiting after coughing)    Negative: Coughing up karol-colored (reddish-brown) or blood-tinged sputum    Negative: Fever present > 3 days (72 hours)    Negative: Fever returns after gone for over 24 hours and symptoms worse or not improved    Negative: Using nasal washes and pain medicine > 24 hours and sinus pain persists    Negative: Known COPD or other severe lung disease (i.e., bronchiectasis, cystic fibrosis, lung surgery) and worsening symptoms (i.e., increased sputum purulence or amount, increased breathing difficulty)    Protocols used: COUGH-A-OH

## 2022-12-02 NOTE — PROGRESS NOTES
Yvon is a 52 year old who is being evaluated via a billable video visit.      How would you like to obtain your AVS? MyChart  If the video visit is dropped, the invitation should be resent by: Text to cell phone: [phone]  Will anyone else be joining your video visit? No    Assessment & Plan         Bronchitis  Was consistent with subacute bronchitis.  Antibiotics and cough suppressants as prescribed for symptomatic relief.  Discussed home care  Reportable signs and symptoms discussed  RTC if symptoms persist or fail to improve    - azithromycin (ZITHROMAX) 250 MG tablet; Take 2 tablets (500 mg) by mouth daily for 1 day, THEN 1 tablet (250 mg) daily for 4 days.  - benzonatate (TESSALON) 100 MG capsule; Take 1 capsule (100 mg) by mouth 3 times daily as needed for cough      No follow-ups on file.    Carolee Lee MD  [facility]   Yvon is a 52 year old, presenting for the following health issues:  No chief complaint on file.      HPI     Acute Illness  Acute illness concerns: Ongoing Cough x 1 Month   Onset/Duration:   Symptoms:  Fever: No  Chills/Sweats: No  Headache (location?): No  Sinus Pressure: No  Conjunctivitis:  No  Ear Pain: no  Rhinorrhea: No  Congestion: No  Sore Throat: YES  Cough: YES  Wheeze: YES  Decreased Appetite: No  Nausea: No  Vomiting: No  Diarrhea: No  Dysuria/Freq.: No  Dysuria or Hematuria: No  Fatigue/Achiness: No  Sick/Strep Exposure: No  Therapies tried and outcome: Mucinex, cough drops, extra hydration    Daughter has the same symptoms. She was given Zpack.       Review of Systems   Constitutional, HEENT, cardiovascular, pulmonary, GI, , musculoskeletal, neuro, skin, endocrine and psych systems are negative, except as otherwise noted.      Objective    Vitals - Patient Reported  Pain Score: No Pain (0)      Vitals:  No vitals were obtained today due to virtual visit.    Physical Exam   GENERAL: Healthy, alert and no  distress  EYES: Eyes grossly normal to inspection.  No discharge or erythema, or obvious scleral/conjunctival abnormalities.  RESP: No audible wheeze, cough, or visible cyanosis.  No visible retractions or increased work of breathing.    SKIN: Visible skin clear. No significant rash, abnormal pigmentation or lesions.  NEURO: Cranial nerves grossly intact.  Mentation and speech appropriate for age.  PSYCH: Mentation appears normal, affect normal/bright, judgement and insight intact, normal speech and appearance well-groomed.      Video-Visit Details    Video Start Time: 4:20PM    Type of service:  Video Visit    Video End Time:4:29 PM    Originating Location (pt. Location): Home        Distant Location (provider location):  On-site    Platform used for Video Visit: Simio

## 2023-01-06 ENCOUNTER — OFFICE VISIT (OUTPATIENT)
Dept: URGENT CARE | Facility: URGENT CARE | Age: 53
End: 2023-01-06
Payer: COMMERCIAL

## 2023-01-06 VITALS
WEIGHT: 238.2 LBS | SYSTOLIC BLOOD PRESSURE: 148 MMHG | HEART RATE: 92 BPM | OXYGEN SATURATION: 96 % | BODY MASS INDEX: 34.1 KG/M2 | DIASTOLIC BLOOD PRESSURE: 104 MMHG | TEMPERATURE: 97.9 F | HEIGHT: 70 IN

## 2023-01-06 DIAGNOSIS — S69.91XA INJURY OF RIGHT THUMB, INITIAL ENCOUNTER: Primary | ICD-10-CM

## 2023-01-06 PROCEDURE — 99213 OFFICE O/P EST LOW 20 MIN: CPT | Performed by: PHYSICIAN ASSISTANT

## 2023-01-06 ASSESSMENT — ENCOUNTER SYMPTOMS
MYALGIAS: 0
NAUSEA: 0
HEMATURIA: 0
DYSURIA: 0
NEUROLOGICAL NEGATIVE: 1
COUGH: 0
WOUND: 1
ABDOMINAL PAIN: 0
CHILLS: 0
VOMITING: 0
SORE THROAT: 0
SHORTNESS OF BREATH: 0
FREQUENCY: 0
PALPITATIONS: 0
ALLERGIC/IMMUNOLOGIC NEGATIVE: 1
DIARRHEA: 0
CONSTITUTIONAL NEGATIVE: 1
MUSCULOSKELETAL NEGATIVE: 1
GASTROINTESTINAL NEGATIVE: 1
RESPIRATORY NEGATIVE: 1
HEADACHES: 0
WHEEZING: 0
CHEST TIGHTNESS: 0
FEVER: 0
CARDIOVASCULAR NEGATIVE: 1

## 2023-01-06 NOTE — PROGRESS NOTES
"Chief Complaint:    Chief Complaint   Patient presents with     Wound Check     Pt said that he has a cut on his right thumb for about a month that's not healing.      Medical Decision Making:    Vital signs reviewed by Jean-Claude Juares PA-C  BP (!) 148/104 (BP Location: Left arm, Patient Position: Sitting, Cuff Size: Adult Large)   Pulse 92   Temp 97.9  F (36.6  C) (Tympanic)   Ht 1.778 m (5' 10\")   Wt 108 kg (238 lb 3.2 oz)   SpO2 96%   BMI 34.18 kg/m      ASSESSMENT:     1. Injury of right thumb, initial encounter      PLAN:     Patient is in no acute distress.  Full range of motion of thumb.  No indication of infection.  Excess skin removed with small scissors.  Sterile dressing applied.  Patient instructed to follow up with PCP in 2 weeks if symptoms are not improving.  Sooner if symptoms worsen.  Worrisome symptoms discussed with instructions to go to the ED.  Patient verbalized understanding and agreed with this plan.    Labs:     No results found for any visits on 01/06/23.    Current Meds:    Current Outpatient Medications:      benzonatate (TESSALON) 100 MG capsule, Take 1 capsule (100 mg) by mouth 3 times daily as needed for cough, Disp: 30 capsule, Rfl: 1     buPROPion (WELLBUTRIN XL) 150 MG 24 hr tablet, Take 1 tablet (150 mg) by mouth every morning (Take in addition to 300 mg dose), Disp: 90 tablet, Rfl: 3     CLARITIN 10 MG OR TABS, 1 TABLET DAILY prn for allergies, Disp: , Rfl:      Multiple Vitamin (DAILY MULTIVITAMIN PO), Take  by mouth daily., Disp: , Rfl:      propranolol (INDERAL) 20 MG tablet, TAKE 1 TO 2 TABLETS, 30 MINUTES BEFORE ANXIETY INDUCING EVENT, Disp: 90 tablet, Rfl: 0     sildenafil (VIAGRA) 100 MG tablet, Take 1 tablet (100 mg) by mouth daily as needed TAKE 1 TABLET BY MOUTH DAILY AS NEEDED. TAKE 30 MINS-4 HRS PRIOR TO INTERCOURSE, Disp: 12 tablet, Rfl: 3     triamcinolone (KENALOG) 0.1 % external cream, Apply topically 2 times daily as needed for irritation Apply sparingly to " affected area three times daily as needed, Disp: 45 g, Rfl: 1    Allergies:  No Known Allergies    SUBJECTIVE    HPI: Yvon Randall is an 52 year old male who presents for evaluation and treatment of laceration to the R thumb.  Patient cut himself with a piece of carboard roughly 1 month ago. He complains that the wound has not healed.  He has a piece of skin that he wears a band aid over to keep from catching it on things.   He denies any Pain, swelling, discharge, or dysfunction of the thumb.    ROS:      Review of Systems   Constitutional: Negative.  Negative for chills and fever.   HENT: Negative.  Negative for sore throat.    Respiratory: Negative.  Negative for cough, chest tightness, shortness of breath and wheezing.    Cardiovascular: Negative.  Negative for chest pain and palpitations.   Gastrointestinal: Negative.  Negative for abdominal pain, diarrhea, nausea and vomiting.   Genitourinary: Negative for dysuria, frequency, hematuria and urgency.   Musculoskeletal: Negative.  Negative for myalgias.   Skin: Positive for wound. Negative for rash.   Allergic/Immunologic: Negative.  Negative for immunocompromised state.   Neurological: Negative.  Negative for headaches.        Family History   Family History   Problem Relation Age of Onset     Cancer - colorectal Father      Prostate Cancer Father      Heart Disease Father         heart attack     Colon Cancer Father      Breast Cancer Sister      Diabetes No family hx of      C.A.D. 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 No   Social History Narrative     Not on file     Social Determinants of Health     Financial Resource Strain: Not on file   Food Insecurity: Not on file   Transportation Needs: Not on file   Physical Activity: Not on file   Stress: Not on file   Social Connections: Not on file   Intimate Partner Violence: Not on file   Housing Stability: Not on file        Surgical History:  Past Surgical History:   Procedure Laterality Date     ACROMIOPLASTY Right 09/03/2021    Zana Stout     ANESTHESIA OUT OF OR MRI N/A 04/07/2021    Procedure: ANESTHESIA OUT OF OR Magnetic resonance imaging cervical spine @1200;  Surgeon: GENERIC ANESTHESIA PROVIDER;  Location: [facility] OR     ANESTHESIA OUT OF OR MRI Right 07/19/2021    Procedure: Magnetic Resonance Imaging of  right shoulder without contrast@1400;  Surgeon: GENERIC ANESTHESIA PROVIDER;  Location: [facility] OR     ARTHROTOMY SHOULDER, ROTATOR CUFF REPAIR, COMBINED Right 9/3/2021    Procedure: ARTHROTOMY, SHOULDER, acromioplasty, distal clavicle excision;  Surgeon: Vadim Kaiser MD;  Location: [facility] OR     COLONOSCOPY  05/2016     HERNIA REPAIR  2008 or 2009        Problem List:  Patient Active Problem List   Diagnosis     Allergic rhinitis     HYPERLIPIDEMIA LDL GOAL <160     Erectile dysfunction     Family history of colon cancer     Family history of prostate cancer     Dysthymic disorder     Epistaxis     Acute pain of right shoulder     Numbness and tingling of right arm     Adhesive capsulitis of right shoulder     Claustrophobia     Nontraumatic complete tear of right rotator cuff     Arthritis of right acromioclavicular joint     Impingement syndrome of right shoulder          "  OBJECTIVE:     Vital signs noted and reviewed by Jean-Claude Juares PA-C  BP (!) 148/104 (BP Location: Left arm, Patient Position: Sitting, Cuff Size: Adult Large)   Pulse 92   Temp 97.9  F (36.6  C) (Tympanic)   Ht 1.778 m (5' 10\")   Wt 108 kg (238 lb 3.2 oz)   SpO2 96%   BMI 34.18 kg/m       PEFR:    Physical Exam  Vitals and nursing note reviewed.   Constitutional:       General: He is not in acute distress.     Appearance: He is well-developed. He is not ill-appearing, toxic-appearing or diaphoretic.   HENT:      Head: Normocephalic and atraumatic.      Right Ear: Hearing, tympanic membrane, ear canal and external ear normal. Tympanic membrane is not perforated, erythematous, retracted or bulging.      Left Ear: Hearing, tympanic membrane, ear canal and external ear normal. Tympanic membrane is not perforated, erythematous, retracted or bulging.      Nose: Nose normal. No mucosal edema, congestion or rhinorrhea.      Mouth/Throat:      Pharynx: No oropharyngeal exudate or posterior oropharyngeal erythema.      Tonsils: No tonsillar exudate or tonsillar abscesses. 0 on the right. 0 on the left.   Eyes:      Pupils: Pupils are equal, round, and reactive to light.   Cardiovascular:      Rate and Rhythm: Normal rate and regular rhythm.      Heart sounds: Normal heart sounds, S1 normal and S2 normal. Heart sounds not distant. No murmur heard.    No friction rub. No gallop.   Pulmonary:      Effort: Pulmonary effort is normal. No respiratory distress.      Breath sounds: Normal breath sounds. No decreased breath sounds, wheezing, rhonchi or rales.   Abdominal:      General: Bowel sounds are normal. There is no distension.      Palpations: Abdomen is soft.      Tenderness: There is no abdominal tenderness.   Musculoskeletal:      Right hand: Laceration present. No swelling, deformity, tenderness or bony tenderness. Normal range of motion. Normal strength. Normal sensation. There is no disruption of two-point " discrimination. Normal capillary refill. Normal pulse.        Hands:       Cervical back: Normal range of motion and neck supple.      Comments: Small laceration to the dorsal side of the R thumb with piece of avulsed skin attached.  No erythema, swelling or discharge.   Lymphadenopathy:      Cervical: No cervical adenopathy.   Skin:     General: Skin is warm and dry.      Findings: No rash.   Neurological:      Mental Status: He is alert.      Cranial Nerves: No cranial nerve deficit.   Psychiatric:         Attention and Perception: He is attentive.         Speech: Speech normal.         Behavior: Behavior normal. Behavior is cooperative.         Thought Content: Thought content normal.         Judgment: Judgment normal.             Jean-Claude Juares PA-C  1/6/2023, 3:07 PM

## 2023-01-17 ENCOUNTER — OFFICE VISIT (OUTPATIENT)
Dept: FAMILY MEDICINE | Facility: CLINIC | Age: 53
End: 2023-01-17
Payer: COMMERCIAL

## 2023-01-17 VITALS
WEIGHT: 236.1 LBS | DIASTOLIC BLOOD PRESSURE: 96 MMHG | TEMPERATURE: 97.8 F | BODY MASS INDEX: 33.88 KG/M2 | SYSTOLIC BLOOD PRESSURE: 149 MMHG | OXYGEN SATURATION: 96 % | HEART RATE: 61 BPM

## 2023-01-17 DIAGNOSIS — L98.0 PYOGENIC GRANULOMA: Primary | ICD-10-CM

## 2023-01-17 PROCEDURE — 99214 OFFICE O/P EST MOD 30 MIN: CPT | Performed by: PHYSICIAN ASSISTANT

## 2023-01-17 NOTE — PROGRESS NOTES
..  URGENT CARE VISIT:    SUBJECTIVE:   Chief Complaint   Patient presents with     WOUND CARE     Went to urgent care 2 weeks ago for a wound on right thumb. Month ago got huge paper cut on thumb and its not healing. Does not look good and is not healing       Yvon Dunlap is a 52 year old male who presents with a chief complaint of right thumb wound that is not healing.  Symptoms began 6 week(s) ago, are moderate and sudden onset  He had a paper cut on thumb a few weeks ago.  Nothing makes is better or worse. He treated it initially with no therapy. This is the first time this type of injury has occurred to this patient.     PMH:   Past Medical History:   Diagnosis Date     Adhesive capsulitis of right shoulder 4/26/2021     ALLERGIC RHINITIS NOS 5/8/2006     Depressive disorder Sometime in the 1990s, I think     Depressive disorder, not elsewhere classified      Family history of colon cancer      Other and unspecified hyperlipidemia      Allergies: Patient has no known allergies.   Medications:   Current Outpatient Medications   Medication Sig Dispense Refill     buPROPion (WELLBUTRIN XL) 150 MG 24 hr tablet Take 1 tablet (150 mg) by mouth every morning (Take in addition to 300 mg dose) 90 tablet 3     CLARITIN 10 MG OR TABS 1 TABLET DAILY prn for allergies       Multiple Vitamin (DAILY MULTIVITAMIN PO) Take  by mouth daily.       sildenafil (VIAGRA) 100 MG tablet Take 1 tablet (100 mg) by mouth daily as needed TAKE 1 TABLET BY MOUTH DAILY AS NEEDED. TAKE 30 MINS-4 HRS PRIOR TO INTERCOURSE 12 tablet 3     triamcinolone (KENALOG) 0.1 % external cream Apply topically 2 times daily as needed for irritation Apply sparingly to affected area three times daily as needed 45 g 1     benzonatate (TESSALON) 100 MG capsule Take 1 capsule (100 mg) by mouth 3 times daily as needed for cough (Patient not taking: Reported on 1/17/2023) 30 capsule 1     propranolol (INDERAL) 20 MG tablet TAKE 1 TO 2 TABLETS, 30 MINUTES  BEFORE ANXIETY INDUCING EVENT (Patient not taking: Reported on 1/17/2023) 90 tablet 0     Social History:   Social History     Tobacco Use     Smoking status: Never     Smokeless tobacco: Never   Substance Use Topics     Alcohol use: Yes     Comment: moderate, 2 beers per day       ROS:  Review of systems negative except as stated above.    OBJECTIVE:  BP (!) 149/96   Pulse 61   Temp 97.8  F (36.6  C) (Oral)   Wt 107.1 kg (236 lb 1.6 oz)   SpO2 96%   BMI 33.88 kg/m    GENERAL APPEARANCE: healthy, alert and no distress  MUSCULOSKELETAL:moderate TTP over dorsal right thumb  EXTREMITIES: peripheral pulses normal  SKIN: 1 cm red papule on right thumb. No surrounding erythema or purulent drainage  NEURO: sensation intact         ASSESSMENT:    ICD-10-CM    1. Pyogenic granuloma  L98.0 Adult Dermatology Referral          PLAN:  30 minutes spent on the date of the encounter doing chart review, review of outside records and documentation   Patient Instructions   Patient was educated on the natural course of injury.  Keep wound dry and clean. Watch for signs of infection such as redness or purulent drainage. Conservative measures discussed including over-the-counter Tylenol as needed for pain. Referred to dermatology for excision. Seek emergency care if you develop fever, streaking, severe pain or rapidly spreading redness.     Patient verbalized understanding and is agreeable to plan. The patient was discharged ambulatory and in stable condition.    Shelby Mcgill PA-C on 1/17/2023 at 11:44 AM

## 2023-01-17 NOTE — PATIENT INSTRUCTIONS
Patient was educated on the natural course of injury.  Keep wound dry and clean. Watch for signs of infection such as redness or purulent drainage. Conservative measures discussed including over-the-counter Tylenol as needed for pain. Referred to dermatology for excision. Seek emergency care if you develop fever, streaking, severe pain or rapidly spreading redness.

## 2023-01-18 ENCOUNTER — OFFICE VISIT (OUTPATIENT)
Dept: DERMATOLOGY | Facility: CLINIC | Age: 53
End: 2023-01-18
Payer: COMMERCIAL

## 2023-01-18 DIAGNOSIS — L98.0 PYOGENIC GRANULOMA: ICD-10-CM

## 2023-01-18 PROCEDURE — 11421 EXC H-F-NK-SP B9+MARG 0.6-1: CPT | Performed by: DERMATOLOGY

## 2023-01-18 PROCEDURE — 99242 OFF/OP CONSLTJ NEW/EST SF 20: CPT | Mod: 25 | Performed by: DERMATOLOGY

## 2023-01-18 PROCEDURE — 88331 PATH CONSLTJ SURG 1 BLK 1SPC: CPT | Performed by: DERMATOLOGY

## 2023-01-18 ASSESSMENT — PAIN SCALES - GENERAL: PAINLEVEL: NO PAIN (0)

## 2023-01-18 NOTE — LETTER
1/18/2023         RE: Yvon Dunlap  [address] [address]        Dear Colleague,    Thank you for referring your patient, Yvon Dunlap, to the [facility]. Please see a copy of my visit note below.    Yvon Dunlap , a 52 year old year old male patient, I was asked to see by MADIHA Mcgill for lesion on right thumb.  Patient states this has been present for 6 weeks.  Patient reports the following symptoms:  Not healing .  Patient reports the following previous treatments none.  Patient reports the following modifying factors had an injury there.  .  Associated symptoms: none.  Patient has no other skin complaints today.  Remainder of the HPI, Meds, PMH, Allergies, FH, and SH was reviewed in chart.      Past Medical History:   Diagnosis Date     Adhesive capsulitis of right shoulder 04/26/2021     ALLERGIC RHINITIS NOS 05/08/2006     Depressive disorder Sometime in the 1990s, I think     Depressive disorder, not elsewhere classified      Family history of colon cancer      Other and unspecified hyperlipidemia        Past Surgical History:   Procedure Laterality Date     ACROMIOPLASTY Right 09/03/2021    DML.  Zana Watkins     ANESTHESIA OUT OF OR MRI N/A 04/07/2021    Procedure: ANESTHESIA OUT OF OR Magnetic resonance imaging cervical spine @1200;  Surgeon: GENERIC ANESTHESIA PROVIDER;  Location: [facility] OR     ANESTHESIA OUT OF OR MRI Right 07/19/2021    Procedure: Magnetic Resonance Imaging of  right shoulder without contrast@1400;  Surgeon: GENERIC ANESTHESIA PROVIDER;  Location: [facility] OR     ARTHROTOMY SHOULDER, ROTATOR CUFF REPAIR, COMBINED Right 9/3/2021    Procedure: ARTHROTOMY, SHOULDER, acromioplasty, distal clavicle excision;  Surgeon: Vadim Kaiser MD;  Location:  OR     COLONOSCOPY  05/2016     HERNIA REPAIR  2008 or 2009        Family History   Problem Relation Age of Onset     Cancer - colorectal Father      Prostate Cancer Father      Heart  Disease Father         heart attack     Colon Cancer Father      Breast Cancer Sister      Diabetes No family hx of      C.A.D. 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 No   Social History Narrative     Not on file     Social Determinants of Health     Financial Resource Strain: Not on file   Food Insecurity: Not on file   Transportation Needs: Not on file   Physical Activity: Not on file   Stress: Not on file   Social Connections: Not on file   Intimate Partner Violence: Not on file   Housing Stability: Not on file       Outpatient Encounter Medications as of 1/18/2023   Medication Sig Dispense Refill     buPROPion (WELLBUTRIN XL) 150 MG 24 hr tablet Take 1 tablet (150 mg) by mouth every morning (Take in addition to 300 mg dose) 90 tablet 3     CLARITIN 10 MG OR TABS 1 TABLET DAILY prn for allergies       Multiple Vitamin (DAILY MULTIVITAMIN PO) Take  by mouth daily.       sildenafil (VIAGRA) 100 MG tablet Take 1 tablet (100 mg) by mouth daily as needed TAKE 1 TABLET BY MOUTH DAILY AS NEEDED. TAKE 30 MINS-4 HRS PRIOR TO INTERCOURSE 12 tablet 3     triamcinolone (KENALOG) 0.1 % external cream Apply topically 2 times daily as needed for irritation Apply sparingly to affected area three times daily as needed 45  g 1     benzonatate (TESSALON) 100 MG capsule Take 1 capsule (100 mg) by mouth 3 times daily as needed for cough (Patient not taking: Reported on 1/17/2023) 30 capsule 1     propranolol (INDERAL) 20 MG tablet TAKE 1 TO 2 TABLETS, 30 MINUTES BEFORE ANXIETY INDUCING EVENT (Patient not taking: Reported on 1/17/2023) 90 tablet 0     No facility-administered encounter medications on file as of 1/18/2023.             Review Of Systems  Skin: As above  Eyes: negative  Ears/Nose/Throat: negative  Respiratory: No shortness of breath, dyspnea on exertion, cough, or hemoptysis  Cardiovascular: negative  Gastrointestinal: negative  Genitourinary: negative  Musculoskeletal: negative  Neurologic: negative  Psychiatric: negative  Hematologic/Lymphatic/Immunologic: negative  Endocrine: negative      O:   NAD, WDWN, Alert & Oriented, Mood & Affect wnl, Vitals stable   Here today alone   General appearance sophie ii   Vitals stable   Alert, oriented and in no acute distress    R thumb 7mm bleeding lesion with collarette       Eyes: Conjunctivae/lids:Normal     ENT: Lips, buccal mucosa, tongue: normal    MSK:Normal    Cardiovascular: peripheral edema none    Pulm: Breathing Normal    Neuro/Psych: Orientation:Normal; Mood/Affect:Normal      MICRO:   R thumb:Ulcerated epidermis with elongated rete ridges forming an epidermal collarette at the edge of a dermal lobulated proliferation of capillary sized vessels.  No atypia or mitotic figures.    A/P:  1. R thumb pg  Excision discussed with patient   He would like removed  EXCISION OF BENIGN LESION : After thorough discussion of PGACAC, consent obtained, anesthesia and prep, the margins of the lesion were identified and an incision was made encompassing the lesion. The incisions were made through the skin and down to and including the subcutaneous tissue. The lesion was removed en bloc and submitted for frozen  pathologic review.   REPAIR SECOND INTENT: We discussed the options for wound  management in full with the patient including risks/benefits/possible outcomes. Decision made to allow the wound to heal by second intention. Cautery was used for for hemostasis. EBL minimal; complications none; wound care routine.  The patient was discharged in good condition and will return in one month or prn for wound evaluation.   It was a pleasure speaking to Yvon Dunlap today.  Previous clinic  notes and pertinent laboratory tests were reviewed prior to Yvon Dunlap's visit.        Again, thank you for allowing me to participate in the care of your patient.        Sincerely,        Km Francois MD

## 2023-01-18 NOTE — PATIENT INSTRUCTIONS
Open Wound Care         No strenuous activity for 48 hours    Take Tylenol as needed for discomfort.                                                .         Do not drink alcoholic beverages for 48 hours.    Keep the pressure bandage in place for 24 hours. If the bandage becomes blood tinged or loose, reinforce it with gauze and tape.        (Refer to the reverse side of this page for management of bleeding).    Remove bandage in 24 hours and begin wound care as follows:     Clean area with tap water using a Q tip or gauze pad, (shower / bathe normally)  Dry wound with Q tip or gauze pad  Apply Aquaphor, Vaseline, Polysporin or Bacitracin Ointment with a Q tip  Do NOT use Neosporin Ointment *  Cover the wound with a band-aid or nonstick gauze pad and paper tape.  Repeat wound care once a day until wound is completely healed.    It is an old wives tale that a wound heals better when it is exposed to air and allowed to dry out. The wound will heal faster with a better cosmetic result if it is kept moist with ointment and covered with a bandage.  Do not let the wound dry out.      Supplies Needed:                Qtips or gauze pads                Polysporin or Bacitracin Ointment                Bandaids or nonstick gauze pads and paper tape    Wound care kits and brown paper tape are available for purchase at   the pharmacy.    BLEEDING:    Use tightly rolled up gauze or cloth to apply direct pressure over the bandage for 20   minutes.  Reapply pressure for an additional 20 minutes if necessary  Call the office or go to the nearest emergency room if pressure fails to stop the bleeding.  Use additional gauze and tape to maintain pressure once the bleeding has stopped.  Begin wound care 24 hours after surgery as directed.                  WOUND HEALING    One week after surgery a pink / red halo will form around the outside of the wound.   This is new skin.  The center of the wound will appear yellowish white and produce  some drainage.  The pink halo will slowly migrate in toward the center of the wound until the wound is covered with new shiny pink skin.  There will be no more drainage when the wound is completely healed.  It will take six months to one year for the redness to fade.  The scar may be itchy, tight and sensitive to extreme temperatures for a year after the surgery.  Massaging the area several times a day for several minutes after the wound is completely healed will help the scar soften and normalize faster. Begin massage only after healing is complete.      In case of emergency call: Dr Francois: [phone]    [facility]: [phone]    [facility]:[phone]

## 2023-01-18 NOTE — PROGRESS NOTES
Yvon Dunlap , a 52 year old year old male patient, I was asked to see by MADIHA Mcgill for lesion on right thumb.  Patient states this has been present for 6 weeks.  Patient reports the following symptoms:  Not healing .  Patient reports the following previous treatments none.  Patient reports the following modifying factors had an injury there.  .  Associated symptoms: none.  Patient has no other skin complaints today.  Remainder of the HPI, Meds, PMH, Allergies, FH, and SH was reviewed in chart.      Past Medical History:   Diagnosis Date     Adhesive capsulitis of right shoulder 04/26/2021     ALLERGIC RHINITIS NOS 05/08/2006     Depressive disorder Sometime in the 1990s, I think     Depressive disorder, not elsewhere classified      Family history of colon cancer      Other and unspecified hyperlipidemia        Past Surgical History:   Procedure Laterality Date     ACROMIOPLASTY Right 09/03/2021    Zana Stout     ANESTHESIA OUT OF OR MRI N/A 04/07/2021    Procedure: ANESTHESIA OUT OF OR Magnetic resonance imaging cervical spine @1200;  Surgeon: GENERIC ANESTHESIA PROVIDER;  Location: [facility] OR     ANESTHESIA OUT OF OR MRI Right 07/19/2021    Procedure: Magnetic Resonance Imaging of  right shoulder without contrast@1400;  Surgeon: GENERIC ANESTHESIA PROVIDER;  Location: [facility] OR     ARTHROTOMY SHOULDER, ROTATOR CUFF REPAIR, COMBINED Right 9/3/2021    Procedure: ARTHROTOMY, SHOULDER, acromioplasty, distal clavicle excision;  Surgeon: Vadim Kaiser MD;  Location: [facility] OR     COLONOSCOPY  05/2016     HERNIA REPAIR  2008 or 2009        Family History   Problem Relation Age of Onset     Cancer - colorectal Father      Prostate Cancer Father      Heart Disease Father         heart attack     Colon Cancer Father      Breast Cancer Sister      Diabetes No family hx of      C.A.D. 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 No   Social History Narrative     Not on file     Social Determinants of Health     Financial Resource Strain: Not on file   Food Insecurity: Not on file   Transportation Needs: Not on file   Physical Activity: Not on file   Stress: Not on file   Social Connections: Not on file   Intimate Partner Violence: Not on file   Housing Stability: Not on file       Outpatient Encounter Medications as of 1/18/2023   Medication Sig Dispense Refill     buPROPion (WELLBUTRIN XL) 150 MG 24 hr tablet Take 1 tablet (150 mg) by mouth every morning (Take in addition to 300 mg dose) 90 tablet 3     CLARITIN 10 MG OR TABS 1 TABLET DAILY prn for allergies       Multiple Vitamin (DAILY MULTIVITAMIN PO) Take  by mouth daily.       sildenafil (VIAGRA) 100 MG tablet Take 1 tablet (100 mg) by mouth daily as needed TAKE 1 TABLET BY MOUTH DAILY AS NEEDED. TAKE 30 MINS-4 HRS PRIOR TO INTERCOURSE 12 tablet 3     triamcinolone (KENALOG) 0.1 % external cream Apply topically 2 times daily as needed for irritation Apply sparingly to affected area three times daily as needed 45 g 1     benzonatate (TESSALON) 100 MG capsule Take 1 capsule (100 mg) by mouth 3 times daily as needed for cough (Patient not taking: Reported on 1/17/2023) 30 capsule 1     propranolol (INDERAL) 20 MG tablet TAKE 1 TO 2 TABLETS, 30 MINUTES  BEFORE ANXIETY INDUCING EVENT (Patient not taking: Reported on 1/17/2023) 90 tablet 0     No facility-administered encounter medications on file as of 1/18/2023.             Review Of Systems  Skin: As above  Eyes: negative  Ears/Nose/Throat: negative  Respiratory: No shortness of breath, dyspnea on exertion, cough, or hemoptysis  Cardiovascular: negative  Gastrointestinal: negative  Genitourinary: negative  Musculoskeletal: negative  Neurologic: negative  Psychiatric: negative  Hematologic/Lymphatic/Immunologic: negative  Endocrine: negative      O:   NAD, WDWN, Alert & Oriented, Mood & Affect wnl, Vitals stable   Here today alone   General appearance sophie ii   Vitals stable   Alert, oriented and in no acute distress    R thumb 7mm bleeding lesion with collarette       Eyes: Conjunctivae/lids:Normal     ENT: Lips, buccal mucosa, tongue: normal    MSK:Normal    Cardiovascular: peripheral edema none    Pulm: Breathing Normal    Neuro/Psych: Orientation:Normal; Mood/Affect:Normal      MICRO:   R thumb:Ulcerated epidermis with elongated rete ridges forming an epidermal collarette at the edge of a dermal lobulated proliferation of capillary sized vessels.  No atypia or mitotic figures.    A/P:  1. R thumb pg  Excision discussed with patient   He would like removed  EXCISION OF BENIGN LESION : After thorough discussion of PGACAC, consent obtained, anesthesia and prep, the margins of the lesion were identified and an incision was made encompassing the lesion. The incisions were made through the skin and down to and including the subcutaneous tissue. The lesion was removed en bloc and submitted for frozen  pathologic review.   REPAIR SECOND INTENT: We discussed the options for wound management in full with the patient including risks/benefits/possible outcomes. Decision made to allow the wound to heal by second intention. Cautery was used for for hemostasis. EBL minimal; complications none; wound care routine.  The patient  was discharged in good condition and will return in one month or prn for wound evaluation.   It was a pleasure speaking to Yvon Dunlap today.  Previous clinic  notes and pertinent laboratory tests were reviewed prior to Yvon Dunlap's visit.

## 2023-03-26 ENCOUNTER — HEALTH MAINTENANCE LETTER (OUTPATIENT)
Age: 53
End: 2023-03-26

## 2023-06-22 ENCOUNTER — TRANSFERRED RECORDS (OUTPATIENT)
Dept: HEALTH INFORMATION MANAGEMENT | Facility: CLINIC | Age: 53
End: 2023-06-22
Payer: COMMERCIAL

## 2024-01-11 ENCOUNTER — TELEPHONE (OUTPATIENT)
Dept: FAMILY MEDICINE | Facility: CLINIC | Age: 54
End: 2024-01-11
Payer: COMMERCIAL

## 2024-01-11 DIAGNOSIS — U07.1 INFECTION DUE TO 2019 NOVEL CORONAVIRUS: Primary | ICD-10-CM

## 2024-01-11 NOTE — TELEPHONE ENCOUNTER
RN COVID TREATMENT VISIT  01/11/24      The patient has been triaged and does not require a higher level of care.    Yvon Dunlap  53 year old  Current weight? 236    Has the patient been seen by a primary care provider at an [facility] or [facility] within the past two years? Yes.   Have you been in close proximity to/do you have a known exposure to a person with a confirmed case of influenza? No.     General treatment eligibility:  Date of positive COVID test (PCR or at home)?  1/9/24 home test     Are you or have you been hospitalized for this COVID-19 infection? No.   Have you received monoclonal antibodies or antiviral treatment for COVID-19 since this positive test? No.   Do you have any of the following conditions that place you at risk of being very sick from COVID-19?   - Age 50 years or older  - Mental health disorders including mood disorders, depression, schizophrenia spectrum disorders   - Current or former smoker  Yes, patient has at least one high risk condition as noted above.     Current COVID symptoms:   - fever or chills  - cough  - fatigue  Yes. Patient has at least one symptom as selected.     How many days since symptoms started? 5 days or less. Established patient, 12 years or older weighing at least 88.2 lbs, who has symptoms that started in the past 5 days, has not been hospitalized nor received treatment already, and is at risk for being very sick from COVID-19.     Treatment eligibility by RN:  Are you currently pregnant or nursing? No  Do you have a clinically significant hypersensitivity to nirmatrelvir or ritonavir, or toxic epidermal necrolysis (TEN) or Carballo-Junior Syndrome? No  Do you have a history of hepatitis, any hepatic impairment on the Problem List (such as Child-Vaz Class C, cirrhosis, fatty liver disease, alcoholic liver disease), or was the last liver lab (hepatic panel, ALT, AST, ALK Phos, bilirubin) elevated in the past 6 months?  No  Do you have any history of severe renal impairment (eGFR < 30mL/min)? No    Is patient eligible to continue? Yes, patient meets all eligibility requirements for the RN COVID treatment (as denoted by all no responses above).     Current Outpatient Medications   Medication Sig Dispense Refill    benzonatate (TESSALON) 100 MG capsule Take 1 capsule (100 mg) by mouth 3 times daily as needed for cough (Patient not taking: Reported on 1/17/2023) 30 capsule 1    buPROPion (WELLBUTRIN XL) 150 MG 24 hr tablet Take 1 tablet (150 mg) by mouth every morning (Take in addition to 300 mg dose) 90 tablet 3    CLARITIN 10 MG OR TABS 1 TABLET DAILY prn for allergies      Multiple Vitamin (DAILY MULTIVITAMIN PO) Take  by mouth daily.      propranolol (INDERAL) 20 MG tablet TAKE 1 TO 2 TABLETS, 30 MINUTES BEFORE ANXIETY INDUCING EVENT (Patient not taking: Reported on 1/17/2023) 90 tablet 0    sildenafil (VIAGRA) 100 MG tablet Take 1 tablet (100 mg) by mouth daily as needed TAKE 1 TABLET BY MOUTH DAILY AS NEEDED. TAKE 30 MINS-4 HRS PRIOR TO INTERCOURSE 12 tablet 3    triamcinolone (KENALOG) 0.1 % external cream Apply topically 2 times daily as needed for irritation Apply sparingly to affected area three times daily as needed 45 g 1       Medications from List 1 of the standing order (on medications that exclude the use of Paxlovid) that patient is taking: NONE. Is patient taking Addie's Wort? No  Is patient taking Addie's Wort or any meds from List 1? No.   Medications from List 2 of the standing order (on meds that provider needs to adjust) that patient is taking: NONE. Is patient on any of the meds from List 2? No.   Medications from List 3 of standing order (on meds that a RN needs to adjust) that patient is taking: sildenafil (Viagra, Revatio): Is patient using for erectile dysfunction? Yes, instructed patient to stop taking sildenafil while taking Paxlovid and restart sildenafil 3 days after the completion of Paxlovid. Is  patient on any meds from List 3? Yes. Patient is on meds from list 3. No meds require a provider visit and at least one med required RN to adjust.     Paxlovid has an approximate 90% reduction in hospitalization. Paxlovid can possibly cause altered sense of taste, diarrhea (loose, watery stools), high blood pressure, muscle aches.     Would patient like a Paxlovid prescription?   Yes.   Lab Results   Component Value Date    GFRESTIMATED >90 07/11/2020       Was last eGFR reduced? No, eGFR 60 or greater/ No Result on record. Patient can receive the normal renal function dose. Paxlovid Rx sent to [facility]   [facility] [phone]  [address], [address]  [place] [address]    Hours:  Mon-Fri: 7:00a - 7:00p    Drive-thru services available.    Temporary change to home medications: Viagra hold for course of tx with paxlovid, restart PRN 3 days after completion of medication     All medication adjustments (holds, etc) were discussed with the patient and patient was asked to repeat back (teachback) their med adjustment.  Did patient understand med adjustment? Yes, patient repeated back and understood correctly.        Reviewed the following instructions with the patient:    Paxlovid (nimatrelvir and ritonavir)    How it works  Two medicines (nirmatrelvir and ritonavir) are taken together. They stop the virus from growing. Less amount of virus is easier for your body to fight.    How to take  Medicine comes in a daily container with both medicine tablets. Take by mouth twice daily (once in the morning, once at night) for 5 days.  The number of tablets to take varies by patient.  Don't chew or break capsules. Swallow whole.    When to take  Take as soon as possible after positive COVID-19 test result, and within 5 days of your first symptoms.    Possible side effects  Can cause altered sense of taste, diarrhea (loose, watery stools), high blood pressure, muscle aches.    Melissa De Luna RN

## 2024-03-16 ENCOUNTER — OFFICE VISIT (OUTPATIENT)
Dept: URGENT CARE | Facility: URGENT CARE | Age: 54
End: 2024-03-16
Payer: COMMERCIAL

## 2024-03-16 VITALS
DIASTOLIC BLOOD PRESSURE: 105 MMHG | HEIGHT: 70 IN | SYSTOLIC BLOOD PRESSURE: 167 MMHG | HEART RATE: 80 BPM | OXYGEN SATURATION: 98 % | BODY MASS INDEX: 32.45 KG/M2 | WEIGHT: 226.7 LBS | TEMPERATURE: 97.9 F | RESPIRATION RATE: 17 BRPM

## 2024-03-16 DIAGNOSIS — J20.9 ACUTE BRONCHITIS, UNSPECIFIED ORGANISM: Primary | ICD-10-CM

## 2024-03-16 PROCEDURE — 99213 OFFICE O/P EST LOW 20 MIN: CPT | Performed by: PHYSICIAN ASSISTANT

## 2024-03-16 RX ORDER — PREDNISONE 20 MG/1
20 TABLET ORAL DAILY
Qty: 5 TABLET | Refills: 0 | Status: SHIPPED | OUTPATIENT
Start: 2024-03-16 | End: 2024-03-21

## 2024-03-16 RX ORDER — AZITHROMYCIN 250 MG/1
TABLET, FILM COATED ORAL
Qty: 6 TABLET | Refills: 0 | Status: SHIPPED | OUTPATIENT
Start: 2024-03-16 | End: 2024-03-21

## 2024-03-16 RX ORDER — ALBUTEROL SULFATE 90 UG/1
2 AEROSOL, METERED RESPIRATORY (INHALATION) EVERY 6 HOURS PRN
Qty: 18 G | Refills: 0 | Status: SHIPPED | OUTPATIENT
Start: 2024-03-16 | End: 2024-04-08

## 2024-03-16 RX ORDER — BENZONATATE 200 MG/1
200 CAPSULE ORAL 3 TIMES DAILY PRN
Qty: 30 CAPSULE | Refills: 0 | Status: SHIPPED | OUTPATIENT
Start: 2024-03-16

## 2024-04-02 NOTE — PROGRESS NOTES
SUBJECTIVE:   Yvon Dunlap is a 53 year old male presenting with a chief complaint of runny nose, stuffy nose, cough - non-productive, and sore throat.  Onset of symptoms was 3 day(s) ago.  Course of illness is same.    Severity moderate  Current and Associated symptoms: as above  Treatment measures tried include Fluids and Rest.  Predisposing factors include None.    Past Medical History:   Diagnosis Date    Adhesive capsulitis of right shoulder 04/26/2021    ALLERGIC RHINITIS NOS 05/08/2006    Depressive disorder Sometime in the 1990s, I think    Depressive disorder, not elsewhere classified     Family history of colon cancer     Other and unspecified hyperlipidemia      Current Outpatient Medications   Medication Sig Dispense Refill    albuterol (PROAIR HFA/PROVENTIL HFA/VENTOLIN HFA) 108 (90 Base) MCG/ACT inhaler Inhale 2 puffs into the lungs every 6 hours as needed 18 g 0    benzonatate (TESSALON) 200 MG capsule Take 1 capsule (200 mg) by mouth 3 times daily as needed 30 capsule 0    CLARITIN 10 MG OR TABS 1 TABLET DAILY prn for allergies      Multiple Vitamin (DAILY MULTIVITAMIN PO) Take  by mouth daily.      benzonatate (TESSALON) 100 MG capsule Take 1 capsule (100 mg) by mouth 3 times daily as needed for cough (Patient not taking: Reported on 1/17/2023) 30 capsule 1    buPROPion (WELLBUTRIN XL) 150 MG 24 hr tablet Take 1 tablet (150 mg) by mouth every morning (Take in addition to 300 mg dose) (Patient not taking: Reported on 3/16/2024) 90 tablet 3    propranolol (INDERAL) 20 MG tablet TAKE 1 TO 2 TABLETS, 30 MINUTES BEFORE ANXIETY INDUCING EVENT (Patient not taking: Reported on 1/17/2023) 90 tablet 0    sildenafil (VIAGRA) 100 MG tablet Take 1 tablet (100 mg) by mouth daily as needed TAKE 1 TABLET BY MOUTH DAILY AS NEEDED. TAKE 30 MINS-4 HRS PRIOR TO INTERCOURSE 12 tablet 3    triamcinolone (KENALOG) 0.1 % external cream Apply topically 2 times daily as needed for irritation Apply sparingly to affected  "area three times daily as needed (Patient not taking: Reported on 3/16/2024) 45 g 1     Social History     Tobacco Use    Smoking status: Never    Smokeless tobacco: Never   Substance Use Topics    Alcohol use: Yes     Comment: moderate, 2 beers per day       ROS:  Review of systems negative except as stated above.    OBJECTIVE:  BP (!) 167/105   Pulse 80   Temp 97.9  F (36.6  C) (Temporal)   Resp 17   Ht 1.778 m (5' 10\")   Wt 102.8 kg (226 lb 11.2 oz)   SpO2 98%   BMI 32.53 kg/m    GENERAL APPEARANCE: healthy, alert and no distress  HENT: ear canals and TM's normal.  Nose and mouth without ulcers, erythema or lesions  NECK: supple, nontender, no lymphadenopathy  RESP: expiratory wheezes throughout.  lungs otherwise clear to auscultation - no rales, rhonchi   CV: regular rates and rhythm, normal S1 S2, no murmur noted  NEURO: Normal strength and tone, sensory exam grossly normal,  normal speech and mentation  SKIN: no suspicious lesions or rashes      No results found for any visits on 03/16/24.    ASSESSMENT:  (J20.9) Acute bronchitis, unspecified organism  (primary encounter diagnosis)  Plan: benzonatate (TESSALON) 200 MG capsule,         albuterol (PROAIR HFA/PROVENTIL HFA/VENTOLIN         HFA) 108 (90 Base) MCG/ACT inhaler, predniSONE         (DELTASONE) 20 MG tablet, azithromycin         (ZITHROMAX) 250 MG tablet        "

## 2024-04-07 DIAGNOSIS — J20.9 ACUTE BRONCHITIS, UNSPECIFIED ORGANISM: ICD-10-CM

## 2024-04-08 RX ORDER — ALBUTEROL SULFATE 90 UG/1
2 AEROSOL, METERED RESPIRATORY (INHALATION) EVERY 6 HOURS PRN
Qty: 18 G | Refills: 0 | Status: SHIPPED | OUTPATIENT
Start: 2024-04-08

## 2024-05-26 ENCOUNTER — HEALTH MAINTENANCE LETTER (OUTPATIENT)
Age: 54
End: 2024-05-26

## 2025-03-11 ENCOUNTER — ANCILLARY PROCEDURE (OUTPATIENT)
Dept: GENERAL RADIOLOGY | Facility: CLINIC | Age: 55
End: 2025-03-11
Attending: FAMILY MEDICINE
Payer: COMMERCIAL

## 2025-03-11 ENCOUNTER — OFFICE VISIT (OUTPATIENT)
Dept: FAMILY MEDICINE | Facility: CLINIC | Age: 55
End: 2025-03-11
Payer: COMMERCIAL

## 2025-03-11 VITALS
RESPIRATION RATE: 16 BRPM | BODY MASS INDEX: 33.64 KG/M2 | SYSTOLIC BLOOD PRESSURE: 148 MMHG | WEIGHT: 235 LBS | DIASTOLIC BLOOD PRESSURE: 88 MMHG | HEIGHT: 70 IN | TEMPERATURE: 97.1 F | OXYGEN SATURATION: 95 % | HEART RATE: 83 BPM

## 2025-03-11 DIAGNOSIS — M25.511 PAIN IN JOINT OF RIGHT SHOULDER: ICD-10-CM

## 2025-03-11 DIAGNOSIS — M25.511 PAIN IN JOINT OF RIGHT SHOULDER: Primary | ICD-10-CM

## 2025-03-11 PROCEDURE — 20610 DRAIN/INJ JOINT/BURSA W/O US: CPT | Performed by: FAMILY MEDICINE

## 2025-03-11 PROCEDURE — 99214 OFFICE O/P EST MOD 30 MIN: CPT | Mod: 25 | Performed by: FAMILY MEDICINE

## 2025-03-11 PROCEDURE — 73030 X-RAY EXAM OF SHOULDER: CPT | Mod: TC | Performed by: STUDENT IN AN ORGANIZED HEALTH CARE EDUCATION/TRAINING PROGRAM

## 2025-03-11 RX ORDER — CYCLOBENZAPRINE HCL 10 MG
10 TABLET ORAL
Qty: 30 TABLET | Refills: 0 | Status: SHIPPED | OUTPATIENT
Start: 2025-03-11

## 2025-03-11 RX ORDER — TRIAMCINOLONE ACETONIDE 40 MG/ML
40 INJECTION, SUSPENSION INTRA-ARTICULAR; INTRAMUSCULAR ONCE
Status: COMPLETED | OUTPATIENT
Start: 2025-03-11 | End: 2025-03-11

## 2025-03-11 RX ADMIN — TRIAMCINOLONE ACETONIDE 40 MG: 40 INJECTION, SUSPENSION INTRA-ARTICULAR; INTRAMUSCULAR at 11:29

## 2025-03-11 ASSESSMENT — PATIENT HEALTH QUESTIONNAIRE - PHQ9
10. IF YOU CHECKED OFF ANY PROBLEMS, HOW DIFFICULT HAVE THESE PROBLEMS MADE IT FOR YOU TO DO YOUR WORK, TAKE CARE OF THINGS AT HOME, OR GET ALONG WITH OTHER PEOPLE: SOMEWHAT DIFFICULT
SUM OF ALL RESPONSES TO PHQ QUESTIONS 1-9: 6
SUM OF ALL RESPONSES TO PHQ QUESTIONS 1-9: 6

## 2025-03-11 NOTE — PROGRESS NOTES
"  Assessment & Plan   Problem List Items Addressed This Visit    None  Visit Diagnoses       Pain in joint of right shoulder    -  Primary    Relevant Medications    triamcinolone (KENALOG-40) injection 40 mg (Completed)    cyclobenzaprine (FLEXERIL) 10 MG tablet    Other Relevant Orders    Large Joint/Bursa injection and/or drainage (Shoulder, Knee) (Completed)    XR Shoulder Right 2 Views    Orthopedic  Referral           We discussed his extensive shoulder surgery history. Flexeril at night. Appreciate ortho help    PROCEDURE:  JOINT ASPIRATION/INJECTION.         After a discussion of risks, benefits and side effects of procedure, informed patient consent was obtained.       The right shoulder was prepped and draped in the usual clean fashion (sterile not required for this procedure).      INJECTION:  Using 2 cc of 1% lidocaine mixed                           with 40 mg of kenalog, the posterior shoulder was successfully injected                           without complication.  Patient did experience some pain                          relief following injection.       BMI  Estimated body mass index is 33.72 kg/m  as calculated from the following:    Height as of this encounter: 1.778 m (5' 10\").    Weight as of this encounter: 106.6 kg (235 lb).           Imani Sanz is a 54 year old, presenting for the following health issues:  Musculoskeletal Problem (Right Shoulder pain that spreads down the arm into the hand causing tingling. Also spreads up into the neck. Previous Shoulder surgery )        3/11/2025     7:48 AM   Additional Questions   Roomed by Jessica PHILLIPS CMA     History of Present Illness       Reason for visit:  Shoulder pain  Symptom onset:  1-2 weeks ago   He is taking medications regularly.      Started some home neck exercises 3 days ago which seemed to help somewhat.    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          Objective    BP (!) 148/88 (BP Location: Right arm, Patient Position: Chair, Cuff Size: Adult Large)   Pulse 83   Temp 97.1  F (36.2  C) (Oral)   Resp 16   Ht 1.778 m (5' 10\")   Wt 106.6 kg (235 lb)   SpO2 95%   BMI 33.72 kg/m    Body mass index is 33.72 kg/m .  Physical Exam  Constitutional:       Appearance: Normal appearance.   Musculoskeletal:      Right shoulder: Tenderness (speeds/emptycan) present. No swelling, deformity or bony tenderness. Normal range of motion.      Left shoulder: Normal. No swelling or deformity. Normal range of motion.      Comments: No weakness in rotator cuff muscles bilaterally   Neurological:      Mental Status: He is alert.          Signed Electronically by: LITZY DANIELS DO    "

## 2025-03-12 ENCOUNTER — PATIENT OUTREACH (OUTPATIENT)
Dept: CARE COORDINATION | Facility: CLINIC | Age: 55
End: 2025-03-12
Payer: COMMERCIAL

## 2025-06-02 ENCOUNTER — TELEPHONE (OUTPATIENT)
Dept: FAMILY MEDICINE | Facility: CLINIC | Age: 55
End: 2025-06-02
Payer: COMMERCIAL

## 2025-06-02 NOTE — TELEPHONE ENCOUNTER
Patient Quality Outreach    Patient is due for the following:   Hypertension -  Hypertension follow-up visit    Action(s) Taken:   Patient has upcoming appointment, these items will be addressed at that time.    Type of outreach:    Appointment made while patient was in clinic.    Questions for provider review:    None         Jessica Day CMA  Chart routed to Care Team.

## 2025-06-12 ENCOUNTER — OFFICE VISIT (OUTPATIENT)
Dept: FAMILY MEDICINE | Facility: CLINIC | Age: 55
End: 2025-06-12
Payer: COMMERCIAL

## 2025-06-12 VITALS
RESPIRATION RATE: 16 BRPM | WEIGHT: 229 LBS | HEART RATE: 88 BPM | BODY MASS INDEX: 32.78 KG/M2 | HEIGHT: 70 IN | SYSTOLIC BLOOD PRESSURE: 115 MMHG | DIASTOLIC BLOOD PRESSURE: 80 MMHG | OXYGEN SATURATION: 98 % | TEMPERATURE: 98 F

## 2025-06-12 DIAGNOSIS — E66.811 CLASS 1 OBESITY DUE TO EXCESS CALORIES WITH SERIOUS COMORBIDITY AND BODY MASS INDEX (BMI) OF 32.0 TO 32.9 IN ADULT: ICD-10-CM

## 2025-06-12 DIAGNOSIS — E66.09 CLASS 1 OBESITY DUE TO EXCESS CALORIES WITH SERIOUS COMORBIDITY AND BODY MASS INDEX (BMI) OF 32.0 TO 32.9 IN ADULT: ICD-10-CM

## 2025-06-12 DIAGNOSIS — I10 BENIGN ESSENTIAL HYPERTENSION: Primary | ICD-10-CM

## 2025-06-12 DIAGNOSIS — R73.09 ELEVATED GLUCOSE: ICD-10-CM

## 2025-06-12 LAB
ANION GAP SERPL CALCULATED.3IONS-SCNC: 13 MMOL/L (ref 7–15)
BUN SERPL-MCNC: 12.8 MG/DL (ref 6–20)
CALCIUM SERPL-MCNC: 9.5 MG/DL (ref 8.8–10.4)
CHLORIDE SERPL-SCNC: 102 MMOL/L (ref 98–107)
CREAT SERPL-MCNC: 1.13 MG/DL (ref 0.67–1.17)
EGFRCR SERPLBLD CKD-EPI 2021: 77 ML/MIN/1.73M2
GLUCOSE SERPL-MCNC: 172 MG/DL (ref 70–99)
HCO3 SERPL-SCNC: 24 MMOL/L (ref 22–29)
POTASSIUM SERPL-SCNC: 3.7 MMOL/L (ref 3.4–5.3)
SODIUM SERPL-SCNC: 139 MMOL/L (ref 135–145)

## 2025-06-12 RX ORDER — HYDROCHLOROTHIAZIDE 25 MG/1
25 TABLET ORAL DAILY
Qty: 90 TABLET | Refills: 3 | Status: SHIPPED | OUTPATIENT
Start: 2025-06-12

## 2025-06-12 RX ORDER — METFORMIN HYDROCHLORIDE 500 MG/1
500 TABLET, EXTENDED RELEASE ORAL
Qty: 30 TABLET | Refills: 1 | Status: SHIPPED | OUTPATIENT
Start: 2025-06-12

## 2025-06-12 ASSESSMENT — PATIENT HEALTH QUESTIONNAIRE - PHQ9
SUM OF ALL RESPONSES TO PHQ QUESTIONS 1-9: 1
SUM OF ALL RESPONSES TO PHQ QUESTIONS 1-9: 1
10. IF YOU CHECKED OFF ANY PROBLEMS, HOW DIFFICULT HAVE THESE PROBLEMS MADE IT FOR YOU TO DO YOUR WORK, TAKE CARE OF THINGS AT HOME, OR GET ALONG WITH OTHER PEOPLE: SOMEWHAT DIFFICULT

## 2025-06-12 NOTE — PROGRESS NOTES
"  Assessment & Plan   Problem List Items Addressed This Visit    None  Visit Diagnoses         Benign essential hypertension    -  Primary    Relevant Medications    hydrochlorothiazide (HYDRODIURIL) 25 MG tablet    Other Relevant Orders    Basic metabolic panel  (Ca, Cl, CO2, Creat, Gluc, K, Na, BUN)      Elevated glucose          Class 1 obesity due to excess calories with serious comorbidity and body mass index (BMI) of 32.0 to 32.9 in adult        Relevant Medications    metFORMIN (GLUCOPHAGE XR) 500 MG 24 hr tablet           Of note, just prior to blood work today he did consume 12 oz of regular Coke    Continue same dose hydrochlorothiazide 1 year, 25mg/day    Add Metformin 500mg XR daily for weight loss, follow up 1 month       BMI  Estimated body mass index is 32.86 kg/m  as calculated from the following:    Height as of this encounter: 1.778 m (5' 10\").    Weight as of this encounter: 103.9 kg (229 lb).         Imani Sanz is a 54 year old, presenting for the following health issues:  Hypertension        6/12/2025     7:34 AM   Additional Questions   Roomed by Jessica PHILLIPS CMA     History of Present Illness       Hypertension: He presents for follow up of hypertension.  He does not check blood pressure  regularly outside of the clinic. Outpatient blood pressures have not been over 140/90. He does not follow a low salt diet.     He eats 2-3 servings of fruits and vegetables daily.He consumes 3 sweetened beverage(s) daily.He exercises with enough effort to increase his heart rate 20 to 29 minutes per day.  He exercises with enough effort to increase his heart rate 4 days per week.   He is taking medications regularly.      HE would like to trial medical assisted weight loss      Objective    /80 (BP Location: Right arm, Patient Position: Chair, Cuff Size: Adult Large)   Pulse 88   Temp 98  F (36.7  C) (Oral)   Resp 16   Ht 1.778 m (5' 10\")   Wt 103.9 kg (229 lb)   SpO2 98%   BMI 32.86 " kg/m    Body mass index is 32.86 kg/m .  Physical Exam   Gen NAD  Normal mood/affect    Office Visit on 05/02/2025   Component Date Value Ref Range Status    Cholesterol 05/02/2025 181  <200 mg/dL Final    Triglycerides 05/02/2025 103  <150 mg/dL Final    Direct Measure HDL 05/02/2025 39 (L)  >=40 mg/dL Final    LDL Cholesterol Calculated 05/02/2025 121 (H)  <100 mg/dL Final    Non HDL Cholesterol 05/02/2025 142 (H)  <130 mg/dL Final    Patient Fasting > 8hrs? 05/02/2025 Yes   Final    Sodium 05/02/2025 143  135 - 145 mmol/L Final    Potassium 05/02/2025 5.2  3.4 - 5.3 mmol/L Final    Chloride 05/02/2025 107  98 - 107 mmol/L Final    Carbon Dioxide (CO2) 05/02/2025 25  22 - 29 mmol/L Final    Anion Gap 05/02/2025 11  7 - 15 mmol/L Final    Urea Nitrogen 05/02/2025 10.9  6.0 - 20.0 mg/dL Final    Creatinine 05/02/2025 1.02  0.67 - 1.17 mg/dL Final    GFR Estimate 05/02/2025 87  >60 mL/min/1.73m2 Final    eGFR calculated using 2021 CKD-EPI equation.    Calcium 05/02/2025 9.5  8.8 - 10.4 mg/dL Final    Glucose 05/02/2025 125 (H)  70 - 99 mg/dL Final    Patient Fasting > 8hrs? 05/02/2025 Yes   Final           Signed Electronically by: LITZY DANIELS DO

## 2025-06-16 ENCOUNTER — RESULTS FOLLOW-UP (OUTPATIENT)
Dept: FAMILY MEDICINE | Facility: CLINIC | Age: 55
End: 2025-06-16

## 2025-07-14 ENCOUNTER — OFFICE VISIT (OUTPATIENT)
Dept: FAMILY MEDICINE | Facility: CLINIC | Age: 55
End: 2025-07-14
Payer: COMMERCIAL

## 2025-07-14 VITALS
TEMPERATURE: 98 F | OXYGEN SATURATION: 96 % | HEIGHT: 70 IN | BODY MASS INDEX: 32.93 KG/M2 | HEART RATE: 84 BPM | SYSTOLIC BLOOD PRESSURE: 117 MMHG | WEIGHT: 230 LBS | DIASTOLIC BLOOD PRESSURE: 81 MMHG | RESPIRATION RATE: 16 BRPM

## 2025-07-14 DIAGNOSIS — E66.811 CLASS 1 OBESITY DUE TO EXCESS CALORIES WITH SERIOUS COMORBIDITY AND BODY MASS INDEX (BMI) OF 32.0 TO 32.9 IN ADULT: ICD-10-CM

## 2025-07-14 DIAGNOSIS — E66.09 CLASS 1 OBESITY DUE TO EXCESS CALORIES WITH SERIOUS COMORBIDITY AND BODY MASS INDEX (BMI) OF 32.0 TO 32.9 IN ADULT: ICD-10-CM

## 2025-07-14 DIAGNOSIS — R73.09 ELEVATED GLUCOSE: Primary | ICD-10-CM

## 2025-07-14 LAB
EST. AVERAGE GLUCOSE BLD GHB EST-MCNC: 128 MG/DL
HBA1C MFR BLD: 6.1 % (ref 0–5.6)

## 2025-07-14 PROCEDURE — 1126F AMNT PAIN NOTED NONE PRSNT: CPT | Performed by: FAMILY MEDICINE

## 2025-07-14 PROCEDURE — 83036 HEMOGLOBIN GLYCOSYLATED A1C: CPT | Performed by: FAMILY MEDICINE

## 2025-07-14 PROCEDURE — 36415 COLL VENOUS BLD VENIPUNCTURE: CPT | Performed by: FAMILY MEDICINE

## 2025-07-14 PROCEDURE — 3079F DIAST BP 80-89 MM HG: CPT | Performed by: FAMILY MEDICINE

## 2025-07-14 PROCEDURE — 99214 OFFICE O/P EST MOD 30 MIN: CPT | Performed by: FAMILY MEDICINE

## 2025-07-14 PROCEDURE — 3074F SYST BP LT 130 MM HG: CPT | Performed by: FAMILY MEDICINE

## 2025-07-14 RX ORDER — METFORMIN HYDROCHLORIDE 500 MG/1
1000 TABLET, EXTENDED RELEASE ORAL
Qty: 180 TABLET | Refills: 3 | Status: SHIPPED | OUTPATIENT
Start: 2025-07-14

## 2025-07-14 ASSESSMENT — PAIN SCALES - GENERAL: PAINLEVEL_OUTOF10: NO PAIN (0)

## 2025-07-14 NOTE — PROGRESS NOTES
"  Assessment & Plan   Problem List Items Addressed This Visit    None  Visit Diagnoses         Elevated glucose    -  Primary    Relevant Orders    Hemoglobin A1c      Class 1 obesity due to excess calories with serious comorbidity and body mass index (BMI) of 32.0 to 32.9 in adult        Relevant Medications    metFORMIN (GLUCOPHAGE XR) 500 MG 24 hr tablet           Increase metformin to 1000mg/day to help with weight and elevated sugars  Follow up PRN  Stable HTN    BMI  Estimated body mass index is 33 kg/m  as calculated from the following:    Height as of this encounter: 1.778 m (5' 10\").    Weight as of this encounter: 104.3 kg (230 lb).         Imani Sanz is a 54 year old, presenting for the following health issues:  Recheck Medication        7/14/2025    10:29 AM   Additional Questions   Roomed by lucrecia guzman ma     HPI        Medication Followup of hydrochlorothiazide 25 mg  Taking Medication as prescribed: yes  Side Effects:  None  Medication Helping Symptoms:  yes  Medication Followup of metformin 500mg 24hr tab  Taking Medication as prescribed: yes  Side Effects:  None  Medication Helping Symptoms:  yes    Missed 2 doses last week    No GI side effects    Up one pound from last time      Review of Systems  Constitutional, HEENT, cardiovascular, pulmonary, gi and gu systems are negative, except as otherwise noted.      Objective    /81 (BP Location: Right arm, Patient Position: Sitting, Cuff Size: Adult Large)   Pulse 84   Temp 98  F (36.7  C) (Oral)   Resp 16   Ht 1.778 m (5' 10\")   Wt 104.3 kg (230 lb)   SpO2 96%   BMI 33.00 kg/m    Body mass index is 33 kg/m .  Physical Exam   GENERAL: alert and no distress    Office Visit on 06/12/2025   Component Date Value Ref Range Status    Sodium 06/12/2025 139  135 - 145 mmol/L Final    Potassium 06/12/2025 3.7  3.4 - 5.3 mmol/L Final    Chloride 06/12/2025 102  98 - 107 mmol/L Final    Carbon Dioxide (CO2) 06/12/2025 24  22 - 29 mmol/L " Final    Anion Gap 06/12/2025 13  7 - 15 mmol/L Final    Urea Nitrogen 06/12/2025 12.8  6.0 - 20.0 mg/dL Final    Creatinine 06/12/2025 1.13  0.67 - 1.17 mg/dL Final    GFR Estimate 06/12/2025 77  >60 mL/min/1.73m2 Final    eGFR calculated using 2021 CKD-EPI equation.    Calcium 06/12/2025 9.5  8.8 - 10.4 mg/dL Final    Glucose 06/12/2025 172 (H)  70 - 99 mg/dL Final           Signed Electronically by: LITZY DANIELS DO

## (undated) DEVICE — DRAPE STERI TOWEL SM 1000

## (undated) DEVICE — GLOVE PROTEXIS W/NEU-THERA 7.5  2D73TE75

## (undated) DEVICE — GLOVE PROTEXIS POWDER FREE 7.5 ORTHOPEDIC 2D73ET75

## (undated) DEVICE — STRAP STIRRUP W/SLIP 30187-030

## (undated) DEVICE — DRAPE IOBAN INCISE 23X17" 6650EZ

## (undated) DEVICE — PACK EXTREMITY SOP15EXFSD

## (undated) DEVICE — NDL 22GA 1.5"

## (undated) DEVICE — ESU PENCIL SMOKE EVAC W/ROCKER SWITCH 0703-047-000

## (undated) DEVICE — SOL WATER IRRIG 1000ML BOTTLE 07139-09

## (undated) DEVICE — IMM KIT SHOULDER STABILIZATION 7210573

## (undated) DEVICE — Device

## (undated) DEVICE — SU VICRYL 0 CT-1 27" UND J260H

## (undated) DEVICE — DRAPE STOCKINETTE IMPERVIOUS 12" 1587

## (undated) DEVICE — SUCTION TIP YANKAUER W/O VENT K86

## (undated) DEVICE — PREP CHLORAPREP 26ML TINTED ORANGE  260815

## (undated) DEVICE — GLOVE PROTEXIS BLUE W/NEU-THERA 7.5  2D73EB75

## (undated) DEVICE — NDL 19GA 1.5"

## (undated) DEVICE — DRSG ABDOMINAL 07 1/2X8" 7197D

## (undated) DEVICE — BLADE SAW LONG WIDEÂ 13MMX34.5MM 2296-003-106

## (undated) DEVICE — SU NDL MAYO 1/2 CIRCLE TROCAR 1826-4D

## (undated) DEVICE — SU VICRYL 1 CT-1 27" J341H

## (undated) DEVICE — DRSG STERI STRIP 1/2X4" R1547

## (undated) DEVICE — DRSG GAUZE 4X4" 3033

## (undated) DEVICE — DECANTER BAG 2002S

## (undated) DEVICE — SU NDL MAYO TROCAR SZ 5 1826-5D

## (undated) DEVICE — DRSG GAUZE 4X4" TRAY 6939

## (undated) DEVICE — SU VICRYL 2-0 SH 27" UND J417H

## (undated) DEVICE — SU PROLENE 3-0 PS-2 18" 8687H

## (undated) RX ORDER — DEXAMETHASONE SODIUM PHOSPHATE 4 MG/ML
INJECTION, SOLUTION INTRA-ARTICULAR; INTRALESIONAL; INTRAMUSCULAR; INTRAVENOUS; SOFT TISSUE
Status: DISPENSED
Start: 2021-09-03

## (undated) RX ORDER — OXYCODONE HYDROCHLORIDE 5 MG/1
TABLET ORAL
Status: DISPENSED
Start: 2021-09-03

## (undated) RX ORDER — LIDOCAINE HYDROCHLORIDE 20 MG/ML
INJECTION, SOLUTION EPIDURAL; INFILTRATION; INTRACAUDAL; PERINEURAL
Status: DISPENSED
Start: 2021-07-19

## (undated) RX ORDER — PROPOFOL 10 MG/ML
INJECTION, EMULSION INTRAVENOUS
Status: DISPENSED
Start: 2021-04-07

## (undated) RX ORDER — ONDANSETRON 2 MG/ML
INJECTION INTRAMUSCULAR; INTRAVENOUS
Status: DISPENSED
Start: 2021-09-03

## (undated) RX ORDER — ONDANSETRON 2 MG/ML
INJECTION INTRAMUSCULAR; INTRAVENOUS
Status: DISPENSED
Start: 2021-04-07

## (undated) RX ORDER — FENTANYL CITRATE 50 UG/ML
INJECTION, SOLUTION INTRAMUSCULAR; INTRAVENOUS
Status: DISPENSED
Start: 2021-09-03

## (undated) RX ORDER — EPHEDRINE SULFATE 50 MG/ML
INJECTION, SOLUTION INTRAMUSCULAR; INTRAVENOUS; SUBCUTANEOUS
Status: DISPENSED
Start: 2021-04-07

## (undated) RX ORDER — FENTANYL CITRATE 50 UG/ML
INJECTION, SOLUTION INTRAMUSCULAR; INTRAVENOUS
Status: DISPENSED
Start: 2021-07-19

## (undated) RX ORDER — PROPOFOL 10 MG/ML
INJECTION, EMULSION INTRAVENOUS
Status: DISPENSED
Start: 2021-09-03

## (undated) RX ORDER — LIDOCAINE HYDROCHLORIDE 20 MG/ML
INJECTION, SOLUTION EPIDURAL; INFILTRATION; INTRACAUDAL; PERINEURAL
Status: DISPENSED
Start: 2021-04-07

## (undated) RX ORDER — ACETAMINOPHEN 325 MG/1
TABLET ORAL
Status: DISPENSED
Start: 2021-09-03

## (undated) RX ORDER — PROPOFOL 10 MG/ML
INJECTION, EMULSION INTRAVENOUS
Status: DISPENSED
Start: 2021-07-19

## (undated) RX ORDER — LIDOCAINE HYDROCHLORIDE 20 MG/ML
INJECTION, SOLUTION INFILTRATION; PERINEURAL
Status: DISPENSED
Start: 2021-09-03

## (undated) RX ORDER — ONDANSETRON 2 MG/ML
INJECTION INTRAMUSCULAR; INTRAVENOUS
Status: DISPENSED
Start: 2021-07-19

## (undated) RX ORDER — BUPIVACAINE HYDROCHLORIDE AND EPINEPHRINE 5; 5 MG/ML; UG/ML
INJECTION, SOLUTION EPIDURAL; INTRACAUDAL; PERINEURAL
Status: DISPENSED
Start: 2021-09-03

## (undated) RX ORDER — FENTANYL CITRATE-0.9 % NACL/PF 10 MCG/ML
PLASTIC BAG, INJECTION (ML) INTRAVENOUS
Status: DISPENSED
Start: 2021-04-07

## (undated) RX ORDER — EPINEPHRINE 1 MG/ML
INJECTION, SOLUTION INTRAMUSCULAR; SUBCUTANEOUS
Status: DISPENSED
Start: 2021-09-03